# Patient Record
Sex: FEMALE | Race: BLACK OR AFRICAN AMERICAN | NOT HISPANIC OR LATINO | Employment: UNEMPLOYED | URBAN - METROPOLITAN AREA
[De-identification: names, ages, dates, MRNs, and addresses within clinical notes are randomized per-mention and may not be internally consistent; named-entity substitution may affect disease eponyms.]

---

## 2018-04-04 ENCOUNTER — OFFICE VISIT (OUTPATIENT)
Dept: INTERNAL MEDICINE | Facility: CLINIC | Age: 28
End: 2018-04-04
Payer: COMMERCIAL

## 2018-04-04 VITALS
WEIGHT: 159.7 LBS | SYSTOLIC BLOOD PRESSURE: 120 MMHG | BODY MASS INDEX: 26.61 KG/M2 | RESPIRATION RATE: 18 BRPM | TEMPERATURE: 98 F | HEIGHT: 65 IN | DIASTOLIC BLOOD PRESSURE: 74 MMHG | OXYGEN SATURATION: 98 % | HEART RATE: 94 BPM

## 2018-04-04 DIAGNOSIS — N92.6 IRREGULAR MENSTRUAL CYCLE: Primary | ICD-10-CM

## 2018-04-04 LAB
PROLACTIN SERPL-MCNC: 7 UG/L (ref 3–27)
SPECIMEN SOURCE: NORMAL
WET PREP SPEC: NORMAL

## 2018-04-04 PROCEDURE — G0145 SCR C/V CYTO,THINLAYER,RESCR: HCPCS | Performed by: INTERNAL MEDICINE

## 2018-04-04 PROCEDURE — 87210 SMEAR WET MOUNT SALINE/INK: CPT | Performed by: INTERNAL MEDICINE

## 2018-04-04 PROCEDURE — 36415 COLL VENOUS BLD VENIPUNCTURE: CPT | Performed by: INTERNAL MEDICINE

## 2018-04-04 PROCEDURE — 99213 OFFICE O/P EST LOW 20 MIN: CPT | Performed by: INTERNAL MEDICINE

## 2018-04-04 PROCEDURE — 84443 ASSAY THYROID STIM HORMONE: CPT | Performed by: INTERNAL MEDICINE

## 2018-04-04 PROCEDURE — 84146 ASSAY OF PROLACTIN: CPT | Performed by: INTERNAL MEDICINE

## 2018-04-04 NOTE — MR AVS SNAPSHOT
"              After Visit Summary   2018    Elidia Barnes    MRN: [id number]           Patient Information     Date Of Birth          1990        Visit Information        Provider Department      2018 9:00 AM Maye Yepez MD; [employer] [facility]        Today's Diagnoses     Irregular menstrual cycle    -  1       Follow-ups after your visit        Who to contact     If you have questions or need follow up information about today's clinic visit or your schedule please contact [facility] directly at [phone].  Normal or non-critical lab and imaging results will be communicated to you by CloudEnginehart, letter or phone within 4 business days after the clinic has received the results. If you do not hear from us within 7 days, please contact the clinic through CloudEnginehart or phone. If you have a critical or abnormal lab result, we will notify you by phone as soon as possible.  Submit refill requests through Infectious or call your pharmacy and they will forward the refill request to us. Please allow 3 business days for your refill to be completed.          Additional Information About Your Visit        MyChart Information     Infectious lets you send messages to your doctor, view your test results, renew your prescriptions, schedule appointments and more. To sign up, go to [email or web address] . Click on \"Log in\" on the left side of the screen, which will take you to the Welcome page. Then click on \"Sign up Now\" on the right side of the page.     You will be asked to enter the access code listed below, as well as some personal information. Please follow the directions to create your username and password.     Your access code is: [email or web address]  Expires: 7/3/2018  9:48 AM     Your access code will  in 90 days. If you need help or a new code, please call your [facility] or [phone].        Care EveryWhere ID     This is your Care EveryWhere ID. " "This could be used by other organizations to access your [facility] medical records  [id number]        Your Vitals Were     Pulse Temperature Respirations Height Last Period Pulse Oximetry    94 98  F (36.7  C) (Oral) 18 5' 4.5\" (1.638 m) 02/15/2018 (Exact Date) 98%    BMI (Body Mass Index)                   26.99 kg/m2            Blood Pressure from Last 3 Encounters:   04/04/18 120/74    Weight from Last 3 Encounters:   04/04/18 159 lb 11.2 oz (72.4 kg)              We Performed the Following     Prolactin     TSH with free T4 reflex        Primary Care Provider Fax #    Physician No Ref-Primary [phone]       No address on file        Equal Access to Services     [place] : Amy Maston, christina kaur, raul cedeño, morris sheets . So [facility] [phone].    ATENCIÓN: Si habla español, tiene a aguero disposición servicios gratuitos de asistencia lingüística. Llame al [phone].    We comply with applicable federal civil rights laws and [place] laws. We do not discriminate on the basis of race, color, national origin, age, disability, sex, sexual orientation, or gender identity.            Thank you!     Thank you for choosing [facility]  for your care. Our goal is always to provide you with excellent care. Hearing back from our patients is one way we can continue to improve our services. Please take a few minutes to complete the written survey that you may receive in the mail after your visit with us. Thank you!             Your Updated Medication List - Protect others around you: Learn how to safely use, store and throw away your medicines at www.disposemymeds.org.      Notice  As of 4/4/2018  9:48 AM    You have not been prescribed any medications.      "

## 2018-04-04 NOTE — PROGRESS NOTES
"  SUBJECTIVE:   Eldiia Barnes is a 28 year old female who presents to clinic today for the following health issues:      She has irregular period. Her last period started on 2/18/18 and ended on 3/30/18. Sometime she has no menstrual period up to four months and when she gets it then it lasted for over a month.    She has history of miscarriage in 2011, since then her periods of irregular. No other pregnancies since then.     She does report history of galactorrhea during periods.     No weight loss.  No palpitations.  No hair loss.     She also has vaginal itching, no vag discharge.      Problem list and histories reviewed & adjusted, as indicated.  Additional history: as documented    There is no problem list on file for this patient.    History reviewed. No pertinent surgical history.    Social History   Substance Use Topics     Smoking status: Never Smoker     Smokeless tobacco: Never Used     Alcohol use No     History reviewed. No pertinent family history.        Reviewed and updated as needed this visit by clinical staff  Tobacco  Allergies  Meds  Med Hx  Surg Hx  Fam Hx  Soc Hx      Reviewed and updated as needed this visit by Provider         ROS:  Constitutional, HEENT, cardiovascular, pulmonary, gi and gu systems are negative, except as otherwise noted.    OBJECTIVE:     /74 (BP Location: Right arm, Patient Position: Sitting, Cuff Size: Adult Regular)  Pulse 94  Temp 98  F (36.7  C) (Oral)  Resp 18  Ht 5' 4.5\" (1.638 m)  Wt 159 lb 11.2 oz (72.4 kg)  LMP 02/15/2018 (Exact Date)  SpO2 98%  BMI 26.99 kg/m2  Body mass index is 26.99 kg/(m^2).  GENERAL: healthy, alert and no distress  NECK: no adenopathy, no asymmetry, masses, or scars and thyroid normal to palpation  RESP: lungs clear to auscultation - no rales, rhonchi or wheezes  CV: regular rate and rhythm, normal S1 S2, no S3 or S4, no murmur, click or rub, no peripheral edema and peripheral pulses strong  ABDOMEN: soft, " nontender, no hepatosplenomegaly, no masses and bowel sounds normal  MS: no gross musculoskeletal defects noted, no edema  Pelvic Exam:  Vulva: No external lesions, normal hair distribution, no adenopathy  Vagina: Moist, pink, no abnormal discharge, well rugated, no lesions  Cervix: Pap smear is taken, parous, smooth, pink, no visible lesions  Uterus: Normal size, anteverted, non-tender, mobile  Ovaries: No mass, non-tender, mobile        Diagnostic Test Results:  none     ASSESSMENT/PLAN:       (N92.6) Irregular menstrual cycle  (primary encounter diagnosis)  Comment:   She reports history of galactorrhea, will check prolactin levels, along with thyroid.  This seems to be more in line with anovulatory bleeding. If normal, will refer to obgyn for further staley    Plan: TSH with free T4 reflex, Prolactin, Pap imaged         thin layer screen reflex to HPV if ASCUS -         recommend age 25 - 29, Wet prep                Maye Yepez MD  [facility]'

## 2018-04-04 NOTE — NURSING NOTE
"Chief Complaint   Patient presents with     Consult     She has irregular period.       Initial /74 (BP Location: Right arm, Patient Position: Sitting, Cuff Size: Adult Regular)  Pulse 94  Temp 98  F (36.7  C) (Oral)  Resp 18  Ht 5' 4.5\" (1.638 m)  Wt 159 lb 11.2 oz (72.4 kg)  LMP 02/15/2018 (Exact Date)  SpO2 98%  BMI 26.99 kg/m2 Estimated body mass index is 26.99 kg/(m^2) as calculated from the following:    Height as of this encounter: 5' 4.5\" (1.638 m).    Weight as of this encounter: 159 lb 11.2 oz (72.4 kg).  Medication Reconciliation: complete   Sarita Mcclendon MA      "

## 2018-04-04 NOTE — LETTER
April 11, 2018      Elidia Barnes  [address]  [place] [address]    Dear ,      I am happy to inform you that your recent cervical cancer screening test (PAP smear) was normal.      Preventative screenings such as this help to ensure your health for years to come. You should repeat a pap smear in 3 years, unless otherwise directed.      You will still need to return to the clinic every year for your annual exam and other preventive tests.     Please contact the clinic at [phone] if you have further questions.       Sincerely,      Maye Yepez MD/[facility]

## 2018-04-05 LAB — TSH SERPL DL<=0.005 MIU/L-ACNC: 2.3 MU/L (ref 0.4–4)

## 2018-04-06 LAB
COPATH REPORT: NORMAL
PAP: NORMAL

## 2018-04-12 ENCOUNTER — OFFICE VISIT (OUTPATIENT)
Dept: OBGYN | Facility: CLINIC | Age: 28
End: 2018-04-12
Payer: COMMERCIAL

## 2018-04-12 VITALS
HEIGHT: 64 IN | SYSTOLIC BLOOD PRESSURE: 118 MMHG | BODY MASS INDEX: 27.54 KG/M2 | DIASTOLIC BLOOD PRESSURE: 78 MMHG | WEIGHT: 161.3 LBS | TEMPERATURE: 98 F

## 2018-04-12 DIAGNOSIS — N92.6 IRREGULAR MENSTRUAL CYCLE: Primary | ICD-10-CM

## 2018-04-12 DIAGNOSIS — Z11.3 ROUTINE SCREENING FOR STI (SEXUALLY TRANSMITTED INFECTION): ICD-10-CM

## 2018-04-12 PROBLEM — N91.5 OLIGOMENORRHEA, UNSPECIFIED: Status: ACTIVE | Noted: 2018-04-12

## 2018-04-12 LAB
ERYTHROCYTE [DISTWIDTH] IN BLOOD BY AUTOMATED COUNT: 13.7 % (ref 10–15)
FSH SERPL-ACNC: 5.5 IU/L
HCG, QUAL URINE: NORMAL
HCT VFR BLD AUTO: 33.1 % (ref 35–47)
HGB BLD-MCNC: 10.7 G/DL (ref 11.7–15.7)
LH SERPL-ACNC: 11.6 IU/L
MCH RBC QN AUTO: 28 PG (ref 26.5–33)
MCHC RBC AUTO-ENTMCNC: 32.3 G/DL (ref 31.5–36.5)
MCV RBC AUTO: 87 FL (ref 78–100)
PLATELET # BLD AUTO: 314 10E9/L (ref 150–450)
RBC # BLD AUTO: 3.82 10E12/L (ref 3.8–5.2)
WBC # BLD AUTO: 6 10E9/L (ref 4–11)

## 2018-04-12 PROCEDURE — 87591 N.GONORRHOEAE DNA AMP PROB: CPT | Performed by: ADVANCED PRACTICE MIDWIFE

## 2018-04-12 PROCEDURE — 85027 COMPLETE CBC AUTOMATED: CPT | Performed by: ADVANCED PRACTICE MIDWIFE

## 2018-04-12 PROCEDURE — 87491 CHLMYD TRACH DNA AMP PROBE: CPT | Performed by: ADVANCED PRACTICE MIDWIFE

## 2018-04-12 PROCEDURE — 99203 OFFICE O/P NEW LOW 30 MIN: CPT | Performed by: ADVANCED PRACTICE MIDWIFE

## 2018-04-12 PROCEDURE — 36415 COLL VENOUS BLD VENIPUNCTURE: CPT | Performed by: ADVANCED PRACTICE MIDWIFE

## 2018-04-12 PROCEDURE — 83001 ASSAY OF GONADOTROPIN (FSH): CPT | Performed by: ADVANCED PRACTICE MIDWIFE

## 2018-04-12 PROCEDURE — 83002 ASSAY OF GONADOTROPIN (LH): CPT | Performed by: ADVANCED PRACTICE MIDWIFE

## 2018-04-12 NOTE — PROGRESS NOTES
SUBJECTIVE:                                                   Elidia Barnes is a 28 year old who presents to clinic today for the following health issue(s):  Patient presents with:  Abnormal Uterine Bleeding: Irreg menses      HPI: Here to discuss irregular periods.  Has been having irregular periods since , states it occurred after miscarriage. Reports only other symptoms include galactorrhea with menses, frequent headaches and weight gain. Patient and  have been attempting to conceive for 7 years.     Patient's last menstrual period was 2018.  Menstrual History: irregular cycles 4-8 months. Reports heavy to light bleeding, varies on amount of days.   Patient is sexually active  .  Using none for contraception. Trying to conceive   Health maintenance updated:  yes  STI infx testing offered:  Declined recently collected     Problem list and histories reviewed & adjusted, as indicated.  Additional history: as documented.    There is no problem list on file for this patient.    Past Surgical History:   Procedure Laterality Date     NO HISTORY OF SURGERY        Social History   Substance Use Topics     Smoking status: Never Smoker     Smokeless tobacco: Never Used     Alcohol use No      Problem (# of Occurrences) Relation (Name,Age of Onset)    Family History Negative (1) Mother            No current outpatient prescriptions on file.     No current facility-administered medications for this visit.      No Known Allergies    ROS:  C: NEGATIVE for fever, chills, change in weight  E: NEGATIVE for vision changes or irritation  R: NEGATIVE for significant cough or SOB  B: NEGATIVE for masses, tenderness or discharge  CV: NEGATIVE for chest pain, palpitations or peripheral edema  GI: NEGATIVE for nausea, abdominal pain, heartburn, or change in bowel habits  : NEGATIVE for unusual urinary or vaginal symptoms. Periods are regular.  NEURO: NEGATIVE for weakness, dizziness or paresthesias and  "POSITIVE for frequent headaches   P: NEGATIVE for changes in mood or affect    OBJECTIVE:     /78 (BP Location: Left arm, Patient Position: Chair, Cuff Size: Adult Regular)  Temp 98  F (36.7  C) (Oral)  Ht 5' 4\" (1.626 m)  Wt 161 lb 4.8 oz (73.2 kg)  LMP 02/25/2018  BMI 27.69 kg/m2  Body mass index is 27.69 kg/(m^2).    PHYSICAL EXAM:  Constitutional:  Appearance: Well nourished, well developed alert, in no acute distress  Chest:  Respiratory Effort:  Breathing unlabored  Cardiovascular: Heart: Auscultation:  Regular rate, normal rhythm, no murmurs present  Gastrointestinal:  Abdominal Examination:  Abdomen nontender to palpation, tone normal without rigidity or guarding, no masses present, umbilicus without lesions; Liver/Spleen:  No hepatomegaly present, liver nontender to palpation; Hernias:  No hernias present  Neurologic/Psychiatric:  Mental Status:  Oriented X3   Pelvic Exam:  External Genitalia:     Normal appearance for age, no discharge present, no tenderness present, no inflammatory lesions present, color normal  Vagina:     Normal vaginal vault without central or paravaginal defects, no discharge present, no inflammatory lesions present, no masses present  Bladder:     Nontender to palpation  Urethra:   Urethral Body:  Urethra palpation normal, urethra structural support normal   Urethral Meatus:  No erythema or lesions present  Cervix:     Appearance healthy, no lesions present, nontender to palpation, no bleeding present  Uterus:     Uterus: firm, normal sized and nontender, midplane in position.   Adnexa:     No adnexal tenderness present, no adnexal masses present  Perineum:     Perineum within normal limits, no evidence of trauma, no rashes or skin lesions present  Anus:     Anus within normal limits, no hemorrhoids present  Inguinal Lymph Nodes:     No lymphadenopathy present  Pubic Hair:     Normal pubic hair distribution for age  Genitalia and Groin:     No rashes present, no lesions " present, no areas of discoloration, no masses present       In-Clinic Test Results:  No results found for this or any previous visit (from the past 24 hour(s)).    ASSESSMENT/PLAN:                                                      (N92.6) Irregular menstrual cycle  (primary encounter diagnosis)  Comment: ordered  Plan: HCL HCG, URINE, NURSE BACKOFFICE, Follicle         stimulating hormone, Lutropin, CBC with         platelets, US Pelvic Complete w Transvaginal         Results pending     (Z11.3) Routine screening for STI (sexually transmitted infection)  Comment: collected    Plan: Chlamydia trachomatis PCR, Neisseria         gonorrhoeae PCR        Results pending     -Reviewed labs from last visit all NIL, patient not currently having symptoms of galactorrhea.  Will draw FSH and LH however uncertain where she is at in her cycle. Discussed that anovulatory cycles vs oligomenorrhea may have several etiologies   -Will have Patient follow up with OBGYN to discuss further testing for fertility     DARIAN Hendrickson, VANESSA     30 minutes was spent face to face with the patient today discussing her history, diagnosis, and follow-up plan as noted above. Over 50% of the visit was spent in counseling and coordination of care.

## 2018-04-12 NOTE — MR AVS SNAPSHOT
After Visit Summary   4/12/2018    Elidia Barnes    MRN: [id number]           Patient Information     Date Of Birth          1990        Visit Information        Provider Department      4/12/2018 12:45 PM Faith Doan APRN CNM; [employer] [facility]        Today's Diagnoses     Irregular menstrual cycle    -  1    Routine screening for STI (sexually transmitted infection)           Follow-ups after your visit        Follow-up notes from your care team     Return in about 4 weeks (around 5/10/2018), or if symptoms worsen or fail to improve.      Your next 10 appointments already scheduled     Apr 18, 2018  1:45 PM CDT   US PELVIC COMPLETE W TRANSVAGINAL with OXUS1   [facility]    [address] [place] [id number] [phone]           Please bring a list of your medicines (including vitamins, minerals and over-the-counter drugs). Also, tell your doctor about any allergies you may have. Wear comfortable clothes and leave your valuables at home.  Adults: Drink six 8-ounce glasses of fluid one hour before your exam. Do NOT empty your bladder.  If you need to empty your bladder before your exam, try to release only a little bit of urine. Then, drink another 8oz glass of fluid.  Children: Children who are potty trained should drink at least 4 cups (32 oz) of liquid 45 minutes to one hour prior to the exam. The child s bladder must be full in order to achieve a diagnostic exam. If your child is very uncomfortable or has an urgent need to pee, please notify a technologist; they will try to find out how much longer the wait may be and provide instructions to help relieve the pressure. Occasionally it is medically necessary to insert a urinary catheter to fill the bladder.  Please call the Imaging Department at your exam site with any questions.            Apr 25, 2018 12:45 PM  "CDT   SHORT with Mercedez Gustafson, DO   [facility]    [address]  [facility] [address] [phone]              Future tests that were ordered for you today     Open Future Orders        Priority Expected Expires Ordered    US Pelvic Complete w Transvaginal Routine  2019            Who to contact     If you have questions or need follow up information about today's clinic visit or your schedule please contact [facility] directly at [phone].  Normal or non-critical lab and imaging results will be communicated to you by Curb Callhart, letter or phone within 4 business days after the clinic has received the results. If you do not hear from us within 7 days, please contact the clinic through Curb Callhart or phone. If you have a critical or abnormal lab result, we will notify you by phone as soon as possible.  Submit refill requests through [employer] or call your pharmacy and they will forward the refill request to us. Please allow 3 business days for your refill to be completed.          Additional Information About Your Visit        Curb CallharAcrinta Information     [employer] lets you send messages to your doctor, view your test results, renew your prescriptions, schedule appointments and more. To sign up, go to [email or web address] . Click on \"Log in\" on the left side of the screen, which will take you to the Welcome page. Then click on \"Sign up Now\" on the right side of the page.     You will be asked to enter the access code listed below, as well as some personal information. Please follow the directions to create your username and password.     Your access code is: [email or web address]  Expires: 7/3/2018  9:48 AM     Your access code will  in 90 days. If you need help or a new code, please call your [facility] or [phone].        Care EveryWhere ID     This is your Care EveryWhere ID. This could be used by other organizations " "to access your [facility] medical records  [id number]        Your Vitals Were     Temperature Height Last Period BMI (Body Mass Index)          98  F (36.7  C) (Oral) 5' 4\" (1.626 m) 02/25/2018 27.69 kg/m2         Blood Pressure from Last 3 Encounters:   04/12/18 118/78   04/04/18 120/74    Weight from Last 3 Encounters:   04/12/18 161 lb 4.8 oz (73.2 kg)   04/04/18 159 lb 11.2 oz (72.4 kg)              We Performed the Following     CBC with platelets     Chlamydia trachomatis PCR     Follicle stimulating hormone     HCL HCG, URINE, NURSE BACKOFFICE     Lutropin     Neisseria gonorrhoeae PCR        Primary Care Provider Fax #    Physician No Ref-Primary [phone]       No address on file        Equal Access to Services     [place] : Amy hernandez Sotho, waaxda luqadaha, qaybta kaalmada adeegyada, morris sheets . So [facility] [phone].    ATENCIÓN: Si habla español, tiene a aguero disposición servicios gratuitos de asistencia lingüística. Llame al [phone].    We comply with applicable federal civil rights laws and [place] laws. We do not discriminate on the basis of race, color, national origin, age, disability, sex, sexual orientation, or gender identity.            Thank you!     Thank you for choosing [facility]  for your care. Our goal is always to provide you with excellent care. Hearing back from our patients is one way we can continue to improve our services. Please take a few minutes to complete the written survey that you may receive in the mail after your visit with us. Thank you!             Your Updated Medication List - Protect others around you: Learn how to safely use, store and throw away your medicines at www.disposemymeds.org.      Notice  As of 4/12/2018  2:57 PM    You have not been prescribed any medications.      "

## 2018-04-13 LAB
C TRACH DNA SPEC QL NAA+PROBE: NEGATIVE
N GONORRHOEA DNA SPEC QL NAA+PROBE: NEGATIVE
SPECIMEN SOURCE: NORMAL
SPECIMEN SOURCE: NORMAL

## 2018-04-18 ENCOUNTER — RADIANT APPOINTMENT (OUTPATIENT)
Dept: ULTRASOUND IMAGING | Facility: CLINIC | Age: 28
End: 2018-04-18
Attending: ADVANCED PRACTICE MIDWIFE
Payer: COMMERCIAL

## 2018-04-18 DIAGNOSIS — N92.6 IRREGULAR MENSTRUAL CYCLE: ICD-10-CM

## 2018-04-18 PROCEDURE — 76856 US EXAM PELVIC COMPLETE: CPT | Performed by: OBSTETRICS & GYNECOLOGY

## 2018-04-18 PROCEDURE — 76830 TRANSVAGINAL US NON-OB: CPT | Performed by: OBSTETRICS & GYNECOLOGY

## 2018-04-26 ENCOUNTER — OFFICE VISIT (OUTPATIENT)
Dept: OBGYN | Facility: CLINIC | Age: 28
End: 2018-04-26
Payer: COMMERCIAL

## 2018-04-26 ENCOUNTER — TELEPHONE (OUTPATIENT)
Dept: OBGYN | Facility: CLINIC | Age: 28
End: 2018-04-26

## 2018-04-26 VITALS
HEART RATE: 76 BPM | DIASTOLIC BLOOD PRESSURE: 64 MMHG | BODY MASS INDEX: 27.15 KG/M2 | WEIGHT: 158.2 LBS | SYSTOLIC BLOOD PRESSURE: 110 MMHG

## 2018-04-26 DIAGNOSIS — D50.8 OTHER IRON DEFICIENCY ANEMIA: ICD-10-CM

## 2018-04-26 DIAGNOSIS — N92.6 IRREGULAR MENSTRUAL CYCLE: ICD-10-CM

## 2018-04-26 DIAGNOSIS — E28.2 PCOS (POLYCYSTIC OVARIAN SYNDROME): Primary | ICD-10-CM

## 2018-04-26 PROCEDURE — 84270 ASSAY OF SEX HORMONE GLOBUL: CPT | Performed by: FAMILY MEDICINE

## 2018-04-26 PROCEDURE — 99000 SPECIMEN HANDLING OFFICE-LAB: CPT | Performed by: FAMILY MEDICINE

## 2018-04-26 PROCEDURE — 84403 ASSAY OF TOTAL TESTOSTERONE: CPT | Performed by: FAMILY MEDICINE

## 2018-04-26 PROCEDURE — 82157 ASSAY OF ANDROSTENEDIONE: CPT | Mod: 90 | Performed by: FAMILY MEDICINE

## 2018-04-26 PROCEDURE — 82627 DEHYDROEPIANDROSTERONE: CPT | Performed by: FAMILY MEDICINE

## 2018-04-26 PROCEDURE — 83520 IMMUNOASSAY QUANT NOS NONAB: CPT | Mod: 90 | Performed by: FAMILY MEDICINE

## 2018-04-26 PROCEDURE — 36415 COLL VENOUS BLD VENIPUNCTURE: CPT | Performed by: FAMILY MEDICINE

## 2018-04-26 PROCEDURE — 99214 OFFICE O/P EST MOD 30 MIN: CPT | Performed by: FAMILY MEDICINE

## 2018-04-26 RX ORDER — FERROUS SULFATE 325(65) MG
325 TABLET ORAL
Qty: 30 TABLET | Refills: 2 | Status: SHIPPED | OUTPATIENT
Start: 2018-04-26 | End: 2019-03-05

## 2018-04-26 RX ORDER — CLOMIPHENE CITRATE 50 MG/1
50 TABLET ORAL DAILY
Qty: 5 TABLET | Refills: 3 | Status: SHIPPED | OUTPATIENT
Start: 2018-04-26 | End: 2018-07-30

## 2018-04-26 NOTE — PROGRESS NOTES
SUBJECTIVE:  Elidia Barnes is an 28 year old  woman who presents for   Gyn follow-up exam. She was seen by Faith Doan CNM on 18, for irregular menstrual cycle and STI screening.   Last time her treatment plan was labs, STI screening, pelvic US. Pelvic US on  was suggestive of PCOS, but otherwise normal and with normal uterine lining.     Patient complains of irregular menses. Her and her  have been trying for pregnancy for 7 years. Notes she has had irregular periods since her she had her baby in . Adds she sometimes does not have a period for 3-4 months, and sometimes has only 7 days between periods. Notes her LMP began  and lasted until 3/30.  Hx of vaginal delivery in  and miscarriage around 6 months. No other pregnancies.  Denies weight gain or hirsutism.    Desires referral to infertility specialist.     Past Medical History:   Diagnosis Date     NO ACTIVE PROBLEMS           Family History   Problem Relation Age of Onset     Family History Negative Mother        Past Surgical History:   Procedure Laterality Date     NO HISTORY OF SURGERY         Current Outpatient Prescriptions   Medication     clomiPHENE (CLOMID) 50 MG tablet     ferrous sulfate (IRON) 325 (65 Fe) MG tablet     No current facility-administered medications for this visit.      No Known Allergies    Social History   Substance Use Topics     Smoking status: Never Smoker     Smokeless tobacco: Never Used     Alcohol use No     Review Of Systems  Ears/Nose/Throat: negative  Respiratory: No shortness of breath, dyspnea on exertion, cough, or hemoptysis  Cardiovascular: negative  Gastrointestinal: negative  Genitourinary: see HPI   Constitutional, HEENT, cardiovascular, pulmonary, GI, , musculoskeletal, neuro, skin, endocrine and psych systems are negative, except as otherwise noted.    This document serves as a record of the services and decisions personally performed and made by Mercedez Gustafson DO. It  was created on his/her behalf by Laurence Unger, a trained medical scribe. The creation of this document is based the provider's statements to the medical scribe.  Gideon Unger 2:19 PM, 2018    OBJECTIVE:  /64  Pulse 76  Wt 71.8 kg (158 lb 3.2 oz)  LMP 2018 (Approximate)  Breastfeeding? No  BMI 27.15 kg/m2  General appearance: healthy, alert and no distress  Lungs: negative, Percussion normal. Good diaphragmatic excursion. Lungs clear  Heart: negative, PMI normal. No lifts, heaves, or thrills. RRR. No murmurs, clicks gallops or rub      ASSESSMENT:  Elidia Barnes is an 28 year old  woman who presents for   Gyn follow-up exam. She was seen on by Faith Doan CNM on 18, for irregular menstrual cycle and STI screening.  Today the following concerns were addressed:    PLAN:  Dx:   1) PCOS, irregular menstrual cycle: Discussed clomid.  Rx Clomid - Patient instructed on use.  Referral to infertility given, she is considering her options and will start with clomid   2) Return to clinic prn    The information in this document, created by the medical scribe for me, accurately reflects the services I personally performed and the decisions made by me. I have reviewed and approved this document for accuracy prior to leaving the patient care area.  Mercedez Gustafson DO  2:28 PM, 18    Dr. Mercedez Gustafson DO    OB/GYN   [facility]

## 2018-04-26 NOTE — NURSING NOTE
"Chief Complaint   Patient presents with     RECHECK     Discuss results of lab and ultrasound : irregular menses       Initial /64  Pulse 76  Wt 158 lb 3.2 oz (71.8 kg)  LMP 2018 (Approximate)  Breastfeeding? No  BMI 27.15 kg/m2 Estimated body mass index is 27.15 kg/(m^2) as calculated from the following:    Height as of 18: 5' 4\" (1.626 m).    Weight as of this encounter: 158 lb 3.2 oz (71.8 kg).  BP completed using cuff size: regular        The following HM Due: Vaccinations: Tdap    Sherlyn Cassidy CMA               "

## 2018-04-26 NOTE — MR AVS SNAPSHOT
After Visit Summary   4/26/2018    Elidia Barnes    MRN: [id number]           Patient Information     Date Of Birth          1990        Visit Information        Provider Department      4/26/2018 1:45 PM Jolly Gustafson DO; [employer] [facility]        Today's Diagnoses     PCOS (polycystic ovarian syndrome)    -  1    Irregular menstrual cycle          Care Instructions    Referral to infertility given:  Call to get appointment     Labs today     Anovulation/Ovulation Induction:  Patient would like to try clomid to induce ovulation.   I counseled her regarding the 90% chance a couple has to achieve a pregnancy in one year.  Since she does not consistently ovulate she would like to do a trial of clomid.  I counseled her about doing a work-up regarding tubal patency which could be done before clomid or after if unsuccessful.  I also discussed checking hormone levels on day 3 of her next menstrual cycle which may be done to determine egg quality.  At this time she would like empiric treatment with clomid.  Rx for clomid 50 mg po daily x 5 days was given to start on day 5 thru day 9 of cycle.  Ovulation is expected from day 10 to 22 and intercourse should be done every other day starting on day 15.  She may also check for ovulation and intercourse could be done when the kit is positive on that day and the following 2 days.      If no success increasing the dose to 100 mg po daily x days as above should be attempted.  She can try clomid empirically for the next 3 to 6 cycles and after that she should have a study to determine tubal patency.  Rx:        Dr. Jolly Gustafson DO    Obstetrics and Gynecology  [facility]                 Follow-ups after your visit        Additional Services     INFERTILITY/AI REFERRAL       Your provider has referred you to: [facility] [place] ([phone]  [phone]   [email or web address]    [facility] [phone]  [address], [place] [address]        Please be aware that coverage of these services is subject to the terms and limitations of your health insurance plan.  Call member services at your health plan with any benefit or coverage questions.      Please bring the following with you to your appointment:    (1) Any X-Rays, CTs or MRIs which have been performed.  Contact the facility where they were done to arrange for  prior to your scheduled appointment.    (2) List of current medications   (3) This referral request   (4) Any documents/labs given to you for this referral                  Who to contact     If you have questions or need follow up information about today's clinic visit or your schedule please contact [facility] directly at [phone].  Normal or non-critical lab and imaging results will be communicated to you by MyChart, letter or phone within 4 business days after the clinic has received the results. If you do not hear from us within 7 days, please contact the clinic through MyChart or phone. If you have a critical or abnormal lab result, we will notify you by phone as soon as possible.  Submit refill requests through [employer] or call your pharmacy and they will forward the refill request to us. Please allow 3 business days for your refill to be completed.          Additional Information About Your Visit        MyChart Information     [employer] lets you send messages to your doctor, view your test results, renew your prescriptions, schedule appointments and more. To sign up, go to [email or web address] . Click on \"Log in\" on the left side of the screen, which will take you to the Welcome page. Then click on \"Sign up Now\" on the right side of the page.     You will be asked to enter the access code listed below, as well as some personal information. Please follow the directions to " create your username and password.     Your access code is: [email or web address]  Expires: 7/3/2018  9:48 AM     Your access code will  in 90 days. If you need help or a new code, please call your [facility] clinic or [phone].        Care EveryWhere ID     This is your Care EveryWhere ID. This could be used by other organizations to access your [facility] medical records  [id number]        Your Vitals Were     Pulse Last Period Breastfeeding? BMI (Body Mass Index)          76 2018 (Approximate) No 27.15 kg/m2         Blood Pressure from Last 3 Encounters:   18 110/64   18 118/78   18 120/74    Weight from Last 3 Encounters:   18 158 lb 3.2 oz (71.8 kg)   18 161 lb 4.8 oz (73.2 kg)   18 159 lb 11.2 oz (72.4 kg)              We Performed the Following     Androstenedione     Anti-Mullerian hormone     DHEA sulfate     INFERTILITY/AI REFERRAL     Testosterone Free and Total        Primary Care Provider Fax #    Physician No Ref-Primary [phone]       No address on file        Equal Access to Services     [place] AH: Hadii nadeem Matson, waaxda lunerissaadaha, qaybta kaalmada adecelestinayada, morris sheets . So [facility] [phone].    ATENCIÓN: Si habla español, tiene a aguero disposición servicios gratuitos de asistencia lingüística. Llame al [phone].    We comply with applicable federal civil rights laws and [place] laws. We do not discriminate on the basis of race, color, national origin, age, disability, sex, sexual orientation, or gender identity.            Thank you!     Thank you for choosing [facility]  for your care. Our goal is always to provide you with excellent care. Hearing back from our patients is one way we can continue to improve our services. Please take a few minutes to complete the written survey that you may receive in the mail after your visit with us. Thank you!             Your Updated Medication List -  Protect others around you: Learn how to safely use, store and throw away your medicines at www.disposemymeds.org.      Notice  As of 4/26/2018  2:22 PM    You have not been prescribed any medications.

## 2018-04-26 NOTE — PATIENT INSTRUCTIONS
Referral to infertility given:  Call to get appointment     Labs today     Anovulation/Ovulation Induction:  Patient would like to try clomid to induce ovulation.   I counseled her regarding the 90% chance a couple has to achieve a pregnancy in one year.  Since she does not consistently ovulate she would like to do a trial of clomid.  I counseled her about doing a work-up regarding tubal patency which could be done before clomid or after if unsuccessful.  I also discussed checking hormone levels on day 3 of her next menstrual cycle which may be done to determine egg quality.  At this time she would like empiric treatment with clomid.  Rx for clomid 50 mg po daily x 5 days was given to start on day 5 thru day 9 of cycle.  Ovulation is expected from day 10 to 22 and intercourse should be done every other day starting on day 15.  She may also check for ovulation and intercourse could be done when the kit is positive on that day and the following 2 days.      If no success increasing the dose to 100 mg po daily x days as above should be attempted.  She can try clomid empirically for the next 3 to 6 cycles and after that she should have a study to determine tubal patency.  Rx:        Dr. Mercedez Gustafson, DO    Obstetrics and Gynecology  [facility] [place] [facility] [place]

## 2018-04-27 LAB
DHEA-S SERPL-MCNC: 139 UG/DL (ref 35–430)
MIS SERPL-MCNC: 6.47 NG/ML (ref 0.4–16.02)

## 2018-04-28 LAB — ANDROST SERPL-MCNC: 1.06 NG/ML (ref 0.26–2.14)

## 2018-04-30 LAB
SHBG SERPL-SCNC: 27 NMOL/L (ref 30–135)
TESTOST FREE SERPL-MCNC: 0.46 NG/DL (ref 0.08–0.74)
TESTOST SERPL-MCNC: 23 NG/DL (ref 8–60)

## 2018-05-22 ENCOUNTER — TELEPHONE (OUTPATIENT)
Dept: OBGYN | Facility: CLINIC | Age: 28
End: 2018-05-22

## 2018-05-22 DIAGNOSIS — N97.0 ANOVULATION: Primary | ICD-10-CM

## 2018-05-22 NOTE — TELEPHONE ENCOUNTER
Patient stopped into clinic. Informs me that she finished her medication (clomid) and got her period 5/21. Next steps were to try a higher dose and patient would like to try that. Please call patient when RX is ready to pickup or with additional questions, [phone]

## 2018-05-24 RX ORDER — CLOMIPHENE CITRATE 50 MG/1
50 TABLET ORAL DAILY
Qty: 10 TABLET | Refills: 1 | Status: SHIPPED | OUTPATIENT
Start: 2018-05-24 | End: 2018-05-25

## 2018-05-25 RX ORDER — CLOMIPHENE CITRATE 50 MG/1
100 TABLET ORAL DAILY
Qty: 10 TABLET | Refills: 1 | Status: SHIPPED | OUTPATIENT
Start: 2018-05-25 | End: 2018-07-30

## 2018-07-30 ENCOUNTER — PRENATAL OFFICE VISIT (OUTPATIENT)
Dept: NURSING | Facility: CLINIC | Age: 28
End: 2018-07-30
Payer: COMMERCIAL

## 2018-07-30 VITALS
SYSTOLIC BLOOD PRESSURE: 112 MMHG | DIASTOLIC BLOOD PRESSURE: 70 MMHG | BODY MASS INDEX: 26.43 KG/M2 | WEIGHT: 154 LBS | HEART RATE: 80 BPM

## 2018-07-30 DIAGNOSIS — Z34.81 ENCOUNTER FOR SUPERVISION OF OTHER NORMAL PREGNANCY IN FIRST TRIMESTER: Primary | ICD-10-CM

## 2018-07-30 DIAGNOSIS — R82.71 GBS BACTERIURIA: ICD-10-CM

## 2018-07-30 LAB
ERYTHROCYTE [DISTWIDTH] IN BLOOD BY AUTOMATED COUNT: 14.6 % (ref 10–15)
HCG, QUAL URINE: POSITIVE
HCT VFR BLD AUTO: 35.9 % (ref 35–47)
HGB BLD-MCNC: 12 G/DL (ref 11.7–15.7)
MCH RBC QN AUTO: 29.4 PG (ref 26.5–33)
MCHC RBC AUTO-ENTMCNC: 33.4 G/DL (ref 31.5–36.5)
MCV RBC AUTO: 88 FL (ref 78–100)
PLATELET # BLD AUTO: 266 10E9/L (ref 150–450)
RBC # BLD AUTO: 4.08 10E12/L (ref 3.8–5.2)
WBC # BLD AUTO: 6.9 10E9/L (ref 4–11)

## 2018-07-30 PROCEDURE — 86850 RBC ANTIBODY SCREEN: CPT | Performed by: FAMILY MEDICINE

## 2018-07-30 PROCEDURE — 99207 ZZC NO CHARGE NURSE ONLY: CPT

## 2018-07-30 PROCEDURE — 84703 CHORIONIC GONADOTROPIN ASSAY: CPT

## 2018-07-30 PROCEDURE — 86780 TREPONEMA PALLIDUM: CPT | Performed by: FAMILY MEDICINE

## 2018-07-30 PROCEDURE — 87389 HIV-1 AG W/HIV-1&-2 AB AG IA: CPT | Performed by: FAMILY MEDICINE

## 2018-07-30 PROCEDURE — 86900 BLOOD TYPING SEROLOGIC ABO: CPT | Performed by: FAMILY MEDICINE

## 2018-07-30 PROCEDURE — 87077 CULTURE AEROBIC IDENTIFY: CPT | Performed by: FAMILY MEDICINE

## 2018-07-30 PROCEDURE — 87086 URINE CULTURE/COLONY COUNT: CPT | Performed by: FAMILY MEDICINE

## 2018-07-30 PROCEDURE — 86901 BLOOD TYPING SEROLOGIC RH(D): CPT | Performed by: FAMILY MEDICINE

## 2018-07-30 PROCEDURE — 36415 COLL VENOUS BLD VENIPUNCTURE: CPT | Performed by: FAMILY MEDICINE

## 2018-07-30 PROCEDURE — 87340 HEPATITIS B SURFACE AG IA: CPT | Performed by: FAMILY MEDICINE

## 2018-07-30 PROCEDURE — 85027 COMPLETE CBC AUTOMATED: CPT | Performed by: FAMILY MEDICINE

## 2018-07-30 PROCEDURE — 86762 RUBELLA ANTIBODY: CPT | Performed by: FAMILY MEDICINE

## 2018-07-30 RX ORDER — PRENATAL VIT/IRON FUM/FOLIC AC 27MG-0.8MG
1 TABLET ORAL DAILY
COMMUNITY
End: 2023-07-07

## 2018-07-30 NOTE — MR AVS SNAPSHOT
After Visit Summary   7/30/2018    Elidia Barnes    MRN: [id number]           Patient Information     Date Of Birth          1990        Visit Information        Provider Department      7/30/2018 9:30 AM [employer]; [facility] PRENATAL NURSE [facility]        Today's Diagnoses     Encounter for supervision of other normal pregnancy in first trimester    -  1       Follow-ups after your visit        Your next 10 appointments already scheduled     Aug 01, 2018  8:00 AM CDT   [facility]ound with RIUS[facility]   [facility]    [address]  [address]  [facility] [address]   [phone]            Aug 28, 2018  2:00 PM CDT   New Prenatal with Jolly Gustafson DO   [facility]    [address] [facility] [address] [phone]            Sep 25, 2018  9:30 AM CDT   ESTABLISHED PRENATAL with Jolly Gustafson DO   [facility]    [address] [place] [address] [phone]              Future tests that were ordered for you today     Open Future Orders        Priority Expected Expires Ordered    US OB < 14 weeks, single,  for dating (RQX126) Routine  10/28/2018 7/30/2018            Who to contact     If you have questions or need follow up information about today's clinic visit or your schedule please contact [facility] directly at [phone].  Normal or non-critical lab and imaging results will be communicated to you by MyChart, letter or phone within 4 business days after the clinic has received the results. If you do not hear from us within 7 days, please contact the clinic through MyChart or phone. If you have a critical or abnormal lab result, we will notify you by phone as soon as possible.  Submit refill requests through [employer] or call your pharmacy and they will  "forward the refill request to us. Please allow 3 business days for your refill to be completed.          Additional Information About Your Visit        MyChart Information     [employer] lets you send messages to your doctor, view your test results, renew your prescriptions, schedule appointments and more. To sign up, go to [email or web address] . Click on \"Log in\" on the left side of the screen, which will take you to the Welcome page. Then click on \"Sign up Now\" on the right side of the page.     You will be asked to enter the access code listed below, as well as some personal information. Please follow the directions to create your username and password.     Your access code is: [email or web address]  Expires: 10/28/2018 10:35 AM     Your access code will  in 90 days. If you need help or a new code, please call your [facility] clinic or [phone].        Care EveryWhere ID     This is your Care EveryWhere ID. This could be used by other organizations to access your [facility] medical records  [id number]        Your Vitals Were     Pulse Last Period Breastfeeding? BMI (Body Mass Index)          80 2018 (Exact Date) No 26.43 kg/m2         Blood Pressure from Last 3 Encounters:   18 112/70   18 110/64   18 118/78    Weight from Last 3 Encounters:   18 154 lb (69.9 kg)   18 158 lb 3.2 oz (71.8 kg)   18 161 lb 4.8 oz (73.2 kg)              We Performed the Following     ABO/Rh type and screen     CBC with platelets     HCL HCG, URINE, NURSE BACKOFFICE     Hepatitis B surface antigen     HIV Antigen Antibody Combo     Rubella Antibody IgG Quantitative     Treponema Abs w Reflex to RPR and Titer     Urine Culture Aerobic Bacterial        Primary Care Provider Fax #    Physician No Ref-Primary [phone]       No address on file        Equal Access to Services     [place] AH: Amy Matson, christina kaur, shakiraybradha kaalcarolin cedeño, morris pereira " maddi sheets ah. So [facility] [phone].    ATENCIÓN: Si habla nate, tiene a aguero disposición servicios gratuitos de asistencia lingüística. Rina al [phone].    We comply with applicable federal civil rights laws and [place] laws. We do not discriminate on the basis of race, color, national origin, age, disability, sex, sexual orientation, or gender identity.            Thank you!     Thank you for choosing [facility]  for your care. Our goal is always to provide you with excellent care. Hearing back from our patients is one way we can continue to improve our services. Please take a few minutes to complete the written survey that you may receive in the mail after your visit with us. Thank you!             Your Updated Medication List - Protect others around you: Learn how to safely use, store and throw away your medicines at www.disposemymeds.org.          This list is accurate as of 7/30/18 10:35 AM.  Always use your most recent med list.                   Brand Name Dispense Instructions for use Diagnosis    ferrous sulfate 325 (65 Fe) MG tablet    IRON    30 tablet    Take 1 tablet (325 mg) by mouth daily (with breakfast)    Other iron deficiency anemia       prenatal multivitamin plus iron 27-0.8 MG Tabs per tablet      Take 1 tablet by mouth daily

## 2018-07-30 NOTE — PROGRESS NOTES
Blood pressure 112/70, pulse 80, weight 154 lb (69.9 kg), last menstrual period 05/21/2018, not currently breastfeeding.    10w0d  Estimated Date of Delivery: Feb 25, 2019      Patient is accompanied by  and . Prenatal book and folder (containing standard educational hand-outs and brochures) given to patient. Information in folder reviewed. Questions answered.     Discussed and gave written information on options available for 1st and 2nd trimester screening, CVS, amniocentesis, AFP, and QUAD screen plus optimal time-frame for testing. Brochure given on optional screening available to determine Cystic Fibrosis carrier status. Pt instructed to check with insurance regarding coverage of these optional tests. Pt advised to call back if she desires testing or has any questions or concerns.    Prenatal labs obtained. New prenatal visit scheduled on 8/28 with Dr. Gustafson.      Claire Cadet RN

## 2018-07-31 PROBLEM — N91.5 OLIGOMENORRHEA, UNSPECIFIED: Status: RESOLVED | Noted: 2018-04-12 | Resolved: 2018-07-31

## 2018-07-31 LAB
BACTERIA SPEC CULT: ABNORMAL
HBV SURFACE AG SERPL QL IA: NONREACTIVE
HIV 1+2 AB+HIV1 P24 AG SERPL QL IA: NONREACTIVE
RUBV IGG SERPL IA-ACNC: 139 IU/ML
SPECIMEN SOURCE: ABNORMAL
T PALLIDUM AB SER QL: NONREACTIVE

## 2018-08-01 DIAGNOSIS — Z34.81 ENCOUNTER FOR SUPERVISION OF OTHER NORMAL PREGNANCY IN FIRST TRIMESTER: ICD-10-CM

## 2018-08-01 LAB
ABO + RH BLD: ABNORMAL
ABO + RH BLD: ABNORMAL
BLD GP AB INVEST PLASRBC-IMP: ABNORMAL
BLD GP AB SCN SERPL QL: ABNORMAL
BLD GP AB SCN TITR SERPL: NORMAL {TITER}
BLOOD BANK CMNT PATIENT-IMP: ABNORMAL

## 2018-08-06 RX ORDER — PENICILLIN V POTASSIUM 250 MG/1
250 TABLET, FILM COATED ORAL 3 TIMES DAILY
Qty: 21 TABLET | Refills: 0 | Status: SHIPPED | OUTPATIENT
Start: 2018-08-06 | End: 2018-08-13

## 2018-08-13 LAB
ABO + RH BLD: ABNORMAL
ABO + RH BLD: ABNORMAL
BLD GP AB INVEST PLASRBC-IMP: ABNORMAL
BLD GP AB SCN SERPL QL: ABNORMAL
BLOOD BANK CMNT PATIENT-IMP: ABNORMAL
BLOOD BANK CMNT PATIENT-IMP: ABNORMAL
SPECIMEN EXP DATE BLD: ABNORMAL

## 2018-08-23 ENCOUNTER — HOSPITAL ENCOUNTER (EMERGENCY)
Facility: CLINIC | Age: 28
Discharge: HOME | End: 2018-08-23
Attending: EMERGENCY MEDICINE | Admitting: EMERGENCY MEDICINE
Payer: COMMERCIAL

## 2018-08-23 ENCOUNTER — APPOINTMENT (OUTPATIENT)
Dept: ULTRASOUND IMAGING | Facility: CLINIC | Age: 28
End: 2018-08-23
Attending: EMERGENCY MEDICINE
Payer: COMMERCIAL

## 2018-08-23 VITALS
WEIGHT: 154 LBS | SYSTOLIC BLOOD PRESSURE: 102 MMHG | HEIGHT: 64 IN | RESPIRATION RATE: 16 BRPM | HEART RATE: 82 BPM | BODY MASS INDEX: 26.29 KG/M2 | OXYGEN SATURATION: 99 % | DIASTOLIC BLOOD PRESSURE: 81 MMHG | TEMPERATURE: 98.5 F

## 2018-08-23 DIAGNOSIS — O20.9 VAGINAL BLEEDING IN PREGNANCY, FIRST TRIMESTER: ICD-10-CM

## 2018-08-23 LAB
B-HCG FREE SERPL-ACNC: >2000 IU/L
BASOPHILS # BLD AUTO: 0 10E9/L (ref 0–0.2)
BASOPHILS NFR BLD AUTO: 0.3 %
DIFFERENTIAL METHOD BLD: ABNORMAL
EOSINOPHIL # BLD AUTO: 0 10E9/L (ref 0–0.7)
EOSINOPHIL NFR BLD AUTO: 0.5 %
ERYTHROCYTE [DISTWIDTH] IN BLOOD BY AUTOMATED COUNT: 13.9 % (ref 10–15)
HCT VFR BLD AUTO: 33.8 % (ref 35–47)
HGB BLD-MCNC: 11 G/DL (ref 11.7–15.7)
IMM GRANULOCYTES # BLD: 0 10E9/L (ref 0–0.4)
IMM GRANULOCYTES NFR BLD: 0.4 %
LYMPHOCYTES # BLD AUTO: 2.1 10E9/L (ref 0.8–5.3)
LYMPHOCYTES NFR BLD AUTO: 27.2 %
MCH RBC QN AUTO: 29.4 PG (ref 26.5–33)
MCHC RBC AUTO-ENTMCNC: 32.5 G/DL (ref 31.5–36.5)
MCV RBC AUTO: 90 FL (ref 78–100)
MONOCYTES # BLD AUTO: 0.8 10E9/L (ref 0–1.3)
MONOCYTES NFR BLD AUTO: 10.7 %
NEUTROPHILS # BLD AUTO: 4.6 10E9/L (ref 1.6–8.3)
NEUTROPHILS NFR BLD AUTO: 60.9 %
NRBC # BLD AUTO: 0 10*3/UL
NRBC BLD AUTO-RTO: 0 /100
PLATELET # BLD AUTO: 252 10E9/L (ref 150–450)
RBC # BLD AUTO: 3.74 10E12/L (ref 3.8–5.2)
WBC # BLD AUTO: 7.6 10E9/L (ref 4–11)

## 2018-08-23 PROCEDURE — 76801 OB US < 14 WKS SINGLE FETUS: CPT

## 2018-08-23 PROCEDURE — 99284 EMERGENCY DEPT VISIT MOD MDM: CPT

## 2018-08-23 PROCEDURE — 85025 COMPLETE CBC W/AUTO DIFF WBC: CPT | Performed by: EMERGENCY MEDICINE

## 2018-08-23 PROCEDURE — 84702 CHORIONIC GONADOTROPIN TEST: CPT

## 2018-08-23 ASSESSMENT — ENCOUNTER SYMPTOMS
NAUSEA: 0
BACK PAIN: 1
HEMATURIA: 0
ABDOMINAL PAIN: 1
DYSURIA: 0

## 2018-08-23 NOTE — ED AVS SNAPSHOT
[facility] Emergency Department    [address]    [facility] [address]    Phone:  [phone]    Fax:  [phone]                                       Elidia Barnes   MRN: [id number]    Department:  [facility] Emergency Department   Date of Visit:  8/23/2018           After Visit Summary Signature Page     I have received my discharge instructions, and my questions have been answered. I have discussed any challenges I see with this plan with the nurse or doctor.    ..........................................................................................................................................  Patient/Patient Representative Signature      ..........................................................................................................................................  Patient Representative Print Name and Relationship to Patient    ..................................................               ................................................  Date                                            Time    ..........................................................................................................................................  Reviewed by Signature/Title    ...................................................              ..............................................  Date                                                            Time          22EPIC Rev 08/18

## 2018-08-23 NOTE — ED PROVIDER NOTES
"  History     Chief Complaint:  Vaginal Bleeding     HPI   Elidia Barnes is a 28 year old female, with a history of HTN, who presents with her  to the emergency department for evaluation of vaginal bleeding. The patient reports she experienced some vaginal bleeding at 1200 this afternoon along with some abdominal pain and back pain. She denies any nausea, dysuria or hematuria. She reports she is currently about 12 weeks pregnant and has a history of a 6 month miscarriage. She notes she does not have other children at home to run around after and her job is a desk job.    Allergies:  Blood transfusion     Medications:    Ferrous sulfate  Prenatal vitamins    Past Medical History:    Pregnancy induced hypertension    Past Surgical History:    The patient does not have any pertinent past surgical history.    Family History:    No past pertinent family history.    Social History:  Negative for tobacco use.  Negative for alcohol use.  Marital Status:        Review of Systems   Gastrointestinal: Positive for abdominal pain. Negative for nausea.   Genitourinary: Positive for vaginal bleeding. Negative for dysuria and hematuria.   Musculoskeletal: Positive for back pain.     Physical Exam   Patient Vitals for the past 24 hrs:   BP Temp Temp src Pulse Heart Rate Resp SpO2 Height Weight   08/23/18 1600 102/81 98.5  F (36.9  C) Oral 82 82 16 99 % 1.626 m (5' 4\") 69.9 kg (154 lb)     Physical Exam   Constitutional: She is cooperative.   Eyes: Conjunctivae are normal.   Cardiovascular: Regular rhythm and normal heart sounds.    Pulmonary/Chest: Effort normal and breath sounds normal.   Abdominal: Normal appearance and bowel sounds are normal. There is no tenderness.   Neurological: She is alert.   Skin: Skin is warm and dry.         Emergency Department Course   Imaging:  Radiographic findings were communicated with the patient who voiced understanding of the findings.    OB US 1st trimester w " transvag:  IMPRESSION:   1. Single live intrauterine pregnancy, 11 weeks 5 days +/- 1 week  gestational age.  2. No evidence of complications.   As per radiology.     Laboratory:  CBC: HGB 11.0 (L) o/w WNL (WBC: 7.6,PLT: 252)  ISTAT HCG Pregnancy POCT: >2000 (H)    Emergency Department Course:  Nursing notes and vitals reviewed.  I performed an exam of the patient as documented above.     Blood drawn. This was sent to the lab for further testing, results above.    The patient was sent for a OB 1st trimester ultrasound while in the emergency department, findings above.     1931 I rechecked the patient and discussed the results of her workup thus far.     Findings and plan explained to the Patient and spouse. Patient discharged home with instructions regarding supportive care, medications, and reasons to return. The importance of close follow-up was reviewed.    I personally reviewed the laboratory results with the Patient and spouse and answered all related questions prior to discharge.   Orders Placed This Encounter   Procedures     US OB < 14 Weeks Single     CBC + differential     ISTAT HCG Quantitative Pregnancy Nursing POCT     ISTAT HCG Quantitative Pregnancy POCT     Findings and plan explained to the Patient and spouse. Patient discharged home with instructions regarding supportive care, medications, and reasons to return. The importance of close follow-up was reviewed.   Impression & Plan    Medical Decision Making:    Elidia Barnes is a 28 year old female who presents to the emergency department with spotting in early pregnancy.  As noted she is blood type a positive.  Ultrasound shows viable intrauterine pregnancy without subchorionic hemorrhage or other abnormalities.  Hemoglobin is adequate.  She is hemodynamically stable.  I will have the patient follow-up with her OB/GYN, vaginal bleeding instructions, return if bleeding much more heavy, passing tissue or other problems.    Critical Care time:   none    Diagnosis:    ICD-10-CM   1. Vaginal bleeding in pregnancy, first trimester O20.9     Disposition:  Discharged to home with her .  Thiago Mack  8/23/2018   [facility] EMERGENCY DEPARTMENT  Scribe Disclosure:  I, Erinerica Mack, am serving as a scribe at 7:31 PM on 8/23/2018 to document services personally performed by Nia Mackay MD based on my observations and the provider's statements to me.      Nia Mackay MD  08/23/18 0739

## 2018-08-23 NOTE — ED TRIAGE NOTES
Patient states she is 13 weeks 4 days pregnant. She began having cramps in her low abdomen and low back this morning and at noon she noticed vaginal spotting, but only when she wipes. History of previous miscarriage at 6 months.

## 2018-08-23 NOTE — ED AVS SNAPSHOT
[facility] Emergency Department    [address] [place] [address]    Phone:  [phone]    Fax:  [phone]                                       Elidia Barnes   MRN: [id number]    Department:  [facility] Emergency Department   Date of Visit:  8/23/2018           Patient Information     Date Of Birth          1990        Your diagnoses for this visit were:     Vaginal bleeding in pregnancy, first trimester        You were seen by Sukhdeep Paredes MD and Nia Mackay MD.        Discharge Instructions       Discharge Instructions  Vaginal Bleeding in Pregnancy    Bleeding in early pregnancy can be a sign of a miscarriage in process or an abnormal pregnancy, but often is innocent and the pregnancy will continue normally. We may do blood pregnancy tests and ultrasound to try to determine what is causing the bleeding in your case, but sometimes we can't tell and need to follow you with time, more blood tests, and another ultrasound.     Return to the Emergency Department if:    You have severe abdominal or pelvic pain.    You faint, or feel lightheaded or dizzy.     Your bleeding is gets much worse, and is heavier than a heavy period or if you pass any blood clots larger than a quarter.    You pass any tissue--solid material that doesn't appear smooth and even like a blood clot. If you pass tissue, save it (even if you have to pull it out of the toilet) and put it in a plastic bag or jar and bring it in.      You have a fever of 100.5 degrees or higher.  If a pregnancy was seen in your uterus:    If a heartbeat could be seen, the chance of miscarriage is much lower.    You need to see your regular doctor, or an OB/GYN doctor, within 2-3 days.    You should not have sex or put anything in your vagina.     Facts about miscarriage: We hope you don't have a miscarriage, but if you do, here are important things to know:    Early miscarriage is very  common, and having one miscarriage doesn't mean you will have problems with another pregnancy.    Nothing you did caused it. Taking medicine, drinking alcohol, having sex, exercising, or falling down won't cause a miscarriage.     If you were given a prescription for medicine here today, be sure to read all of the information (including the package insert) that comes with your prescription.  This will include important information about the medicine, its side effects, and any warnings that you need to know about.  The pharmacist who fills the prescription can provide more information and answer questions you may have about the medicine.  If you have questions or concerns that the pharmacist cannot address, please call or return to the Emergency Department.       Remember that you can always come back to the Emergency Department if you are not able to see your regular doctor in the amount of time listed above, if you get any new symptoms, or if there is anything that worries you.        Your next 10 appointments already scheduled     Sep 17, 2018  9:30 AM CDT   New Prenatal with Concha Prather, DO   [facility] ([facility])    [address] [place] [address] [phone]              24 Hour Appointment Hotline       To make an appointment at any [facility], call [phone] ([phone]). If you don't have a family doctor or clinic, we will help you find one. [facility] are conveniently located to serve the needs of you and your family.             Review of your medicines      Our records show that you are taking the medicines listed below. If these are incorrect, please call your family doctor or clinic.        Dose / Directions Last dose taken    ferrous sulfate 325 (65 Fe) MG tablet   Commonly known as:  IRON   Dose:  325 mg   Quantity:  30 tablet        Take 1 tablet (325 mg) by mouth daily (with breakfast)   Refills:  2         prenatal multivitamin plus iron 27-0.8 MG Tabs per tablet   Dose:  1 tablet        Take 1 tablet by mouth daily   Refills:  0                Procedures and tests performed during your visit     CBC + differential    ISTAT HCG Quantitative Pregnancy Nursing POCT    ISTAT HCG Quantitative Pregnancy POCT    US OB < 14 Weeks Single      Orders Needing Specimen Collection     None      Pending Results     No orders found from 8/21/2018 to 8/24/2018.            Pending Culture Results     No orders found from 8/21/2018 to 8/24/2018.            Pending Results Instructions     If you had any lab results that were not finalized at the time of your Discharge, you can call the ED Lab Result RN at [phone]. You will be contacted by this team for any positive Lab results or changes in treatment. The nurses are available 7 days a week from 10A to 6:30P.  You can leave a message 24 hours per day and they will return your call.        Test Results From Your Hospital Stay              8/23/2018  6:08 PM      Component Results     Component Value Ref Range & Units Status    WBC 7.6 4.0 - 11.0 10e9/L Final    RBC Count 3.74 (L) 3.8 - 5.2 10e12/L Final    Hemoglobin 11.0 (L) 11.7 - 15.7 g/dL Final    Hematocrit 33.8 (L) 35.0 - 47.0 % Final    MCV 90 78 - 100 fl Final    MCH 29.4 26.5 - 33.0 pg Final    MCHC 32.5 31.5 - 36.5 g/dL Final    RDW 13.9 10.0 - 15.0 % Final    Platelet Count 252 150 - 450 10e9/L Final    Diff Method Automated Method  Final    % Neutrophils 60.9 % Final    % Lymphocytes 27.2 % Final    % Monocytes 10.7 % Final    % Eosinophils 0.5 % Final    % Basophils 0.3 % Final    % Immature Granulocytes 0.4 % Final    Nucleated RBCs 0 0 /100 Final    Absolute Neutrophil 4.6 1.6 - 8.3 10e9/L Final    Absolute Lymphocytes 2.1 0.8 - 5.3 10e9/L Final    Absolute Monocytes 0.8 0.0 - 1.3 10e9/L Final    Absolute Eosinophils 0.0 0.0 - 0.7 10e9/L Final    Absolute Basophils 0.0 0.0 - 0.2 10e9/L Final    Abs Immature  Granulocytes 0.0 0 - 0.4 10e9/L Final    Absolute Nucleated RBC 0.0  Final         8/23/2018  6:06 PM      Component Results     Component Value Ref Range & Units Status    HCG Quantitative Serum >2000.0 (H) <5.0 IU/L Final         8/23/2018  7:20 PM      Narrative     OBSTETRIC ULTRASOUND   8/23/2018 7:11 PM     HISTORY: Bleeding in early pregnancy.    COMPARISON: None.    FINDINGS: There is a single, live intrauterine pregnancy.  Estimated gestational age by current ultrasound measurement: 11 weeks  5 days.  Estimated date of delivery based on this ultrasound: 3/9/2019.  Crown-rump length: 4.9 cm.   Cardiac activity: 153 bpm.   Yolk sac: Normal.  Subchorionic hemorrhage: None.    Right ovary: Unremarkable.  Left ovary: Unremarkable.  Adnexal mass: None.  Free pelvic fluid: None.        Impression     IMPRESSION:   1. Single live intrauterine pregnancy, 11 weeks 5 days +/- 1 week  gestational age.  2. No evidence of complications.     JASON SCOTT MD                Clinical Quality Measure: Blood Pressure Screening     Your blood pressure was checked while you were in the emergency department today. The last reading we obtained was  BP: 102/81 . Please read the guidelines below about what these numbers mean and what you should do about them.  If your systolic blood pressure (the top number) is less than 120 and your diastolic blood pressure (the bottom number) is less than 80, then your blood pressure is normal. There is nothing more that you need to do about it.  If your systolic blood pressure (the top number) is 120-139 or your diastolic blood pressure (the bottom number) is 80-89, your blood pressure may be higher than it should be. You should have your blood pressure rechecked within a year by a primary care provider.  If your systolic blood pressure (the top number) is 140 or greater or your diastolic blood pressure (the bottom number) is 90 or greater, you may have high blood pressure. High blood pressure  "is treatable, but if left untreated over time it can put you at risk for heart attack, stroke, or kidney failure. You should have your blood pressure rechecked by a primary care provider within the next 4 weeks.  If your provider in the emergency department today gave you specific instructions to follow-up with your doctor or provider even sooner than that, you should follow that instruction and not wait for up to 4 weeks for your follow-up visit.        Thank you for choosing [facility]       Thank you for choosing [facility] for your care. Our goal is always to provide you with excellent care. Hearing back from our patients is one way we can continue to improve our services. Please take a few minutes to complete the written survey that you may receive in the mail after you visit with us. Thank you!        PredictAdhardVisit Information     nPicker lets you send messages to your doctor, view your test results, renew your prescriptions, schedule appointments and more. To sign up, go to [email or web address] . Click on \"Log in\" on the left side of the screen, which will take you to the Welcome page. Then click on \"Sign up Now\" on the right side of the page.     You will be asked to enter the access code listed below, as well as some personal information. Please follow the directions to create your username and password.     Your access code is: [email or web address]  Expires: 10/28/2018 10:35 AM     Your access code will  in 90 days. If you need help or a new code, please call your [facility] clinic or [phone].        Care EveryWhere ID     This is your Care EveryWhere ID. This could be used by other organizations to access your [facility] medical records  [id number]        Equal Access to Services     [facility] PRANAV : Amy Matson, christina kaur, morris jimenez. So [facility] [phone].    ATENCIÓN: Si habla español, tiene a aguero disposición servicios gratuitos de " asistencia lingüística. Rina al [phone].    We comply with applicable federal civil rights laws and [place] laws. We do not discriminate on the basis of race, color, national origin, age, disability, sex, sexual orientation, or gender identity.            After Visit Summary       This is your record. Keep this with you and show to your community pharmacist(s) and doctor(s) at your next visit.

## 2018-08-24 NOTE — DISCHARGE INSTRUCTIONS
Discharge Instructions  Vaginal Bleeding in Pregnancy    Bleeding in early pregnancy can be a sign of a miscarriage in process or an abnormal pregnancy, but often is innocent and the pregnancy will continue normally. We may do blood pregnancy tests and ultrasound to try to determine what is causing the bleeding in your case, but sometimes we can't tell and need to follow you with time, more blood tests, and another ultrasound.     Return to the Emergency Department if:    You have severe abdominal or pelvic pain.    You faint, or feel lightheaded or dizzy.     Your bleeding is gets much worse, and is heavier than a heavy period or if you pass any blood clots larger than a quarter.    You pass any tissue--solid material that doesn't appear smooth and even like a blood clot. If you pass tissue, save it (even if you have to pull it out of the toilet) and put it in a plastic bag or jar and bring it in.      You have a fever of 100.5 degrees or higher.  If a pregnancy was seen in your uterus:    If a heartbeat could be seen, the chance of miscarriage is much lower.    You need to see your regular doctor, or an OB/GYN doctor, within 2-3 days.    You should not have sex or put anything in your vagina.     Facts about miscarriage: We hope you don't have a miscarriage, but if you do, here are important things to know:    Early miscarriage is very common, and having one miscarriage doesn't mean you will have problems with another pregnancy.    Nothing you did caused it. Taking medicine, drinking alcohol, having sex, exercising, or falling down won't cause a miscarriage.     If you were given a prescription for medicine here today, be sure to read all of the information (including the package insert) that comes with your prescription.  This will include important information about the medicine, its side effects, and any warnings that you need to know about.  The pharmacist who fills the prescription can provide more  information and answer questions you may have about the medicine.  If you have questions or concerns that the pharmacist cannot address, please call or return to the Emergency Department.       Remember that you can always come back to the Emergency Department if you are not able to see your regular doctor in the amount of time listed above, if you get any new symptoms, or if there is anything that worries you.

## 2018-09-19 ENCOUNTER — PRENATAL OFFICE VISIT (OUTPATIENT)
Dept: OBGYN | Facility: CLINIC | Age: 28
End: 2018-09-19
Payer: COMMERCIAL

## 2018-09-19 ENCOUNTER — TELEPHONE (OUTPATIENT)
Dept: OBGYN | Facility: CLINIC | Age: 28
End: 2018-09-19

## 2018-09-19 VITALS — SYSTOLIC BLOOD PRESSURE: 90 MMHG | WEIGHT: 154 LBS | DIASTOLIC BLOOD PRESSURE: 60 MMHG | BODY MASS INDEX: 26.43 KG/M2

## 2018-09-19 DIAGNOSIS — R82.71 GBS BACTERIURIA: ICD-10-CM

## 2018-09-19 DIAGNOSIS — O09.299 PRIOR PREGNANCY WITH FETAL DEMISE: ICD-10-CM

## 2018-09-19 DIAGNOSIS — R76.8 RED BLOOD CELL ANTIBODY POSITIVE: ICD-10-CM

## 2018-09-19 DIAGNOSIS — O09.292 H/O PRE-ECLAMPSIA IN PRIOR PREGNANCY, CURRENTLY PREGNANT, SECOND TRIMESTER: ICD-10-CM

## 2018-09-19 DIAGNOSIS — O09.92 SUPERVISION OF HIGH RISK PREGNANCY IN SECOND TRIMESTER: Primary | ICD-10-CM

## 2018-09-19 LAB — BLD GP AB SCN SERPL QL: NORMAL

## 2018-09-19 PROCEDURE — 36415 COLL VENOUS BLD VENIPUNCTURE: CPT | Performed by: OBSTETRICS & GYNECOLOGY

## 2018-09-19 PROCEDURE — 86850 RBC ANTIBODY SCREEN: CPT | Performed by: OBSTETRICS & GYNECOLOGY

## 2018-09-19 PROCEDURE — 99207 ZZC FIRST OB VISIT: CPT | Performed by: OBSTETRICS & GYNECOLOGY

## 2018-09-19 PROCEDURE — 87491 CHLMYD TRACH DNA AMP PROBE: CPT | Performed by: OBSTETRICS & GYNECOLOGY

## 2018-09-19 PROCEDURE — 87088 URINE BACTERIA CULTURE: CPT | Performed by: OBSTETRICS & GYNECOLOGY

## 2018-09-19 PROCEDURE — 87086 URINE CULTURE/COLONY COUNT: CPT | Performed by: OBSTETRICS & GYNECOLOGY

## 2018-09-19 PROCEDURE — 87591 N.GONORRHOEAE DNA AMP PROB: CPT | Performed by: OBSTETRICS & GYNECOLOGY

## 2018-09-19 NOTE — NURSING NOTE
"Chief Complaint   Patient presents with     Prenatal Care       Initial BP 90/60  Wt 154 lb (69.9 kg)  LMP 2018 (Exact Date)  BMI 26.43 kg/m2 Estimated body mass index is 26.43 kg/(m^2) as calculated from the following:    Height as of 18: 5' 4\" (1.626 m).    Weight as of this encounter: 154 lb (69.9 kg).  BP completed using cuff size: regular        The following HM Due: NONE      +flutters  -swelling      Sylvia Talavera, CMA           "

## 2018-09-19 NOTE — TELEPHONE ENCOUNTER
[facility] lab calling. They are wondering about the lab orders placed today. Do you want the full type and screen or just the screen of the RBCs? They will send for titer if screen is abnormal. They said it looks like the previous screen was titered and was unable to be identified. Please advise which labs you would like.      Lab: [phone]      Claire Cadet RN

## 2018-09-19 NOTE — PROGRESS NOTES
Initial Obstetrics Visit    Subjective: 28 year old  at 15w4d dated by early ultrasound here today for initial OB visit. Dating changed today based on ultrasound. Patient reports No Problems. Denies cramping and vaginal spotting.      present for this visit    OB History:   Previous pregnancy complicated by a history of preeclampsia in her first pregnancy, .  Patient states she was 28 weeks and was diagnosed with elevated blood pressure.  She went from [facility] to [facility] to see a doctor.  She was put on blood pressure medications.  2 weeks later she was told her baby had passed.  Patient had a vaginal delivery.  She denies any other complications.  This is her second pregnancy.  Declines elevated blood pressure between her last delivery and now.  She is not currently on antihypertensives.    Gyn History:   Menses: LMP: Patient's last menstrual period was 2018 (exact date). Irregular  Sexually transmitted disease history: none.  Immunizations: Has not had flu vaccination this year  H/o Chicken Pox or Varicella Vaccination: Yes    Past Medical History:   Diagnosis Date     NO ACTIVE PROBLEMS      PIH (pregnancy induced hypertension)      Past Surgical History:   Procedure Laterality Date     NO HISTORY OF SURGERY       family history includes Family History Negative in her mother.  Social History     Social History     Marital status:      Spouse name: Raleigh Hernandez     Number of children: N/A     Years of education: N/A     Occupational History     Not on file.     Social History Main Topics     Smoking status: Never Smoker     Smokeless tobacco: Never Used     Alcohol use No     Drug use: No     Sexual activity: Yes     Partners: Male     Birth control/ protection: None     Other Topics Concern     Not on file     Social History Narrative     Blood transfusion related (informational only)    Current Outpatient Prescriptions on File Prior to Visit:  Prenatal Vit-Fe Fumarate-FA  (PRENATAL MULTIVITAMIN PLUS IRON) 27-0.8 MG TABS per tablet Take 1 tablet by mouth daily   ferrous sulfate (IRON) 325 (65 Fe) MG tablet Take 1 tablet (325 mg) by mouth daily (with breakfast) (Patient not taking: Reported on 2018)     No current facility-administered medications on file prior to visit.      Review Of Systems: Negative except as mentioned in HPI    Exam:  Vitals: BP 90/60  Wt 154 lb (69.9 kg)  LMP 2018 (Exact Date)  BMI 26.43 kg/m2  BMI= Body mass index is 26.43 kg/(m^2).    Constitutional: Healthy appearing female. No acute distress.  HEENT: Normal appearance. Neck supple, thyroid normal in size without nodularity or masses.  Cardiovascular: Regular rate and rhythm without murmurs, clicks, gallops or rub.  Respiratory: Clear to auscultation bilaterally without crackles or wheeze.  Breast Exam: No visible masses or suspicious skin changes.  No discrete or dominant masses to palpation.  No axillary lymphadenopathy.  Gastrointestinal: Abdomen soft, non-tender. BS normal. No masses or organomegaly.  Pelvic Exam -    Vulva: Normal genitalia   Vagina: Normal mucosa, no abnormal discharge   Cervix: Parous, closed, mobile, no discharge, GC/Chlamydia obtained, pap not due   Uterus: enlarged, consistent with 15 weeks   Adnexa: No masses, nodularity, tenderness  Skin: No suspicious lesions or rashes  Psychiatric: Mentation appears normal and affect normal      Lab Results   Component Value Date    HGB 11.0 2018    HGB 12.0 2018       Recent Labs   Lab Test  18   1029   ABO  A  A   RH  Pos  Pos   AS  Pos*  Pos*     Rhogam not indicated     Recent Labs   Lab Test  18   1030   HEPBANG  Nonreactive   HIAGAB  Nonreactive   RUQIGG  139         Assessment: 28 year old  at 15w4d here today for initial prenatal visit. Doing well overall.   1. Supervision of high risk pregnancy in second trimester  A+ / RI , + antibody screen - not identified    2. H/O pre-eclampsia in  prior pregnancy, currently pregnant, second trimester  - [facility] REFERRAL-PREGNANCY  - Start baby ASA daily - printed instructions, printed symptoms of preeclampsia     3. Prior pregnancy with fetal demise  - @ 28 weeks, diagnosed with gestational hypertension vs. preeclampsia in [place]  - [facility] REFERRAL-PREGNANCY    4. GBS bacteriuria  - Treated   - Urine Culture Aerobic Bacterial    5. Red blood cell antibody positive  - Antibody screen red cell  - Antibody titer red cell    Additional Plan:  - Labs: Prenatal labs reviewed. GC/Chlam collected, PAP negative 2018 - not due.    - Prenatal testing: Discussed options for screening for and diagnosis of chromosomal anomalies, including first trimester screen, noninvasive prenatal testing/cell-free fetal DNA testing, CVS/amniocentesis, quad screen, and ultrasound or comprehensive Level II US at 18-20 weeks. She is electing ultrasound at 18-20 weeks with [facility].      - Other/Follow-up: Follow up in 4 weeks.        Concha Prather, DO  Obstetrics and Gynecology

## 2018-09-19 NOTE — MR AVS SNAPSHOT
After Visit Summary   9/19/2018    Elidia Barnes    MRN: [id number]           Patient Information     Date Of Birth          1990        Visit Information        Provider Department      9/19/2018 9:45 AM Concha Prather DO; [facility] [employer] [facility]        Today's Diagnoses     Supervision of high-risk pregnancy of elderly multigravida    -  1    H/O pre-eclampsia in prior pregnancy, currently pregnant, second trimester        GBS bacteriuria          Care Instructions    Start Baby Aspirin (Aspirin 81 mg daily), start now. Stop at 36 weeks.    Preeclampsia    What is it?  Preeclampsia is a disease in pregnancy, or the 6 weeks after delivery, related to high blood pressure and protein in your urine.      When to call your doctor if....    You have a severe headache that does not get better with Tylenol      You have blurred/double vision or see spots      Your swelling worsens - especially your face, hands and feet      You have right upper abdominal pain (just below your ribs)      You have increased shortness of breath                    Follow-ups after your visit        Follow-up notes from your care team     Return in about 4 weeks (around 10/17/2018).      Who to contact     If you have questions or need follow up information about today's clinic visit or your schedule please contact [facility] directly at [phone].  Normal or non-critical lab and imaging results will be communicated to you by MyChart, letter or phone within 4 business days after the clinic has received the results. If you do not hear from us within 7 days, please contact the clinic through MyChart or phone. If you have a critical or abnormal lab result, we will notify you by phone as soon as possible.  Submit refill requests through [employer] or call your pharmacy and they will forward the refill request to us. Please allow 3 business days for your  "refill to be completed.          Additional Information About Your Visit        CrescentratingharEndgame Information     [employer] lets you send messages to your doctor, view your test results, renew your prescriptions, schedule appointments and more. To sign up, go to [email or web address] . Click on \"Log in\" on the left side of the screen, which will take you to the Welcome page. Then click on \"Sign up Now\" on the right side of the page.     You will be asked to enter the access code listed below, as well as some personal information. Please follow the directions to create your username and password.     Your access code is: [email or web address]  Expires: 10/28/2018 10:35 AM     Your access code will  in 90 days. If you need help or a new code, please call your [facility] clinic or [phone].        Care EveryWhere ID     This is your Care EveryWhere ID. This could be used by other organizations to access your [facility] medical records  [id number]        Your Vitals Were     Last Period BMI (Body Mass Index)                2018 (Exact Date) 26.43 kg/m2           Blood Pressure from Last 3 Encounters:   18 90/60   18 102/81   18 112/70    Weight from Last 3 Encounters:   18 154 lb (69.9 kg)   18 154 lb (69.9 kg)   18 154 lb (69.9 kg)              Today, you had the following     No orders found for display       Primary Care Provider Office Phone # Fax #    [facility] [phone]       [address] [place] [address]        Equal Access to Services     [facility]: Hadii nadeem Matson, waseanda luclaire, qaybta kaalmorris ordaz. So [facility] [phone].    ATENCIÓN: Si habla español, tiene a aguero disposición servicios gratuitos de asistencia lingüística. Rina cabezas [phone].    We comply with applicable federal civil rights laws and [place] laws. We do not discriminate on the basis of race, " color, national origin, age, disability, sex, sexual orientation, or gender identity.            Thank you!     Thank you for choosing [facility]  for your care. Our goal is always to provide you with excellent care. Hearing back from our patients is one way we can continue to improve our services. Please take a few minutes to complete the written survey that you may receive in the mail after your visit with us. Thank you!             Your Updated Medication List - Protect others around you: Learn how to safely use, store and throw away your medicines at www.disposemymeds.org.          This list is accurate as of 9/19/18 10:38 AM.  Always use your most recent med list.                   Brand Name Dispense Instructions for use Diagnosis    ferrous sulfate 325 (65 Fe) MG tablet    IRON    30 tablet    Take 1 tablet (325 mg) by mouth daily (with breakfast)    Other iron deficiency anemia       prenatal multivitamin plus iron 27-0.8 MG Tabs per tablet      Take 1 tablet by mouth daily

## 2018-09-20 ENCOUNTER — TELEPHONE (OUTPATIENT)
Dept: OBGYN | Facility: CLINIC | Age: 28
End: 2018-09-20

## 2018-09-20 LAB
BLD GP AB SCN TITR SERPL: NORMAL {TITER}
C TRACH DNA SPEC QL NAA+PROBE: NEGATIVE
N GONORRHOEA DNA SPEC QL NAA+PROBE: NEGATIVE
SPECIMEN SOURCE: NORMAL
SPECIMEN SOURCE: NORMAL

## 2018-09-20 NOTE — TELEPHONE ENCOUNTER
Lab calls and said they have cancelled the antibody titer as the antibody screen was negative.      Theresa BOBO R.N.  [facility]

## 2018-09-21 DIAGNOSIS — R82.71 GBS BACTERIURIA: Primary | ICD-10-CM

## 2018-09-21 LAB
BACTERIA SPEC CULT: ABNORMAL
BACTERIA SPEC CULT: ABNORMAL
SPECIMEN SOURCE: ABNORMAL

## 2018-09-21 RX ORDER — CEPHALEXIN 500 MG/1
500 CAPSULE ORAL 2 TIMES DAILY
Qty: 14 CAPSULE | Refills: 0 | Status: ON HOLD | OUTPATIENT
Start: 2018-09-21 | End: 2019-02-26

## 2018-09-24 DIAGNOSIS — O09.299 PRIOR PREGNANCY WITH FETAL DEMISE: Primary | ICD-10-CM

## 2018-10-03 ENCOUNTER — PRE VISIT (OUTPATIENT)
Dept: MATERNAL FETAL MEDICINE | Facility: CLINIC | Age: 28
End: 2018-10-03

## 2018-10-09 ENCOUNTER — OFFICE VISIT (OUTPATIENT)
Dept: MATERNAL FETAL MEDICINE | Facility: CLINIC | Age: 28
End: 2018-10-09
Attending: OBSTETRICS & GYNECOLOGY
Payer: COMMERCIAL

## 2018-10-09 ENCOUNTER — HOSPITAL ENCOUNTER (OUTPATIENT)
Dept: ULTRASOUND IMAGING | Facility: CLINIC | Age: 28
Discharge: HOME | End: 2018-10-09
Attending: OBSTETRICS & GYNECOLOGY | Admitting: OBSTETRICS & GYNECOLOGY
Payer: COMMERCIAL

## 2018-10-09 DIAGNOSIS — O09.299 HX OF PRE-ECLAMPSIA IN PRIOR PREGNANCY, CURRENTLY PREGNANT: ICD-10-CM

## 2018-10-09 DIAGNOSIS — O35.9XX0 SUSPECTED FETAL ANOMALY, ANTEPARTUM, SINGLE OR UNSPECIFIED FETUS: ICD-10-CM

## 2018-10-09 DIAGNOSIS — O09.299 PRIOR PREGNANCY WITH FETAL DEMISE: Primary | ICD-10-CM

## 2018-10-09 DIAGNOSIS — O26.90 PREGNANCY RELATED CONDITION, ANTEPARTUM: ICD-10-CM

## 2018-10-09 PROCEDURE — 76811 OB US DETAILED SNGL FETUS: CPT

## 2018-10-09 NOTE — PROGRESS NOTES
Dear Dr. Prather,  Thank you for your request for a maternal fetal medicine consultation regarding Elidia Barnes whose pregnancy is complicated by prior history of preeclampsia and fetal demise.  HPI: Elidia Barnes is a 28 year old  at 18+3 weels with a history of preeclampsia and fetal demise at 30 weeks in her last pregnancy.  Her pregnancy was complicated by elevated blood pressure starting at 28 weeks, for which she was started on medication in [facility].  At her next visit 2 weeks later she was diagnosed with fetal demise.  She reports her blood pressures in the hospital were in the 200's and she had a significant HA for the week prior.  Following delivery she remained on blood pressure medications for a week, which were then discontinued and reports her BPs have been normal since that time.  She is unsure whether baby measured small and an autopsy/genetic testing was not performed.  BP was 90/60 on initial prenatal exam.  She started a daily ASA early in pregnancy.      ObHx: 2011: 1 x fetal demise @ 30 weeks   GynHx: Denies abnormal PAPs or STDs  MedHx: Denies    SurgHx: Denies  SocHx: Denies tobacco, alcohol or illicit drug use.  Meds:  PNV, ASA  Allergies:  Denies    Impression:  At today s visit we discussed with Elidia Barnes the implications of a prior history of preeclampsia with severe features accompanied by fetal demise.  We discussed that given her history of demise related to preeclampsia we would recommend testing for antiphospholipid antibody syndrome, which can be associated both with recurrent miscarriages, fetal demise and severe preeclampsia.  If testing for APAS is positive, we would recommend adding prophylactic Lovenox in additional to her daily ASA, with confirmatory testing repeated after pregnancy.       We discussed the recurrence rate of preeclampsia in subsequent pregnancies is 25% however this risk varies with the severity and time of onset of preeclampsia in prior pregnancies and may  be as high as 65% in women with a history of early, severe preeclampsia. The risks associated with preeclampsia include but are not limited to: increased maternal and fetal morbidity and mortality, placental abruption, stroke, seizures, and FGR.   We discussed the need for frequent blood pressure monitoring (every 2 weeks) and baseline labs, including a urine protein to creatinine ratio if this has not already been done.  Given her prior fetal demise surveillance with growth ultrasounds every 4 weeks with BPPs starting at 28 weeks is recommended.    Recommendations:  1) Antiphospholipid antibody syndrome testing.  If positive, being treatment with prophylactic Lovenox, in additional to her daily ASA, with confirmatory testing after pregnancy.    2) Frequent monitoring of blood pressure (every 2 weeks) and baseline preeclampsia labs in pregnancy, including a urine protein to creatinine ratio if this has not been done.  Continue daily ASA to reduce risk of preeclampsia.  3) Growth ultrasound every 4 weeks with weekly BPPs starting at 28 weeks given history of fetal demise.    A copy of this consultation will be sent to your office.  Thank you for the opportunity to participate in the care of this patient.  If you have questions regarding today s evaluation or if we can be of further service, please contact the [facility].  The patient was seen for an outpatient consultation.  The majority of time (>50%) was spent on counseling and coordination of care of this patient and/or family members.  Total face-to-face time was 30 minutes.    Magnolia Samaniego,   Maternal Fetal Medicine

## 2018-10-09 NOTE — MR AVS SNAPSHOT
After Visit Summary   10/9/2018    Elidia Barnes    MRN: [id number]           Patient Information     Date Of Birth          1990        Visit Information        Provider Department      10/9/2018 10:15 AM Magnolia Samaniego, DO [facility] Maternal Fetal Medicine [facility]        Today's Diagnoses     Prior pregnancy with fetal demise    -  1    Pregnancy related condition, antepartum        Suspected fetal anomaly, antepartum, single or unspecified fetus        Hx of pre-eclampsia in prior pregnancy, currently pregnant           Follow-ups after your visit        Your next 10 appointments already scheduled     Oct 30, 2018  9:30 AM CDT   MFM US COMPRE SINGLE F/U with MFMUSR2   [facility] Maternal Fetal Medicine Ultrasound - [facility] ([facility])    [address]  [facility] [address] [phone]           Wear comfortable clothes and leave your valuables at home.            Oct 30, 2018 10:00 AM CDT   [facility]   [facility] Maternal Fetal Medicine [facility] ([facility])    [address]  [facility] [address] [phone]           Please arrive at the time given for your first appointment. This visit is used internally to schedule the physician's time during your ultrasound.              Future tests that were ordered for you today     Open Future Orders        Priority Expected Expires Ordered    MFM US Comprehensive Single F/U Routine 10/30/2018 10/9/2019 10/9/2018            Who to contact     If you have questions or need follow up information about today's clinic visit or your schedule please contact [facility] directly at [phone].  Normal or non-critical lab and imaging results will be communicated to you by MyChart, letter or phone within 4 business days after the clinic has received the results. If you do not hear from us within 7 days, please contact the clinic through  "[employer] Oy[employer] or phone. If you have a critical or abnormal lab result, we will notify you by phone as soon as possible.  Submit refill requests through PlayDo or call your pharmacy and they will forward the refill request to us. Please allow 3 business days for your refill to be completed.          Additional Information About Your Visit        [employer] Information     PlayDo lets you send messages to your doctor, view your test results, renew your prescriptions, schedule appointments and more. To sign up, go to [email or web address] . Click on \"Log in\" on the left side of the screen, which will take you to the Welcome page. Then click on \"Sign up Now\" on the right side of the page.     You will be asked to enter the access code listed below, as well as some personal information. Please follow the directions to create your username and password.     Your access code is: [email or web address]  Expires: 10/28/2018 10:35 AM     Your access code will  in 90 days. If you need help or a new code, please call your [place] clinic or [phone].        Care EveryWhere ID     This is your Care EveryWhere ID. This could be used by other organizations to access your [place] medical records  [id number]        Your Vitals Were     Last Period                   2018 (Exact Date)            Blood Pressure from Last 3 Encounters:   18 90/60   18 102/81   18 112/70    Weight from Last 3 Encounters:   18 69.9 kg (154 lb)   18 69.9 kg (154 lb)   18 69.9 kg (154 lb)              We Performed the Following     MFM Office Visit        Primary Care Provider Office Phone # Fax #    [facility] [phone]       [address] [place] [address]        Equal Access to Services     JOHNNIE ROCK : Amy lunsfordo Sotho, waaxda luqadaha, qaybta kaalmada adeegyada, waxay ethan bustos. So [facility] [phone].    ATENCIÓN: Si habla " español, tiene a aguero disposición servicios gratuitos de asistencia lingüística. Rina cabezas [phone].    We comply with applicable federal civil rights laws and [place] laws. We do not discriminate on the basis of race, color, national origin, age, disability, sex, sexual orientation, or gender identity.            Thank you!     Thank you for choosing [facility]  for your care. Our goal is always to provide you with excellent care. Hearing back from our patients is one way we can continue to improve our services. Please take a few minutes to complete the written survey that you may receive in the mail after your visit with us. Thank you!             Your Updated Medication List - Protect others around you: Learn how to safely use, store and throw away your medicines at www.disposemymeds.org.          This list is accurate as of 10/9/18 10:59 AM.  Always use your most recent med list.                   Brand Name Dispense Instructions for use Diagnosis    ferrous sulfate 325 (65 Fe) MG tablet    IRON    30 tablet    Take 1 tablet (325 mg) by mouth daily (with breakfast)    Other iron deficiency anemia       prenatal multivitamin plus iron 27-0.8 MG Tabs per tablet      Take 1 tablet by mouth daily

## 2018-10-10 PROBLEM — Z87.59 H/O SEVERE PRE-ECLAMPSIA: Status: ACTIVE | Noted: 2018-10-10

## 2018-10-10 PROBLEM — O09.299 PRIOR PREGNANCY WITH FETAL DEMISE: Status: ACTIVE | Noted: 2018-10-10

## 2018-10-19 ENCOUNTER — PRENATAL OFFICE VISIT (OUTPATIENT)
Dept: OBGYN | Facility: CLINIC | Age: 28
End: 2018-10-19
Payer: COMMERCIAL

## 2018-10-19 VITALS — SYSTOLIC BLOOD PRESSURE: 118 MMHG | DIASTOLIC BLOOD PRESSURE: 70 MMHG | WEIGHT: 160 LBS | BODY MASS INDEX: 27.46 KG/M2

## 2018-10-19 DIAGNOSIS — O09.299 PRIOR PREGNANCY WITH FETAL DEMISE: ICD-10-CM

## 2018-10-19 DIAGNOSIS — O09.299 HX OF PRE-ECLAMPSIA IN PRIOR PREGNANCY, CURRENTLY PREGNANT: Primary | ICD-10-CM

## 2018-10-19 DIAGNOSIS — Z23 NEED FOR PROPHYLACTIC VACCINATION AND INOCULATION AGAINST INFLUENZA: ICD-10-CM

## 2018-10-19 LAB
CREAT UR-MCNC: 163 MG/DL
ERYTHROCYTE [DISTWIDTH] IN BLOOD BY AUTOMATED COUNT: 13.9 % (ref 10–15)
HCT VFR BLD AUTO: 33.6 % (ref 35–47)
HGB BLD-MCNC: 11.1 G/DL (ref 11.7–15.7)
MCH RBC QN AUTO: 30.2 PG (ref 26.5–33)
MCHC RBC AUTO-ENTMCNC: 33 G/DL (ref 31.5–36.5)
MCV RBC AUTO: 91 FL (ref 78–100)
PLATELET # BLD AUTO: 270 10E9/L (ref 150–450)
PROT UR-MCNC: 0.13 G/L
PROT/CREAT 24H UR: 0.08 G/G CR (ref 0–0.2)
RBC # BLD AUTO: 3.68 10E12/L (ref 3.8–5.2)
WBC # BLD AUTO: 9.6 10E9/L (ref 4–11)

## 2018-10-19 PROCEDURE — 86147 CARDIOLIPIN ANTIBODY EA IG: CPT | Performed by: OBSTETRICS & GYNECOLOGY

## 2018-10-19 PROCEDURE — 90686 IIV4 VACC NO PRSV 0.5 ML IM: CPT | Performed by: OBSTETRICS & GYNECOLOGY

## 2018-10-19 PROCEDURE — 00000401 ZZHCL STATISTIC THROMBIN TIME NC: Performed by: OBSTETRICS & GYNECOLOGY

## 2018-10-19 PROCEDURE — 36415 COLL VENOUS BLD VENIPUNCTURE: CPT | Performed by: OBSTETRICS & GYNECOLOGY

## 2018-10-19 PROCEDURE — 85613 RUSSELL VIPER VENOM DILUTED: CPT | Performed by: OBSTETRICS & GYNECOLOGY

## 2018-10-19 PROCEDURE — 80053 COMPREHEN METABOLIC PANEL: CPT | Performed by: OBSTETRICS & GYNECOLOGY

## 2018-10-19 PROCEDURE — 85027 COMPLETE CBC AUTOMATED: CPT | Performed by: OBSTETRICS & GYNECOLOGY

## 2018-10-19 PROCEDURE — 99207 ZZC COMPLICATED OB VISIT: CPT | Performed by: OBSTETRICS & GYNECOLOGY

## 2018-10-19 PROCEDURE — 90471 IMMUNIZATION ADMIN: CPT | Performed by: OBSTETRICS & GYNECOLOGY

## 2018-10-19 PROCEDURE — 84550 ASSAY OF BLOOD/URIC ACID: CPT | Performed by: OBSTETRICS & GYNECOLOGY

## 2018-10-19 PROCEDURE — 84156 ASSAY OF PROTEIN URINE: CPT | Performed by: OBSTETRICS & GYNECOLOGY

## 2018-10-19 PROCEDURE — 86146 BETA-2 GLYCOPROTEIN ANTIBODY: CPT | Performed by: OBSTETRICS & GYNECOLOGY

## 2018-10-19 PROCEDURE — 85730 THROMBOPLASTIN TIME PARTIAL: CPT | Performed by: OBSTETRICS & GYNECOLOGY

## 2018-10-19 PROCEDURE — 00000167 ZZHCL STATISTIC INR NC: Performed by: OBSTETRICS & GYNECOLOGY

## 2018-10-19 NOTE — NURSING NOTE
"Chief Complaint   Patient presents with     Prenatal Care       Initial /70  Wt 160 lb (72.6 kg)  LMP 2018 (Exact Date)  BMI 27.46 kg/m2 Estimated body mass index is 27.46 kg/(m^2) as calculated from the following:    Height as of 18: 5' 4\" (1.626 m).    Weight as of this encounter: 160 lb (72.6 kg).  BP completed using cuff size: regular    Questioned patient about current smoking habits.  Pt. has never smoked.          The following HM Due: NONE    +fetal movement      Sylvia Talavera, CMA      "

## 2018-10-19 NOTE — PROGRESS NOTES
28 year old  at 19w6d weeks presents to the clinic for a routine prenatal visit.  Feeling well.  No vaginal bleeding, leakage of fluid, or cramping/contractions.     present for this visit.     Requests confirmation of pregnancy letter today for immigration office.    /70  Wt 160 lb (72.6 kg)  LMP 2018 (Exact Date)  BMI 27.46 kg/m2      1. Supervision of high risk pregnancy in second trimester  - A+ / RI , + antibody screen - resolved  - Flu vaccination today  - Normal anatomical scan with MFM  - Tdap @ 27+ weeks    2. H/O pre-eclampsia (severe) in prior pregnancy, currently pregnant, second trimester  - Following with MFM  - ASA 81mg daily  - Signs and symptoms reviewed, discussed when to call  - Baseline labs today:   - CBC with platelets   - Comprehensive metabolic panel   - Protein  random urine with Creat Ratio   - Uric acid  - Growth ultrasound q 4 weeks, BPPs starting at 28 weeks    3. Prior pregnancy with fetal demise (in [place])  - Diagnosed with preeclampsia @ 28 weeks, fetal demise at 30 weeks  - Following with MFM  - Antiphospholipid Syndrome Labs today   - Lupus Anticoagulant Panel   - Beta 2 Glycoprotein Antibodies IGG IGM   - Cardiolipin Hermila IgG and IgM  - If the above positive, plan additional of prophylactic Lovenox with repeat testing post partum  - See above    4. GBS bacteriuria  - Treated   - PCN in labor    Return to clinic every 2 weeks for visit, blood pressure check.     Concha Prather, DO  Obstetrics and Gynecology

## 2018-10-19 NOTE — LETTER
10/19/2018    To Whom It May Concern,           Please be aware that Elidia Barnes is currently pregnant with a due date of 3/9/2019. This letter was requested by Elidia and her .        Please let us know if we can assist in any way.         Sincerely,            Concha Prather, DO  OB/GYN

## 2018-10-19 NOTE — MR AVS SNAPSHOT
After Visit Summary   10/19/2018    Elidia Barnes    MRN: [id number]           Patient Information     Date Of Birth          1990        Visit Information        Provider Department      10/19/2018 11:00 AM Concha Prather DO; [employer] [facility]        Today's Diagnoses     Hx of pre-eclampsia in prior pregnancy, currently pregnant    -  1    Prior pregnancy with fetal demise        Need for prophylactic vaccination and inoculation against influenza           Follow-ups after your visit        Follow-up notes from your care team     Return in about 2 weeks (around 11/2/2018).      Your next 10 appointments already scheduled     Oct 30, 2018  9:30 AM CDT   [facility]INGLE [facility]   [facility] Maternal Fetal Medicine Ultrasound - [facility])    [address]  [facility] [address] [phone]           Wear comfortable clothes and leave your valuables at home.            Oct 30, 2018 10:00 AM CDT   Radiology MD with [facility] MD   [facility] Maternal Fetal Medicine [facility])    [address]  [facility] [address] [phone]           Please arrive at the time given for your first appointment. This visit is used internally to schedule the physician's time during your ultrasound.              Who to contact     If you have questions or need follow up information about today's clinic visit or your schedule please contact [facility] directly at [phone].  Normal or non-critical lab and imaging results will be communicated to you by MyChart, letter or phone within 4 business days after the clinic has received the results. If you do not hear from us within 7 days, please contact the clinic through MyChart or phone. If you have a critical or abnormal lab result, we will notify you by phone as soon as possible.  Submit refill requests  through Lithera or call your pharmacy and they will forward the refill request to us. Please allow 3 business days for your refill to be completed.          Additional Information About Your Visit        Care EveryWhere ID     This is your Care EveryWhere ID. This could be used by other organizations to access your [place] medical records  [id number]        Your Vitals Were     Last Period BMI (Body Mass Index)                05/21/2018 (Exact Date) 27.46 kg/m2           Blood Pressure from Last 3 Encounters:   10/19/18 118/70   09/19/18 90/60   08/23/18 102/81    Weight from Last 3 Encounters:   10/19/18 160 lb (72.6 kg)   09/19/18 154 lb (69.9 kg)   08/23/18 154 lb (69.9 kg)              We Performed the Following     Beta 2 Glycoprotein Antibodies IGG IGM     Cardiolipin Hermila IgG and IgM     CBC with platelets     Comprehensive metabolic panel     Creatinine urine calculation only     FLU VACCINE, SPLIT VIRUS, IM (QUADRIVALENT) [99475]- >3 YRS     Lupus Anticoagulant Panel     Protein  random urine with Creat Ratio     Uric acid     Vaccine Administration, Initial [30941]        Primary Care Provider Office Phone # Fax #    [facility] [phone]       [address] [place] [address]        Equal Access to Services     EL ROCK : Hadii aad ku hadasho Soomaali, waaxda luqadaha, qaybta kaalmada adeegyada, waxay reganin chris busots. So [facility] [phone].    ATENCIÓN: Si habla español, tiene a aguero disposición servicios gratuitos de asistencia lingüística. Llame al [phone].    We comply with applicable federal civil rights laws and [place] laws. We do not discriminate on the basis of race, color, national origin, age, disability, sex, sexual orientation, or gender identity.            Thank you!     Thank you for choosing [facility]  for your care. Our goal is always to provide you with excellent care. Hearing back from our patients  is one way we can continue to improve our services. Please take a few minutes to complete the written survey that you may receive in the mail after your visit with us. Thank you!             Your Updated Medication List - Protect others around you: Learn how to safely use, store and throw away your medicines at www.disposemymeds.org.          This list is accurate as of 10/19/18 11:36 AM.  Always use your most recent med list.                   Brand Name Dispense Instructions for use Diagnosis    ferrous sulfate 325 (65 Fe) MG tablet    IRON    30 tablet    Take 1 tablet (325 mg) by mouth daily (with breakfast)    Other iron deficiency anemia       prenatal multivitamin plus iron 27-0.8 MG Tabs per tablet      Take 1 tablet by mouth daily

## 2018-10-20 LAB
ALBUMIN SERPL-MCNC: 2.9 G/DL (ref 3.4–5)
ALP SERPL-CCNC: 68 U/L (ref 40–150)
ALT SERPL W P-5'-P-CCNC: 15 U/L (ref 0–50)
ANION GAP SERPL CALCULATED.3IONS-SCNC: 10 MMOL/L (ref 3–14)
AST SERPL W P-5'-P-CCNC: 18 U/L (ref 0–45)
BILIRUB SERPL-MCNC: 0.2 MG/DL (ref 0.2–1.3)
BUN SERPL-MCNC: 6 MG/DL (ref 7–30)
CALCIUM SERPL-MCNC: 8.8 MG/DL (ref 8.5–10.1)
CHLORIDE SERPL-SCNC: 104 MMOL/L (ref 94–109)
CO2 SERPL-SCNC: 22 MMOL/L (ref 20–32)
CREAT SERPL-MCNC: 0.48 MG/DL (ref 0.52–1.04)
GFR SERPL CREATININE-BSD FRML MDRD: >90 ML/MIN/1.7M2
GLUCOSE SERPL-MCNC: 98 MG/DL (ref 70–99)
POTASSIUM SERPL-SCNC: 3.7 MMOL/L (ref 3.4–5.3)
PROT SERPL-MCNC: 7.8 G/DL (ref 6.8–8.8)
SODIUM SERPL-SCNC: 136 MMOL/L (ref 133–144)
URATE SERPL-MCNC: 3.4 MG/DL (ref 2.6–6)

## 2018-10-21 LAB
B2 GLYCOPROT1 IGG SERPL IA-ACNC: <0.6 U/ML
B2 GLYCOPROT1 IGM SERPL IA-ACNC: <0.9 U/ML
CARDIOLIPIN ANTIBODY IGG: <1.6 GPL-U/ML (ref 0–19.9)
CARDIOLIPIN ANTIBODY IGM: 0.2 MPL-U/ML (ref 0–19.9)

## 2018-10-23 LAB — LA PPP-IMP: NEGATIVE

## 2018-10-29 ENCOUNTER — PRENATAL OFFICE VISIT (OUTPATIENT)
Dept: OBGYN | Facility: CLINIC | Age: 28
End: 2018-10-29
Payer: COMMERCIAL

## 2018-10-29 VITALS — SYSTOLIC BLOOD PRESSURE: 100 MMHG | WEIGHT: 161 LBS | BODY MASS INDEX: 27.64 KG/M2 | DIASTOLIC BLOOD PRESSURE: 70 MMHG

## 2018-10-29 DIAGNOSIS — O09.299 HX OF PRE-ECLAMPSIA IN PRIOR PREGNANCY, CURRENTLY PREGNANT: ICD-10-CM

## 2018-10-29 DIAGNOSIS — O09.299 PRIOR PREGNANCY WITH FETAL DEMISE: ICD-10-CM

## 2018-10-29 DIAGNOSIS — O09.92 SUPERVISION OF HIGH RISK PREGNANCY IN SECOND TRIMESTER: ICD-10-CM

## 2018-10-29 DIAGNOSIS — B35.4 TINEA CORPORIS: Primary | ICD-10-CM

## 2018-10-29 PROCEDURE — 99207 ZZC COMPLICATED OB VISIT: CPT | Performed by: OBSTETRICS & GYNECOLOGY

## 2018-10-29 RX ORDER — MICONAZOLE NITRATE 20 MG/G
1 CREAM TOPICAL 2 TIMES DAILY
Qty: 15 G | Refills: 1 | Status: ON HOLD | OUTPATIENT
Start: 2018-10-29 | End: 2019-02-26

## 2018-10-29 NOTE — NURSING NOTE
"Chief Complaint   Patient presents with     Prenatal Care       Initial /70  Wt 161 lb (73 kg)  LMP 2018 (Exact Date)  BMI 27.64 kg/m2 Estimated body mass index is 27.64 kg/(m^2) as calculated from the following:    Height as of 18: 5' 4\" (1.626 m).    Weight as of this encounter: 161 lb (73 kg).  BP completed using cuff size: regular    Questioned patient about current smoking habits.  Pt. has never smoked.          The following HM Due: NONE    +fetal movement    Sylvia Talavera, CMA             "

## 2018-10-29 NOTE — PROGRESS NOTES
28 year old  at 21w2d weeks presents to the clinic for a routine prenatal visit.  Feeling well.  No vaginal bleeding, leakage of fluid, or cramping/contractions.     present for this visit.     Notes rash areas on her inner legs that come and go.    /70  Wt 161 lb (73 kg)  LMP 2018 (Exact Date)  BMI 27.64 kg/m2    Extremities: each inner upper leg has 2-3 circular erythematous areas consistent with tinea corporis     1. Supervision of high risk pregnancy in second trimester  - A+ / RI , + antibody screen - resolved  - Flu vaccination received  - Normal anatomical scan with MFM  - Tdap @ 27+ weeks    2. H/O pre-eclampsia (severe) in prior pregnancy, currently pregnant, second trimester  - Following with MFM  - ASA 81mg daily  - Signs and symptoms reviewed, discussed when to call  - Baseline labs obtained, normal  - Normotensive in this pregnancy  - Growth ultrasound q 4 weeks, BPPs starting at 28 weeks    3. Prior pregnancy with fetal demise (in [place])  - Diagnosed with preeclampsia @ 28 weeks, fetal demise at 30 weeks  - Following with MFM  - Antiphospholipid Syndrome Labs obtained -- all normal   - Lupus Anticoagulant Panel   - Beta 2 Glycoprotein Antibodies IGG IGM   - Cardiolipin Hermila IgG and IgM  - See above    4. GBS bacteriuria  - Treated   - PCN in labor    5. Tinea corporis  - miconazole (MICATIN) 2 % cream; Apply 1 g topically 2 times daily  Dispense: 15 g; Refill: 1      Return to clinic every 2 weeks for visit, blood pressure check.     Concha Prather, DO  Obstetrics and Gynecology

## 2018-10-29 NOTE — MR AVS SNAPSHOT
After Visit Summary   10/29/2018    Elidia Barnes    MRN: [id number]           Patient Information     Date Of Birth          1990        Visit Information        Provider Department      10/29/2018 11:00 AM Concha Prather DO; [employer] [facility]        Today's Diagnoses     Tinea corporis    -  1    Supervision of high risk pregnancy in second trimester        Hx of pre-eclampsia in prior pregnancy, currently pregnant        Prior pregnancy with fetal demise           Follow-ups after your visit        Follow-up notes from your care team     Return in about 2 weeks (around 11/12/2018).      Your next 10 appointments already scheduled     Oct 30, 2018  9:30 AM CDT   [facility]   [facility] Maternal Fetal Medicine Ultrasound - [facility])    [address]  [facility] [address] [phone]           Wear comfortable clothes and leave your valuables at home.            Oct 30, 2018 10:00 AM CDT   Radiology MD with [facility] MD   [facility] Maternal Fetal Medicine - [facility])    [address]  [facility] [address] [phone]           Please arrive at the time given for your first appointment. This visit is used internally to schedule the physician's time during your ultrasound.              Who to contact     If you have questions or need follow up information about today's clinic visit or your schedule please contact [facility] directly at [phone].  Normal or non-critical lab and imaging results will be communicated to you by MyChart, letter or phone within 4 business days after the clinic has received the results. If you do not hear from us within 7 days, please contact the clinic through MyChart or phone. If you have a critical or abnormal lab result, we will notify you by phone as soon as possible.  Submit refill  requests through Incanthera or call your pharmacy and they will forward the refill request to us. Please allow 3 business days for your refill to be completed.          Additional Information About Your Visit        Care EveryWhere ID     This is your Care EveryWhere ID. This could be used by other organizations to access your [place] medical records  [id number]        Your Vitals Were     Last Period BMI (Body Mass Index)                05/21/2018 (Exact Date) 27.64 kg/m2           Blood Pressure from Last 3 Encounters:   10/29/18 100/70   10/19/18 118/70   09/19/18 90/60    Weight from Last 3 Encounters:   10/29/18 161 lb (73 kg)   10/19/18 160 lb (72.6 kg)   09/19/18 154 lb (69.9 kg)              Today, you had the following     No orders found for display         Today's Medication Changes          These changes are accurate as of 10/29/18 11:41 AM.  If you have any questions, ask your nurse or doctor.               Start taking these medicines.        Dose/Directions    miconazole 2 % cream   Commonly known as:  MICATIN   Used for:  Tinea corporis        Dose:  1 g   Apply 1 g topically 2 times daily   Quantity:  15 g   Refills:  1            Where to get your medicines      These medications were sent to [facility] [place] [id number]     Phone:  [phone]     miconazole 2 % cream                Primary Care Provider Office Phone # Fax #    [facility] [phone]       [address] [place] [address]        Equal Access to Services     [facility] PRANAV AH: Hadii aad ku hadasho Soomaali, waaxda luqadaha, qaybta kaalmada adeegyada, morris bustos. So [facility] [phone].    ATENCIÓN: Si habla español, tiene a aguero disposición servicios gratuitos de asistencia lingüística. Llame al [phone].    We comply with applicable federal civil rights laws and [place] laws.  We do not discriminate on the basis of race, color, national origin, age, disability, sex, sexual orientation, or gender identity.            Thank you!     Thank you for choosing [facility]  for your care. Our goal is always to provide you with excellent care. Hearing back from our patients is one way we can continue to improve our services. Please take a few minutes to complete the written survey that you may receive in the mail after your visit with us. Thank you!             Your Updated Medication List - Protect others around you: Learn how to safely use, store and throw away your medicines at www.disposemymeds.org.          This list is accurate as of 10/29/18 11:41 AM.  Always use your most recent med list.                   Brand Name Dispense Instructions for use Diagnosis    ferrous sulfate 325 (65 Fe) MG tablet    IRON    30 tablet    Take 1 tablet (325 mg) by mouth daily (with breakfast)    Other iron deficiency anemia       miconazole 2 % cream    MICATIN    15 g    Apply 1 g topically 2 times daily    Tinea corporis       prenatal multivitamin plus iron 27-0.8 MG Tabs per tablet      Take 1 tablet by mouth daily

## 2018-10-30 ENCOUNTER — HOSPITAL ENCOUNTER (OUTPATIENT)
Dept: ULTRASOUND IMAGING | Facility: CLINIC | Age: 28
Discharge: HOME | End: 2018-10-30
Attending: OBSTETRICS & GYNECOLOGY | Admitting: OBSTETRICS & GYNECOLOGY
Payer: COMMERCIAL

## 2018-10-30 ENCOUNTER — OFFICE VISIT (OUTPATIENT)
Dept: MATERNAL FETAL MEDICINE | Facility: CLINIC | Age: 28
End: 2018-10-30
Attending: OBSTETRICS & GYNECOLOGY
Payer: COMMERCIAL

## 2018-10-30 DIAGNOSIS — O09.299 PRIOR PREGNANCY WITH FETAL DEMISE: Primary | ICD-10-CM

## 2018-10-30 DIAGNOSIS — O09.299 HX OF PRE-ECLAMPSIA IN PRIOR PREGNANCY, CURRENTLY PREGNANT: ICD-10-CM

## 2018-10-30 DIAGNOSIS — O26.90 PREGNANCY RELATED CONDITION, ANTEPARTUM: ICD-10-CM

## 2018-10-30 DIAGNOSIS — O35.9XX0 SUSPECTED FETAL ANOMALY, ANTEPARTUM, SINGLE OR UNSPECIFIED FETUS: ICD-10-CM

## 2018-10-30 PROCEDURE — 76816 OB US FOLLOW-UP PER FETUS: CPT

## 2018-10-30 NOTE — MR AVS SNAPSHOT
After Visit Summary   10/30/2018    Elidia Barnes    MRN: [id number]           Patient Information     Date Of Birth          1990        Visit Information        Provider Department      10/30/2018 10:00 AM Humera Vieira MD [facility] Maternal Fetal Medicine [facility]        Today's Diagnoses     Prior pregnancy with fetal demise    -  1    Hx of pre-eclampsia in prior pregnancy, currently pregnant           Follow-ups after your visit        Your next 10 appointments already scheduled     Nov 09, 2018  2:15 PM CST   ESTABLISHED PRENATAL with Concha Prather DO   [facility]    [address]  [address]  [facility] [address]   [phone]            Nov 27, 2018  9:30 AM CST   MFM US COMPRE SINGLE F/U with MFMUSISAC   [facility] Maternal Fetal Medicine Ultrasound - [facility])    [address]  [facility] [address]   [phone]           Wear comfortable clothes and leave your valuables at home.            Nov 27, 2018 10:00 AM CST   Radiology MD with  PATRICIA MONTOYA   [facility] Maternal Fetal Medicine [facility] ([facility])    [address]  [facility] [id number] [phone]           Please arrive at the time given for your first appointment. This visit is used internally to schedule the physician's time during your ultrasound.              Future tests that were ordered for you today     Open Future Orders        Priority Expected Expires Ordered    MFM US Comprehensive Single F/U Routine 11/27/2018 10/30/2019 10/30/2018            Who to contact     If you have questions or need follow up information about today's clinic visit or your schedule please contact [facility] MATERNAL FETAL MEDICINE [facility] directly at [phone].  Normal or non-critical lab and imaging results will be communicated to you by MyChart, letter or phone within 4 business days after the  clinic has received the results. If you do not hear from us within 7 days, please contact the clinic through [employer] or phone. If you have a critical or abnormal lab result, we will notify you by phone as soon as possible.  Submit refill requests through [employer] or call your pharmacy and they will forward the refill request to us. Please allow 3 business days for your refill to be completed.          Additional Information About Your Visit        Care EveryWhere ID     This is your Care EveryWhere ID. This could be used by other organizations to access your [place] medical records  [id number]        Your Vitals Were     Last Period                   05/21/2018 (Exact Date)            Blood Pressure from Last 3 Encounters:   10/29/18 100/70   10/19/18 118/70   09/19/18 90/60    Weight from Last 3 Encounters:   10/29/18 73 kg (161 lb)   10/19/18 72.6 kg (160 lb)   09/19/18 69.9 kg (154 lb)               Primary Care Provider Office Phone # Fax #    [facility] [phone]       [address] [place] [address]        Equal Access to Services     JOHNNIE ROCK : Hadii nadeem ku hadasho Soellenali, waaxda luqadaha, qaybta kaalmada davidson, morris bustos. So [facility] [phone].    ATENCIÓN: Si habla español, tiene a aguero disposición servicios gratuitos de asistencia lingüística. Rina al [phone].    We comply with applicable federal civil rights laws and [place] laws. We do not discriminate on the basis of race, color, national origin, age, disability, sex, sexual orientation, or gender identity.            Thank you!     Thank you for choosing [facility]  for your care. Our goal is always to provide you with excellent care. Hearing back from our patients is one way we can continue to improve our services. Please take a few minutes to complete the written survey that you may receive in the mail after your visit with us.  Thank you!             Your Updated Medication List - Protect others around you: Learn how to safely use, store and throw away your medicines at www.disposemymeds.org.          This list is accurate as of 10/30/18  2:09 PM.  Always use your most recent med list.                   Brand Name Dispense Instructions for use Diagnosis    ferrous sulfate 325 (65 Fe) MG tablet    IRON    30 tablet    Take 1 tablet (325 mg) by mouth daily (with breakfast)    Other iron deficiency anemia       miconazole 2 % cream    MICATIN    15 g    Apply 1 g topically 2 times daily    Tinea corporis       prenatal multivitamin plus iron 27-0.8 MG Tabs per tablet      Take 1 tablet by mouth daily

## 2018-10-30 NOTE — PROGRESS NOTES
"Please see \"Imaging\" tab under \"Chart Review\" for details of today's visit.    Humera Vieira    "

## 2018-11-09 ENCOUNTER — PRENATAL OFFICE VISIT (OUTPATIENT)
Dept: OBGYN | Facility: CLINIC | Age: 28
End: 2018-11-09
Payer: COMMERCIAL

## 2018-11-09 VITALS — DIASTOLIC BLOOD PRESSURE: 70 MMHG | BODY MASS INDEX: 28.84 KG/M2 | WEIGHT: 168 LBS | SYSTOLIC BLOOD PRESSURE: 100 MMHG

## 2018-11-09 DIAGNOSIS — O09.299 HX OF PRE-ECLAMPSIA IN PRIOR PREGNANCY, CURRENTLY PREGNANT: ICD-10-CM

## 2018-11-09 DIAGNOSIS — O09.92 SUPERVISION OF HIGH RISK PREGNANCY IN SECOND TRIMESTER: Primary | ICD-10-CM

## 2018-11-09 PROCEDURE — 99207 ZZC COMPLICATED OB VISIT: CPT | Performed by: OBSTETRICS & GYNECOLOGY

## 2018-11-09 NOTE — PROGRESS NOTES
28 year old  at 22w6d weeks presents to the clinic for a routine prenatal visit.  Feeling well.  No vaginal bleeding, leakage of fluid, or cramping/contractions. +     present for this visit.     Rash present at last visit has resolved.    /70  Wt 168 lb (76.2 kg)  LMP 2018 (Exact Date)  BMI 28.84 kg/m2    1. Supervision of high risk pregnancy in second trimester  - A+ / RI , + antibody screen - resolved  - Flu vaccination received  - Normal anatomical scan with MFM  - Tdap @ 27+ weeks  - Glucola, syphilis, CBC next visit    2. H/O pre-eclampsia (severe) in prior pregnancy, currently pregnant, second trimester  - Following with MFM  - ASA 81mg daily  - Signs and symptoms reviewed, discussed when to call  - Baseline labs obtained, normal  - Normotensive in this pregnancy  - Growth ultrasound q 4 weeks, BPPs starting at 28 weeks    3. Prior pregnancy with fetal demise (in [place])  - Diagnosed with preeclampsia @ 28 weeks, fetal demise at 30 weeks  - Following with MFM  - Antiphospholipid Syndrome Labs obtained -- all normal   - Lupus Anticoagulant Panel   - Beta 2 Glycoprotein Antibodies IGG IGM   - Cardiolipin Hermila IgG and IgM  - See above    4. GBS bacteriuria  - Treated   - PCN in labor    Return to clinic every 2 weeks for visit, blood pressure check.     Concha Prather, DO  Obstetrics and Gynecology

## 2018-11-09 NOTE — NURSING NOTE
"Chief Complaint   Patient presents with     Prenatal Care       Initial /70  Wt 168 lb (76.2 kg)  LMP 2018 (Exact Date)  BMI 28.84 kg/m2 Estimated body mass index is 28.84 kg/(m^2) as calculated from the following:    Height as of 18: 5' 4\" (1.626 m).    Weight as of this encounter: 168 lb (76.2 kg).  BP completed using cuff size: regular    Questioned patient about current smoking habits.  Pt. has never smoked.          The following HM Due: NONE    +fetal movement    Sylvia Talavera, CMA      "

## 2018-11-09 NOTE — MR AVS SNAPSHOT
After Visit Summary   11/9/2018    Elidia Barnes    MRN: [id number]           Patient Information     Date Of Birth          1990        Visit Information        Provider Department      11/9/2018 2:00 PM Concha Prather DO; [employer] [facility]        Today's Diagnoses     Supervision of high risk pregnancy in second trimester    -  1    Hx of pre-eclampsia in prior pregnancy, currently pregnant           Follow-ups after your visit        Follow-up notes from your care team     Return in about 2 weeks (around 11/23/2018).      Your next 10 appointments already scheduled     Nov 27, 2018  9:30 AM CST   MFM US COMPRE SINGLE F/U with [email or web address]   [facility] Maternal Fetal Medicine Ultrasound - [facility])    [address] [facility] [address]  [facility] [address] [phone]           Wear comfortable clothes and leave your valuables at home.            Nov 27, 2018 10:00 AM CST   [facility]   [facility]    [address]  [facility] [address] [phone]           Please arrive at the time given for your first appointment. This visit is used internally to schedule the physician's time during your ultrasound.              Who to contact     If you have questions or need follow up information about today's clinic visit or your schedule please contact [facility] directly at [phone].  Normal or non-critical lab and imaging results will be communicated to you by MyChart, letter or phone within 4 business days after the clinic has received the results. If you do not hear from us within 7 days, please contact the clinic through MyChart or phone. If you have a critical or abnormal lab result, we will notify you by phone as soon as possible.  Submit refill requests through [employer] or call your pharmacy and they will forward  the refill request to us. Please allow 3 business days for your refill to be completed.          Additional Information About Your Visit        Care EveryWhere ID     This is your Care EveryWhere ID. This could be used by other organizations to access your [place] medical records  [id number]        Your Vitals Were     Last Period BMI (Body Mass Index)                05/21/2018 (Exact Date) 28.84 kg/m2           Blood Pressure from Last 3 Encounters:   11/09/18 100/70   10/29/18 100/70   10/19/18 118/70    Weight from Last 3 Encounters:   11/09/18 168 lb (76.2 kg)   10/29/18 161 lb (73 kg)   10/19/18 160 lb (72.6 kg)              Today, you had the following     No orders found for display       Primary Care Provider Office Phone # Fax #    [facility] [phone]       [address] [place] [address]        Equal Access to Services     EL [place] : Hadii nadeem ku hadasho Soellenali, waaxda luqadaha, qaybta kaalmada adeegyada, waxay reganin hayneenan randall sheets . So [facility] [phone].    ATENCIÓN: Si habla español, tiene a aguero disposición servicios gratuitos de asistencia lingüística. Llame al [phone].    We comply with applicable federal civil rights laws and [place] laws. We do not discriminate on the basis of race, color, national origin, age, disability, sex, sexual orientation, or gender identity.            Thank you!     Thank you for choosing [facility]  for your care. Our goal is always to provide you with excellent care. Hearing back from our patients is one way we can continue to improve our services. Please take a few minutes to complete the written survey that you may receive in the mail after your visit with us. Thank you!             Your Updated Medication List - Protect others around you: Learn how to safely use, store and throw away your medicines at www.disposemymeds.org.          This list is accurate as of 11/9/18 11:59 PM.   Always use your most recent med list.                   Brand Name Dispense Instructions for use Diagnosis    ferrous sulfate 325 (65 Fe) MG tablet    IRON    30 tablet    Take 1 tablet (325 mg) by mouth daily (with breakfast)    Other iron deficiency anemia       miconazole 2 % cream    MICATIN    15 g    Apply 1 g topically 2 times daily    Tinea corporis       prenatal multivitamin plus iron 27-0.8 MG Tabs per tablet      Take 1 tablet by mouth daily

## 2018-11-16 ENCOUNTER — TRANSFERRED RECORDS (OUTPATIENT)
Dept: HEALTH INFORMATION MANAGEMENT | Facility: CLINIC | Age: 28
End: 2018-11-16

## 2018-11-16 ENCOUNTER — RECORDS - HEALTHEAST (OUTPATIENT)
Dept: LAB | Facility: CLINIC | Age: 28
End: 2018-11-16

## 2018-11-17 LAB — T PALLIDUM AB SER QL: NEGATIVE

## 2018-11-19 ENCOUNTER — PRENATAL OFFICE VISIT (OUTPATIENT)
Dept: OBGYN | Facility: CLINIC | Age: 28
End: 2018-11-19
Payer: COMMERCIAL

## 2018-11-19 VITALS — DIASTOLIC BLOOD PRESSURE: 62 MMHG | WEIGHT: 167 LBS | BODY MASS INDEX: 28.67 KG/M2 | SYSTOLIC BLOOD PRESSURE: 104 MMHG

## 2018-11-19 DIAGNOSIS — O09.92 SUPERVISION OF HIGH RISK PREGNANCY IN SECOND TRIMESTER: Primary | ICD-10-CM

## 2018-11-19 LAB
GAMMA INTERFERON BACKGROUND BLD IA-ACNC: 0.06 IU/ML
M TB IFN-G BLD-IMP: POSITIVE
MITOGEN IGNF BCKGRD COR BLD-ACNC: 2.46 IU/ML
MITOGEN IGNF BCKGRD COR BLD-ACNC: 9.06 IU/ML
N GONORRHOEA DNA SPEC QL NAA+PROBE: NEGATIVE
QTF INTERPRETATION: ABNORMAL
QTF MITOGEN - NIL: 0.76 IU/ML

## 2018-11-19 PROCEDURE — 99207 ZZC PRENATAL VISIT: CPT | Performed by: OBSTETRICS & GYNECOLOGY

## 2018-11-19 NOTE — PROGRESS NOTES
28 year old  at 24w2d weeks presents to the clinic for a routine prenatal visit.  Feeling well.  No vaginal bleeding, leakage of fluid, or cramping/contractions. +    Letter requested regarding current vaccinations.       /62  Wt 167 lb (75.8 kg)  LMP 2018 (Exact Date)  BMI 28.67 kg/m2    1. Supervision of high risk pregnancy in second trimester  - A+ / RI , + antibody screen - resolved  - Flu vaccination received  - Normal anatomical scan with MFM  - Tdap @ 27+ weeks  - Glucola, syphilis, CBC at 26-28 weeks    2. H/O pre-eclampsia (severe) in prior pregnancy, currently pregnant, second trimester  - Following with MFM  - ASA 81mg daily  - Signs and symptoms reviewed, discussed when to call  - Baseline labs obtained, normal  - Normotensive in this pregnancy  - Growth ultrasound q 4 weeks, BPPs starting at 28 weeks    3. Prior pregnancy with fetal demise (in [place])  - Diagnosed with preeclampsia @ 28 weeks, fetal demise at 30 weeks  - Following with MFM  - Antiphospholipid Syndrome Labs obtained -- all normal   - Lupus Anticoagulant Panel   - Beta 2 Glycoprotein Antibodies IGG IGM   - Cardiolipin Hermila IgG and IgM  - See above    4. GBS bacteriuria  - Treated   - PCN in labor    Return to clinic every 2 weeks for visit, blood pressure check.     Concha Prather, DO  Obstetrics and Gynecology

## 2018-11-19 NOTE — NURSING NOTE
"Chief Complaint   Patient presents with     Prenatal Care       Initial /62  Wt 167 lb (75.8 kg)  LMP 2018 (Exact Date)  BMI 28.67 kg/m2 Estimated body mass index is 28.67 kg/(m^2) as calculated from the following:    Height as of 18: 5' 4\" (1.626 m).    Weight as of this encounter: 167 lb (75.8 kg).  BP completed using cuff size: regular    Questioned patient about current smoking habits.  Pt. has never smoked.          The following HM Due: NONE    Sylvia Talavera CMA    "

## 2018-11-19 NOTE — MR AVS SNAPSHOT
After Visit Summary   11/19/2018    Elidia Barnes    MRN: [id number]           Patient Information     Date Of Birth          1990        Visit Information        Provider Department      11/19/2018 10:15 AM Concha Prather DO; [employer] [facility]        Today's Diagnoses     Supervision of high risk pregnancy in second trimester    -  1       Follow-ups after your visit        Follow-up notes from your care team     Return in about 2 weeks (around 12/3/2018).      Your next 10 appointments already scheduled     Nov 21, 2018 10:45 AM CST   Nurse Only with RI OB NURSE   [facility]    [address] [facility] [address]   [phone]            Nov 27, 2018  9:30 AM CST   MFM US COMPRE SINGLE F/U with [id number]   [facility] Maternal Fetal Medicine Ultrasound - [facility])    [address]  [facility] [address] [phone]           Wear comfortable clothes and leave your valuables at home.            Nov 27, 2018 10:00 AM CST   Radiology MD with  MFM MD   [facility])    [address]  [facility] [address] [phone]           Please arrive at the time given for your first appointment. This visit is used internally to schedule the physician's time during your ultrasound.            Dec 10, 2018 11:00 AM CST   ESTABLISHED PRENATAL with Concha Prather DO   [facility])    [address]  [address]  [facility] [address]   [phone]            Dec 26, 2018  9:45 AM CST   Nurse Only with RI OB NURSE   [facility])    [address]  [facility] [address]   [phone]            Jan 07, 2019 10:45 AM CST   ESTABLISHED PRENATAL with Concha Prather DO    [facility]    [address]  [address]  [facility] [id number] [phone]              Who to contact     If you have questions or need follow up information about today's clinic visit or your schedule please contact [facility] directly at [phone].  Normal or non-critical lab and imaging results will be communicated to you by MyChart, letter or phone within 4 business days after the clinic has received the results. If you do not hear from us within 7 days, please contact the clinic through MyChart or phone. If you have a critical or abnormal lab result, we will notify you by phone as soon as possible.  Submit refill requests through [employer] or call your pharmacy and they will forward the refill request to us. Please allow 3 business days for your refill to be completed.          Additional Information About Your Visit        Care EveryWhere ID     This is your Care EveryWhere ID. This could be used by other organizations to access your [place] medical records  [id number]        Your Vitals Were     Last Period BMI (Body Mass Index)                05/21/2018 (Exact Date) 28.67 kg/m2           Blood Pressure from Last 3 Encounters:   11/19/18 104/62   11/09/18 100/70   10/29/18 100/70    Weight from Last 3 Encounters:   11/19/18 167 lb (75.8 kg)   11/09/18 168 lb (76.2 kg)   10/29/18 161 lb (73 kg)              Today, you had the following     No orders found for display       Primary Care Provider Office Phone # Fax #    [facility] [phone]       [address] [place] [address] [facility] [address]        Equal Access to Services     [facility] [place]CL[place] : Hadii aad ku hadasho Soomaali, waaxda luqadaha, qaybta kaalmada adeegyada, mroris bustos. So [facility] [phone].    ATENCIÓN: Si habla español, tiene a aguero disposición servicios gratuitos de asistencia lingüística. Llame al  [phone].    We comply with applicable federal civil rights laws and [place] laws. We do not discriminate on the basis of race, color, national origin, age, disability, sex, sexual orientation, or gender identity.            Thank you!     Thank you for choosing [facility]  for your care. Our goal is always to provide you with excellent care. Hearing back from our patients is one way we can continue to improve our services. Please take a few minutes to complete the written survey that you may receive in the mail after your visit with us. Thank you!             Your Updated Medication List - Protect others around you: Learn how to safely use, store and throw away your medicines at www.disposemymeds.org.          This list is accurate as of 11/19/18 11:05 AM.  Always use your most recent med list.                   Brand Name Dispense Instructions for use Diagnosis    ferrous sulfate 325 (65 Fe) MG tablet    IRON    30 tablet    Take 1 tablet (325 mg) by mouth daily (with breakfast)    Other iron deficiency anemia       miconazole 2 % cream    MICATIN    15 g    Apply 1 g topically 2 times daily    Tinea corporis       prenatal multivitamin plus iron 27-0.8 MG Tabs per tablet      Take 1 tablet by mouth daily

## 2018-11-19 NOTE — LETTER
11/19/2018        To Whom It May Concern,        Elidia Barnes is currently pregnant. She will received her Tdap vaccination when she is 27-28 weeks gestation. The MMR vaccination is contraindicated in pregnancy however she is Rubella Immune based on her first trimester blood work. She also tested negative for Hepatitis B. If this vaccination series is needed, it will need to be performed post partum.     Sincerely,           Concha Prather, DO  OB/GYN

## 2018-11-20 ENCOUNTER — TRANSFERRED RECORDS (OUTPATIENT)
Dept: HEALTH INFORMATION MANAGEMENT | Facility: CLINIC | Age: 28
End: 2018-11-20

## 2018-11-20 ENCOUNTER — MEDICAL CORRESPONDENCE (OUTPATIENT)
Dept: HEALTH INFORMATION MANAGEMENT | Facility: CLINIC | Age: 28
End: 2018-11-20

## 2018-11-20 ENCOUNTER — TELEPHONE (OUTPATIENT)
Dept: OBGYN | Facility: CLINIC | Age: 28
End: 2018-11-20

## 2018-11-21 ENCOUNTER — TELEPHONE (OUTPATIENT)
Dept: OBGYN | Facility: CLINIC | Age: 28
End: 2018-11-21

## 2018-11-21 ENCOUNTER — TRANSFERRED RECORDS (OUTPATIENT)
Dept: HEALTH INFORMATION MANAGEMENT | Facility: CLINIC | Age: 28
End: 2018-11-21

## 2018-11-21 ENCOUNTER — ALLIED HEALTH/NURSE VISIT (OUTPATIENT)
Dept: NURSING | Facility: CLINIC | Age: 28
End: 2018-11-21
Payer: COMMERCIAL

## 2018-11-21 VITALS — DIASTOLIC BLOOD PRESSURE: 70 MMHG | SYSTOLIC BLOOD PRESSURE: 110 MMHG

## 2018-11-21 DIAGNOSIS — Z87.59 H/O SEVERE PRE-ECLAMPSIA: Primary | ICD-10-CM

## 2018-11-21 PROCEDURE — 99207 ZZC NO CHARGE NURSE ONLY: CPT

## 2018-11-21 NOTE — NURSING NOTE
"Chief Complaint   Patient presents with     Allied Health Visit     Blood Pressure check      Forms     Regarding x-ray for positive TB test       Initial /70 (BP Location: Left arm, Patient Position: Chair, Cuff Size: Adult Regular)  LMP 2018 (Exact Date) Estimated body mass index is 28.67 kg/(m^2) as calculated from the following:    Height as of 18: 5' 4\" (1.626 m).    Weight as of 18: 167 lb (75.8 kg).  BP completed using cuff size: regular    Questioned patient about current smoking habits.  Pt. has never smoked.          The following HM Due: NONE    Mary Dunbar CMA               "

## 2018-11-21 NOTE — TELEPHONE ENCOUNTER
Called and confirmed with patient that Chest Xray was OK, she stated Dr Prather called her this am and told her to go ahead and get it.  Sylvia Talavera, CMA

## 2018-11-22 NOTE — TELEPHONE ENCOUNTER
Met with patient and  today. Radiation exposure during a chest xray is very low. Okay to proceed with Chest Xray as recommended as risk without proper diagnosis of TB is greater than risk of radiation to fetus during xray. This was discussed with the couple and they are in agreement.      Concha Prather, DO  OB/GYN

## 2018-11-27 ENCOUNTER — OFFICE VISIT (OUTPATIENT)
Dept: MATERNAL FETAL MEDICINE | Facility: CLINIC | Age: 28
End: 2018-11-27
Attending: OBSTETRICS & GYNECOLOGY
Payer: COMMERCIAL

## 2018-11-27 ENCOUNTER — HOSPITAL ENCOUNTER (OUTPATIENT)
Dept: ULTRASOUND IMAGING | Facility: CLINIC | Age: 28
Discharge: HOME | End: 2018-11-27
Attending: OBSTETRICS & GYNECOLOGY | Admitting: OBSTETRICS & GYNECOLOGY
Payer: COMMERCIAL

## 2018-11-27 DIAGNOSIS — O09.299 PRIOR PREGNANCY WITH FETAL DEMISE: ICD-10-CM

## 2018-11-27 DIAGNOSIS — O09.299 HX OF PRE-ECLAMPSIA IN PRIOR PREGNANCY, CURRENTLY PREGNANT: ICD-10-CM

## 2018-11-27 DIAGNOSIS — O09.299 PRIOR PREGNANCY WITH FETAL DEMISE: Primary | ICD-10-CM

## 2018-11-27 PROCEDURE — 76816 OB US FOLLOW-UP PER FETUS: CPT

## 2018-11-27 NOTE — PROGRESS NOTES
Please see ultrasound report under imaging tab for details on ultrasound performed today.    Kristine Paredes MD  , OB/GYN  Maternal-Fetal Medicine  [email or web address]  [phone] (Academic office)  [phone] (Pager)

## 2018-11-27 NOTE — MR AVS SNAPSHOT
After Visit Summary   11/27/2018    Elidia Barnes    MRN: [id number]           Patient Information     Date Of Birth          1990        Visit Information        Provider Department      11/27/2018 10:00 AM Kristine Paredes MD [facility] Maternal Fetal [facility]        Today's Diagnoses     Prior pregnancy with fetal demise    -  1    Hx of pre-eclampsia in prior pregnancy, currently pregnant           Follow-ups after your visit        Your next 10 appointments already scheduled     Dec 10, 2018 11:00 AM CST   ESTABLISHED PRENATAL with Concha Prather DO   [facility] ([facility])    [address]  [address]  [facility] [address]   [phone]            Dec 21, 2018 11:00 AM CST   MFM US COMPRE SINGLE F/U with MFMUSR2   [facility] Maternal Fetal Medicine Ultrasound - [facility])    [address] [facility] [address]  [facility] [id number] [phone]           Wear comfortable clothes and leave your valuables at home.            Dec 21, 2018 11:30 AM CST   Radiology MD with  MFDEJUAN MONTOYA   [facility] M[facility] Fetal [facility] ([facility])    [address] [address]  [facility] [id number] [phone]           Please arrive at the time given for your first appointment. This visit is used internally to schedule the physician's time during your ultrasound.            Dec 26, 2018  9:45 AM CST   Nurse Only with RI OB NURSE   [facility] ([facility])    [address]  [facility] [id number] [phone]            Jan 07, 2019 10:45 AM CST   ESTABLISHED PRENATAL with Concha Prather DO   [facility] ([facility])    [address]  [address]  [facility] [id number] [phone]              Future tests that were ordered for you today     Open Future Orders        Priority Expected Expires Ordered     [facility] Comprehensive Single F/U Routine 12/18/2018 11/27/2019 11/27/2018            Who to contact     If you have questions or need follow up information about today's clinic visit or your schedule please contact [facility] directly at [phone].  Normal or non-critical lab and imaging results will be communicated to you by MyChart, letter or phone within 4 business days after the clinic has received the results. If you do not hear from us within 7 days, please contact the clinic through MyChart or phone. If you have a critical or abnormal lab result, we will notify you by phone as soon as possible.  Submit refill requests through [facility] [employer] or call your pharmacy and they will forward the refill request to us. Please allow 3 business days for your refill to be completed.          Additional Information About Your Visit        Care EveryWhere ID     This is your Care EveryWhere ID. This could be used by other organizations to access your [facility] medical records  [id number]        Your Vitals Were     Last Period                   05/21/2018 (Exact Date)            Blood Pressure from Last 3 Encounters:   11/21/18 110/70   11/19/18 104/62   11/09/18 100/70    Weight from Last 3 Encounters:   11/19/18 75.8 kg (167 lb)   11/09/18 76.2 kg (168 lb)   10/29/18 73 kg (161 lb)               Primary Care Provider Office Phone # Fax #    [facility] [phone]       [address] [place] [address]        Equal Access to Services     EL [place] AH: Hadii aad ku hadasho Soomaali, waaxda luqadaha, qaybta kaalmada adeegyada, waxay idiin hayaan randall salgueroaragriselda salinas'levon . So [facility] [phone].    ATENCIÓN: Si habla español, tiene a aguero disposición servicios gratuitos de asistencia lingüística. Llame al [phone].    We comply with applicable federal civil rights laws and [place] laws. We do not discriminate on the basis of race, color, national origin,  age, disability, sex, sexual orientation, or gender identity.            Thank you!     Thank you for choosing [facility]  for your care. Our goal is always to provide you with excellent care. Hearing back from our patients is one way we can continue to improve our services. Please take a few minutes to complete the written survey that you may receive in the mail after your visit with us. Thank you!             Your Updated Medication List - Protect others around you: Learn how to safely use, store and throw away your medicines at www.disposemymeds.org.          This list is accurate as of 11/27/18  1:16 PM.  Always use your most recent med list.                   Brand Name Dispense Instructions for use Diagnosis    ferrous sulfate 325 (65 Fe) MG tablet    FEROSUL    30 tablet    Take 1 tablet (325 mg) by mouth daily (with breakfast)    Other iron deficiency anemia       miconazole 2 % cream    MICATIN    15 g    Apply 1 g topically 2 times daily    Tinea corporis       pediatric multivitamin w/iron 27-0.8 MG tablet      Take 1 tablet by mouth daily

## 2018-12-10 ENCOUNTER — PRENATAL OFFICE VISIT (OUTPATIENT)
Dept: OBGYN | Facility: CLINIC | Age: 28
End: 2018-12-10
Payer: COMMERCIAL

## 2018-12-10 VITALS — DIASTOLIC BLOOD PRESSURE: 70 MMHG | WEIGHT: 168 LBS | SYSTOLIC BLOOD PRESSURE: 118 MMHG | BODY MASS INDEX: 28.84 KG/M2

## 2018-12-10 DIAGNOSIS — O09.299 PRIOR PREGNANCY WITH FETAL DEMISE: ICD-10-CM

## 2018-12-10 DIAGNOSIS — Z34.92 ENCOUNTER FOR PREGNANCY RELATED EXAMINATION IN SECOND TRIMESTER: Primary | ICD-10-CM

## 2018-12-10 DIAGNOSIS — O09.299 HX OF PRE-ECLAMPSIA IN PRIOR PREGNANCY, CURRENTLY PREGNANT: ICD-10-CM

## 2018-12-10 LAB
ERYTHROCYTE [DISTWIDTH] IN BLOOD BY AUTOMATED COUNT: 13.5 % (ref 10–15)
HCT VFR BLD AUTO: 31.7 % (ref 35–47)
HGB BLD-MCNC: 10.2 G/DL (ref 11.7–15.7)
MCH RBC QN AUTO: 29.7 PG (ref 26.5–33)
MCHC RBC AUTO-ENTMCNC: 32.2 G/DL (ref 31.5–36.5)
MCV RBC AUTO: 92 FL (ref 78–100)
PLATELET # BLD AUTO: 258 10E9/L (ref 150–450)
RBC # BLD AUTO: 3.44 10E12/L (ref 3.8–5.2)
WBC # BLD AUTO: 9.5 10E9/L (ref 4–11)

## 2018-12-10 PROCEDURE — 36415 COLL VENOUS BLD VENIPUNCTURE: CPT | Performed by: OBSTETRICS & GYNECOLOGY

## 2018-12-10 PROCEDURE — 82950 GLUCOSE TEST: CPT | Performed by: OBSTETRICS & GYNECOLOGY

## 2018-12-10 PROCEDURE — 90471 IMMUNIZATION ADMIN: CPT | Performed by: OBSTETRICS & GYNECOLOGY

## 2018-12-10 PROCEDURE — 90715 TDAP VACCINE 7 YRS/> IM: CPT | Performed by: OBSTETRICS & GYNECOLOGY

## 2018-12-10 PROCEDURE — 86780 TREPONEMA PALLIDUM: CPT | Performed by: OBSTETRICS & GYNECOLOGY

## 2018-12-10 PROCEDURE — 85027 COMPLETE CBC AUTOMATED: CPT | Performed by: OBSTETRICS & GYNECOLOGY

## 2018-12-10 PROCEDURE — 99207 ZZC COMPLICATED OB VISIT: CPT | Performed by: OBSTETRICS & GYNECOLOGY

## 2018-12-10 NOTE — PROGRESS NOTES
28 year old  at 27w2d weeks presents to the clinic for a routine prenatal visit.  Feeling well.  No vaginal bleeding, leakage of fluid, or cramping/contractions. +FM  Denies headaches, vision changes, RUQ abdominal pain, nausea/vomiting, shortness of breath.   She is taking her ASA as instructed.    /70   Wt 76.2 kg (168 lb)   LMP 2018 (Exact Date)   BMI 28.84 kg/m      1. Supervision of high risk pregnancy in second trimester  - A+ / RI , + antibody screen - resolved  - Flu [x]  - Normal anatomical scan with MFM  - Tdap today  - Glucola, syphilis, CBC today    2. H/O pre-eclampsia (severe) in prior pregnancy, currently pregnant, second trimester  - Following with MFM, next visit   - ASA 81mg daily  - Baseline labs obtained, normal  - Normotensive in this pregnancy  - Growth ultrasound q 4 weeks, BPPs starting at 28 weeks   - Signs and symptoms reviewed, discussed when to call in detail using the  - wrote out instructions as well    3. Prior pregnancy with fetal demise (in [place])  - Diagnosed with preeclampsia @ 28 weeks, fetal demise at 30 weeks  - Following with MFM  - Antiphospholipid Syndrome Labs obtained -- all normal   - Lupus Anticoagulant Panel   - Beta 2 Glycoprotein Antibodies IGG IGM   - Cardiolipin Hermila IgG and IgM  - See above    4. GBS bacteriuria  - Treated   - PCN in labor    5. +TB testing in pregnancy 2018 through Immigration Department  - Asymptomatic, follow up Chest Xray negative    Return to clinic every 2 weeks for visit, blood pressure check.   Next is scheduled in 4 weeks --  assisting patient to schedule appointment in 2 weeks from now with me. Follow up with [facility] is next week, 18.    Concha Prather, DO  Obstetrics and Gynecology

## 2018-12-10 NOTE — NURSING NOTE
"Chief Complaint   Patient presents with     Prenatal Care       Initial /70   Wt 76.2 kg (168 lb)   LMP 2018 (Exact Date)   BMI 28.84 kg/m   Estimated body mass index is 28.84 kg/m  as calculated from the following:    Height as of 18: 1.626 m (5' 4\").    Weight as of this encounter: 76.2 kg (168 lb).  BP completed using cuff size: regular    Questioned patient about current smoking habits.  Pt. has never smoked.          The following HM Due: NONE    +fetal movement  -swelling    Sylvia Talavera, CMA           "

## 2018-12-10 NOTE — PATIENT INSTRUCTIONS
Call the clinic ([facility] [phone], [facility] [phone]) right away with any of the following concerns:     1.   Regular, painful contractions every 5 minutes that make it difficult to walk or talk for over approximately one hour's time.     2.   Vaginal bleeding.     3.   Leaking fluid of any amount, or suspicion of ruptured membranes.        Kick counts: if you notice decreased fetal movement, have a cup of juice and lie of your side. Count how many times the baby moves over two hours. If it's <10 kicks in 2 hours, call the office.     Continue your prenatal vitamins daily.     Avoid lying flat on your back for a prolonged period.     Consume 2-3 servings of fish or seafood weekly for fetal brain development. Avoid shark, tilefish, swordfish, and albacore tune as these contain very high levels of mercury. No raw seafood in pregnancy.     Be sure you are consuming 1000mg of calcium and 1000 IU of vitamin D daily.     Unless instructed otherwise, try to fit in 30 minutes of moderate exercise daily.     Please call with any additional concerns that your have, and continue your prenatal visits weekly.     You may complete your hospital pre-registration online at [email or web address].

## 2018-12-11 LAB
GLUCOSE 1H P 50 G GLC PO SERPL-MCNC: 103 MG/DL (ref 60–129)
T PALLIDUM AB SER QL: NONREACTIVE

## 2018-12-21 ENCOUNTER — OFFICE VISIT (OUTPATIENT)
Dept: MATERNAL FETAL MEDICINE | Facility: CLINIC | Age: 28
End: 2018-12-21
Attending: OBSTETRICS & GYNECOLOGY
Payer: COMMERCIAL

## 2018-12-21 ENCOUNTER — HOSPITAL ENCOUNTER (OUTPATIENT)
Dept: ULTRASOUND IMAGING | Facility: CLINIC | Age: 28
Discharge: HOME | End: 2018-12-21
Attending: OBSTETRICS & GYNECOLOGY | Admitting: OBSTETRICS & GYNECOLOGY
Payer: COMMERCIAL

## 2018-12-21 DIAGNOSIS — O09.299 HX OF PRE-ECLAMPSIA IN PRIOR PREGNANCY, CURRENTLY PREGNANT: ICD-10-CM

## 2018-12-21 DIAGNOSIS — O09.299 PRIOR PREGNANCY WITH FETAL DEMISE: ICD-10-CM

## 2018-12-21 DIAGNOSIS — O09.299 PRIOR PREGNANCY WITH FETAL DEMISE: Primary | ICD-10-CM

## 2018-12-21 PROCEDURE — 76816 OB US FOLLOW-UP PER FETUS: CPT

## 2018-12-21 PROCEDURE — 76819 FETAL BIOPHYS PROFIL W/O NST: CPT | Performed by: OBSTETRICS & GYNECOLOGY

## 2018-12-21 NOTE — PROGRESS NOTES
"Please see \"Imaging\" tab under \"Chart Review\" for details of today's US at the [facility].    Alexander Gaspar MD  Maternal-Fetal Medicine    "

## 2018-12-26 ENCOUNTER — ALLIED HEALTH/NURSE VISIT (OUTPATIENT)
Dept: NURSING | Facility: CLINIC | Age: 28
End: 2018-12-26
Payer: COMMERCIAL

## 2018-12-26 VITALS — DIASTOLIC BLOOD PRESSURE: 72 MMHG | SYSTOLIC BLOOD PRESSURE: 122 MMHG

## 2018-12-26 DIAGNOSIS — Z87.59 H/O SEVERE PRE-ECLAMPSIA: Primary | ICD-10-CM

## 2018-12-28 ENCOUNTER — OFFICE VISIT (OUTPATIENT)
Dept: MATERNAL FETAL MEDICINE | Facility: CLINIC | Age: 28
End: 2018-12-28
Attending: OBSTETRICS & GYNECOLOGY
Payer: COMMERCIAL

## 2018-12-28 ENCOUNTER — HOSPITAL ENCOUNTER (OUTPATIENT)
Dept: ULTRASOUND IMAGING | Facility: CLINIC | Age: 28
Discharge: HOME | End: 2018-12-28
Attending: OBSTETRICS & GYNECOLOGY | Admitting: OBSTETRICS & GYNECOLOGY
Payer: COMMERCIAL

## 2018-12-28 DIAGNOSIS — O09.299 PRIOR PREGNANCY WITH FETAL DEMISE: ICD-10-CM

## 2018-12-28 DIAGNOSIS — O09.299 PRIOR PREGNANCY WITH FETAL DEMISE: Primary | ICD-10-CM

## 2018-12-28 DIAGNOSIS — O09.299 HX OF PRE-ECLAMPSIA IN PRIOR PREGNANCY, CURRENTLY PREGNANT: ICD-10-CM

## 2018-12-28 PROCEDURE — 76819 FETAL BIOPHYS PROFIL W/O NST: CPT

## 2018-12-28 NOTE — PROGRESS NOTES
Please see ultrasound report under imaging tab for details on ultrasound performed today.    Kristine Paredes MD  , OB/GYN  Maternal-Fetal Medicine  [email or web address]  [phone] (Academic office)  [phone] (Pager)

## 2018-12-31 ENCOUNTER — PRENATAL OFFICE VISIT (OUTPATIENT)
Dept: OBGYN | Facility: CLINIC | Age: 28
End: 2018-12-31
Payer: COMMERCIAL

## 2018-12-31 VITALS — SYSTOLIC BLOOD PRESSURE: 118 MMHG | DIASTOLIC BLOOD PRESSURE: 68 MMHG | WEIGHT: 173 LBS | BODY MASS INDEX: 29.7 KG/M2

## 2018-12-31 DIAGNOSIS — Z34.93 ENCOUNTER FOR PREGNANCY RELATED EXAMINATION IN THIRD TRIMESTER: Primary | ICD-10-CM

## 2018-12-31 DIAGNOSIS — O09.299 HX OF PRE-ECLAMPSIA IN PRIOR PREGNANCY, CURRENTLY PREGNANT: ICD-10-CM

## 2018-12-31 DIAGNOSIS — O09.299 PRIOR PREGNANCY WITH FETAL DEMISE: ICD-10-CM

## 2018-12-31 PROCEDURE — 99207 ZZC COMPLICATED OB VISIT: CPT | Performed by: OBSTETRICS & GYNECOLOGY

## 2018-12-31 NOTE — NURSING NOTE
"Chief Complaint   Patient presents with     Prenatal Care       Initial /68   Wt 78.5 kg (173 lb)   LMP 2018 (Exact Date)   BMI 29.70 kg/m   Estimated body mass index is 29.7 kg/m  as calculated from the following:    Height as of 18: 1.626 m (5' 4\").    Weight as of this encounter: 78.5 kg (173 lb).  BP completed using cuff size: regular    Questioned patient about current smoking habits.  Pt. has never smoked.          The following HM Due: NONE    +fetal movement  -swelling  Sylvia Talavera, CMA      "

## 2018-12-31 NOTE — PROGRESS NOTES
28 year old  at 30w2d weeks presents to the clinic for a routine prenatal visit.  Feeling well.  No vaginal bleeding, leakage of fluid, or cramping/contractions. +FM  Denies headaches, vision changes, RUQ abdominal pain, nausea/vomiting, shortness of breath.   She is taking her ASA as instructed.     Brought in Immigration Department paperwork today which includes a copy of her negative chest xray.    /68   Wt 78.5 kg (173 lb)   LMP 2018 (Exact Date)   BMI 29.70 kg/m      1. Supervision of high risk pregnancy  - A+ / RI , + antibody screen - resolved  - Flu [x], Tdap [x]  - Normal anatomical scan with MFM    2. H/O pre-eclampsia (severe) in prior pregnancy, currently pregnant  - Following with MFM  - ASA 81mg daily  - Baseline labs obtained, normal  - Normotensive in this pregnancy  - Growth ultrasound q 4 weeks, BPPs starting at 28 weeks   - Signs and symptoms reviewed, discussed when to call in detail using the  - written instructions have been printed    3. Prior pregnancy with fetal demise (in [place])  - Diagnosed with preeclampsia @ 28 weeks, fetal demise at 30 weeks  - Following with MFM  - Antiphospholipid Syndrome Labs obtained -- all normal   - Lupus Anticoagulant Panel   - Beta 2 Glycoprotein Antibodies IGG IGM   - Cardiolipin Hermila IgG and IgM  - See above    4. GBS bacteriuria  - Treated   - PCN in labor    5. +TB testing in pregnancy 2018 through Immigration Department  - Asymptomatic, follow up Chest Xray negative     Return to clinic every 2 weeks for visit, blood pressure check.     Concha Prather, DO  Obstetrics and Gynecology

## 2019-01-04 ENCOUNTER — HOSPITAL ENCOUNTER (OUTPATIENT)
Dept: ULTRASOUND IMAGING | Facility: CLINIC | Age: 29
Discharge: HOME | End: 2019-01-04
Attending: OBSTETRICS & GYNECOLOGY | Admitting: OBSTETRICS & GYNECOLOGY
Payer: COMMERCIAL

## 2019-01-04 ENCOUNTER — OFFICE VISIT (OUTPATIENT)
Dept: MATERNAL FETAL MEDICINE | Facility: CLINIC | Age: 29
End: 2019-01-04
Attending: OBSTETRICS & GYNECOLOGY
Payer: COMMERCIAL

## 2019-01-04 DIAGNOSIS — O09.299 HX OF PRE-ECLAMPSIA IN PRIOR PREGNANCY, CURRENTLY PREGNANT: ICD-10-CM

## 2019-01-04 DIAGNOSIS — O09.299 PRIOR PREGNANCY WITH FETAL DEMISE: Primary | ICD-10-CM

## 2019-01-04 DIAGNOSIS — O09.299 PRIOR PREGNANCY WITH FETAL DEMISE: ICD-10-CM

## 2019-01-04 PROCEDURE — 76819 FETAL BIOPHYS PROFIL W/O NST: CPT

## 2019-01-11 ENCOUNTER — OFFICE VISIT (OUTPATIENT)
Dept: MATERNAL FETAL MEDICINE | Facility: CLINIC | Age: 29
End: 2019-01-11
Attending: OBSTETRICS & GYNECOLOGY
Payer: COMMERCIAL

## 2019-01-11 ENCOUNTER — HOSPITAL ENCOUNTER (OUTPATIENT)
Dept: ULTRASOUND IMAGING | Facility: CLINIC | Age: 29
Discharge: HOME | End: 2019-01-11
Attending: OBSTETRICS & GYNECOLOGY | Admitting: OBSTETRICS & GYNECOLOGY
Payer: COMMERCIAL

## 2019-01-11 DIAGNOSIS — O09.299 PRIOR PREGNANCY WITH FETAL DEMISE: Primary | ICD-10-CM

## 2019-01-11 DIAGNOSIS — O09.299 HX OF PRE-ECLAMPSIA IN PRIOR PREGNANCY, CURRENTLY PREGNANT: ICD-10-CM

## 2019-01-11 DIAGNOSIS — O09.299 PRIOR PREGNANCY WITH FETAL DEMISE: ICD-10-CM

## 2019-01-11 PROCEDURE — 76819 FETAL BIOPHYS PROFIL W/O NST: CPT

## 2019-01-11 NOTE — PROGRESS NOTES
"Please see \"Imaging\" tab under Chart Review for full details.    BPP is 8/8.    Floridalma Murray MD  Maternal Fetal Medicine    "

## 2019-01-14 ENCOUNTER — PRENATAL OFFICE VISIT (OUTPATIENT)
Dept: OBGYN | Facility: CLINIC | Age: 29
End: 2019-01-14
Payer: COMMERCIAL

## 2019-01-14 VITALS — WEIGHT: 177 LBS | BODY MASS INDEX: 30.38 KG/M2 | DIASTOLIC BLOOD PRESSURE: 70 MMHG | SYSTOLIC BLOOD PRESSURE: 104 MMHG

## 2019-01-14 DIAGNOSIS — O09.299 H/O PRE-ECLAMPSIA IN PRIOR PREGNANCY, CURRENTLY PREGNANT: ICD-10-CM

## 2019-01-14 DIAGNOSIS — Z34.93 ENCOUNTER FOR PREGNANCY RELATED EXAMINATION IN THIRD TRIMESTER: Primary | ICD-10-CM

## 2019-01-14 DIAGNOSIS — O09.299 PRIOR PREGNANCY WITH FETAL DEMISE: ICD-10-CM

## 2019-01-14 PROCEDURE — 99207 ZZC COMPLICATED OB VISIT: CPT | Performed by: OBSTETRICS & GYNECOLOGY

## 2019-01-14 RX ORDER — ASPIRIN 81 MG/1
81 TABLET, CHEWABLE ORAL DAILY
COMMUNITY
End: 2019-01-14

## 2019-01-14 RX ORDER — ASPIRIN 81 MG/1
81 TABLET, CHEWABLE ORAL DAILY
Qty: 50 TABLET | Refills: 0 | Status: SHIPPED | OUTPATIENT
Start: 2019-01-14 | End: 2019-02-19

## 2019-01-14 NOTE — NURSING NOTE
"Chief Complaint   Patient presents with     Prenatal Care       Initial /70   Wt 80.3 kg (177 lb)   LMP 2018 (Exact Date)   BMI 30.38 kg/m   Estimated body mass index is 30.38 kg/m  as calculated from the following:    Height as of 18: 1.626 m (5' 4\").    Weight as of this encounter: 80.3 kg (177 lb).  BP completed using cuff size: regular    Questioned patient about current smoking habits.  Pt. has never smoked.          The following HM Due: NONE    +fetal movement  -swelling    Sylvia Talavera, CMA         "

## 2019-01-14 NOTE — PROGRESS NOTES
28 year old  at 32w2d weeks presents to the clinic for a routine prenatal visit.  No vaginal bleeding, leakage of fluid, or cramping/contractions. + active FM.  Denies headaches, vision changes, RUQ abdominal pain, nausea/vomiting, shortness of breath.   She is taking her ASA daily.    Discussed IOL at 39 weeks due to her history. Patient and partner had questions as to why given this pregnancy is going so well. Reasoning explained and they said they will think about it.  Will also obtain recommendation of [facility].      /70   Wt 80.3 kg (177 lb)   LMP 2018 (Exact Date)   BMI 30.38 kg/m      1. Supervision of high risk pregnancy  - A+ / [place] , + antibody screen - resolved  - Flu [x], Tdap [x]  - Normal anatomical scan with MF  - Message sent to [facility] about delivery at 39 weeks    2. H/O pre-eclampsia (severe) in prior pregnancy, currently pregnant  - ASA 81mg daily, discontinue 10 days prior to due date  - Baseline labs normal  - Normotensive in this pregnancy  - Growth ultrasound q 4 weeks, BPPs starting at 28 weeks   - Signs and symptoms reviewed, discussed when to call in detail using the  - written instructions printed again today    3. Prior pregnancy with fetal demise (in [place])  - Diagnosed with preeclampsia @ 28 weeks, fetal demise at 30 weeks  - Antiphospholipid Syndrome Labs obtained -- all normal   - Lupus Anticoagulant Panel   - Beta 2 Glycoprotein Antibodies IGG IGM   - Cardiolipin Hermila IgG and IgM  - See above    4. GBS bacteriuria in pregnancy  - PCN in labor    5. +TB testing in pregnancy 2018 through Immigration Department  - Asymptomatic, follow up Chest Xray negative     Return to clinic every 1-2 weeks for visit, blood pressure check. 1 week follow up offered. Patient desires 2 weeks.     Concha Prather, DO  Obstetrics and Gynecology

## 2019-01-14 NOTE — PATIENT INSTRUCTIONS
Call the clinic ([facility] [phone], [facility] [phone]) right away with any of the following concerns:     1.   Regular, painful contractions every 5 minutes that make it difficult to walk or talk for over approximately one hour's time.     2.   Vaginal bleeding.     3.   Leaking fluid of any amount, or suspicion of ruptured membranes.    4. Headache not resolved with Tylenol 1,000mg orally, vision changes, shortness of breath, vomiting, right upper quadrant pain.        Kick counts: if you notice decreased fetal movement, have a cup of juice and lie of your side. Count how many times the baby moves over two hours. If it's <10 kicks in 2 hours, call the office.     Please call with any additional concerns that your have, and continue your prenatal visits weekly.     Discontinue your aspirin 10 days before your expected delivery.     You may complete your hospital pre-registration online at [email or web address].

## 2019-01-18 ENCOUNTER — HOSPITAL ENCOUNTER (OUTPATIENT)
Dept: ULTRASOUND IMAGING | Facility: CLINIC | Age: 29
Discharge: HOME | End: 2019-01-18
Attending: OBSTETRICS & GYNECOLOGY | Admitting: OBSTETRICS & GYNECOLOGY
Payer: COMMERCIAL

## 2019-01-18 ENCOUNTER — OFFICE VISIT (OUTPATIENT)
Dept: MATERNAL FETAL MEDICINE | Facility: CLINIC | Age: 29
End: 2019-01-18
Attending: OBSTETRICS & GYNECOLOGY
Payer: COMMERCIAL

## 2019-01-18 DIAGNOSIS — O09.299 PRIOR PREGNANCY WITH FETAL DEMISE: ICD-10-CM

## 2019-01-18 DIAGNOSIS — O09.299 HX OF PRE-ECLAMPSIA IN PRIOR PREGNANCY, CURRENTLY PREGNANT: ICD-10-CM

## 2019-01-18 DIAGNOSIS — O09.299 PRIOR PREGNANCY WITH FETAL DEMISE: Primary | ICD-10-CM

## 2019-01-18 PROCEDURE — 76819 FETAL BIOPHYS PROFIL W/O NST: CPT | Performed by: OBSTETRICS & GYNECOLOGY

## 2019-01-18 PROCEDURE — 76816 OB US FOLLOW-UP PER FETUS: CPT

## 2019-01-18 NOTE — PROGRESS NOTES
"Please see \"Imaging\" tab under \"Chart Review\" for details of today's US.    Yun Marques, DO    "

## 2019-01-22 ENCOUNTER — ALLIED HEALTH/NURSE VISIT (OUTPATIENT)
Dept: NURSING | Facility: CLINIC | Age: 29
End: 2019-01-22
Payer: COMMERCIAL

## 2019-01-22 VITALS — DIASTOLIC BLOOD PRESSURE: 68 MMHG | TEMPERATURE: 97.5 F | SYSTOLIC BLOOD PRESSURE: 118 MMHG

## 2019-01-22 DIAGNOSIS — Z87.59 H/O SEVERE PRE-ECLAMPSIA: Primary | ICD-10-CM

## 2019-01-22 PROCEDURE — 99207 ZZC NO CHARGE NURSE ONLY: CPT

## 2019-01-25 ENCOUNTER — HOSPITAL ENCOUNTER (OUTPATIENT)
Dept: ULTRASOUND IMAGING | Facility: CLINIC | Age: 29
End: 2019-01-25
Attending: OBSTETRICS & GYNECOLOGY
Payer: COMMERCIAL

## 2019-01-25 ENCOUNTER — OFFICE VISIT (OUTPATIENT)
Dept: MATERNAL FETAL MEDICINE | Facility: CLINIC | Age: 29
End: 2019-01-25
Attending: OBSTETRICS & GYNECOLOGY
Payer: COMMERCIAL

## 2019-01-25 DIAGNOSIS — O09.293: Primary | ICD-10-CM

## 2019-01-25 DIAGNOSIS — O09.299 PRIOR PREGNANCY WITH FETAL DEMISE: ICD-10-CM

## 2019-01-25 NOTE — PROGRESS NOTES
Please see full imaging report from [employer] program under imaging tab.    BPP 8/8    Shlomo Chisholm MD  Maternal Fetal Medicine

## 2019-01-25 NOTE — NURSING NOTE
See ultrasound report under imaging tab for further details of today's visit.       oNrah Fisher #[id number], KTTS with pt for US today.

## 2019-01-29 ENCOUNTER — PRENATAL OFFICE VISIT (OUTPATIENT)
Dept: OBGYN | Facility: CLINIC | Age: 29
End: 2019-01-29
Payer: COMMERCIAL

## 2019-01-29 VITALS — SYSTOLIC BLOOD PRESSURE: 112 MMHG | WEIGHT: 175 LBS | DIASTOLIC BLOOD PRESSURE: 70 MMHG | BODY MASS INDEX: 30.04 KG/M2

## 2019-01-29 DIAGNOSIS — O09.299 H/O PRE-ECLAMPSIA IN PRIOR PREGNANCY, CURRENTLY PREGNANT: ICD-10-CM

## 2019-01-29 DIAGNOSIS — Z34.93 ENCOUNTER FOR PREGNANCY RELATED EXAMINATION IN THIRD TRIMESTER: Primary | ICD-10-CM

## 2019-01-29 DIAGNOSIS — O09.299 PRIOR PREGNANCY WITH FETAL DEMISE: ICD-10-CM

## 2019-01-29 PROCEDURE — 99207 ZZC PRENATAL VISIT: CPT | Performed by: OBSTETRICS & GYNECOLOGY

## 2019-01-29 NOTE — NURSING NOTE
"Chief Complaint   Patient presents with     Prenatal Care     34 weeks       Initial /70   Wt 79.4 kg (175 lb)   LMP 2018 (Exact Date)   BMI 30.04 kg/m   Estimated body mass index is 30.04 kg/m  as calculated from the following:    Height as of 18: 1.626 m (5' 4\").    Weight as of this encounter: 79.4 kg (175 lb).  BP completed using cuff size: regular    Questioned patient about current smoking habits.  Pt. has never smoked.          The following HM Due: NONE    +fetal movement  -swelling    Sylvia Talavera, CMA           "

## 2019-01-29 NOTE — PROGRESS NOTES
28 year old  at 34w3d weeks presents to the clinic for a prenatal visit.  No vaginal bleeding, leakage of fluid, or contractions. + active FM.  Denies headaches, vision changes, RUQ abdominal pain, nausea/vomiting, shortness of breath.   She is taking her ASA daily.      /70   Wt 79.4 kg (175 lb)   LMP 2018 (Exact Date)   BMI 30.04 kg/m      1. Supervision of high risk pregnancy  - A+ / [place] , + antibody screen - resolved  - Flu [x], Tdap [x]  - Normal anatomical scan with MFM    2. H/O pre-eclampsia (severe) in prior pregnancy, currently pregnant  - ASA 81mg daily, discontinue 10 days prior to due date  - Baseline labs normal  - Normotensive in this pregnancy  - Growth ultrasound q 4 weeks, weekly BPPs (MFM)  - Signs and symptoms reviewed    3. Prior pregnancy with fetal demise (in [place]), Diagnosed with preeclampsia @ 28 weeks, fetal demise at 30 weeks  - Antiphospholipid Syndrome Labs  -- all normal   - Lupus Anticoagulant Panel   - Beta 2 Glycoprotein Antibodies IGG IGM   - Cardiolipin Hermila IgG and IgM  - See above    4. GBS bacteriuria in pregnancy  - PCN in labor    5. +TB testing in pregnancy 2018 through Immigration Department  - Asymptomatic, follow up Chest Xray negative     MFM: If fetal growth,  testing is reassuring and patient has no signs of preeclampsia awaiting spontaneous labor is an option. IOL at [facility] or even later with  testing is reasonable if the patient desires.    Elidia is not interested in IOL at 39 weeks. They would like to wait and see what future growth and  testing shows.    Return to clinic in 2 weeks for visit, blood pressure check. 1 week follow up offered. Patient desires 2 weeks.     Concha Prather, DO  Obstetrics and Gynecology

## 2019-02-08 ENCOUNTER — HOSPITAL ENCOUNTER (OUTPATIENT)
Dept: ULTRASOUND IMAGING | Facility: CLINIC | Age: 29
Discharge: HOME | End: 2019-02-08
Attending: OBSTETRICS & GYNECOLOGY | Admitting: OBSTETRICS & GYNECOLOGY
Payer: COMMERCIAL

## 2019-02-08 ENCOUNTER — OFFICE VISIT (OUTPATIENT)
Dept: MATERNAL FETAL MEDICINE | Facility: CLINIC | Age: 29
End: 2019-02-08
Attending: OBSTETRICS & GYNECOLOGY
Payer: COMMERCIAL

## 2019-02-08 DIAGNOSIS — O09.299 PRIOR PREGNANCY WITH FETAL DEMISE: ICD-10-CM

## 2019-02-08 DIAGNOSIS — O09.293: Primary | ICD-10-CM

## 2019-02-08 PROCEDURE — 76819 FETAL BIOPHYS PROFIL W/O NST: CPT

## 2019-02-08 NOTE — PROGRESS NOTES
"Please see \"Imaging\" tab under \"Chart Review\" for details of today's US.      Magnolia Samaniego, DO  Maternal-Fetal Medicine        "

## 2019-02-08 NOTE — NURSING NOTE
See ultrasound report under imaging tab for further details of today's visit.       Norah Ortega #[id number], KTTS her with pt for her US.

## 2019-02-11 ENCOUNTER — PRENATAL OFFICE VISIT (OUTPATIENT)
Dept: OBGYN | Facility: CLINIC | Age: 29
End: 2019-02-11

## 2019-02-11 VITALS — SYSTOLIC BLOOD PRESSURE: 100 MMHG | BODY MASS INDEX: 30.21 KG/M2 | DIASTOLIC BLOOD PRESSURE: 64 MMHG | WEIGHT: 176 LBS

## 2019-02-11 DIAGNOSIS — Z36.85 ANTENATAL SCREENING FOR STREPTOCOCCUS B: Primary | ICD-10-CM

## 2019-02-11 DIAGNOSIS — Z34.93 ENCOUNTER FOR PREGNANCY RELATED EXAMINATION IN THIRD TRIMESTER: ICD-10-CM

## 2019-02-11 PROCEDURE — 99207 ZZC PRENATAL VISIT: CPT | Performed by: OBSTETRICS & GYNECOLOGY

## 2019-02-11 NOTE — PROGRESS NOTES
28 year old  at 36w2d weeks presents to the clinic for a prenatal visit.  No vaginal bleeding, leakage of fluid, or contractions. + active FM.    Discussed when to call, labor precautions.    /64   Wt 79.8 kg (176 lb)   LMP 2018 (Exact Date)   BMI 30.21 kg/m      1. Supervision of high risk pregnancy  - A+ / [place] , + antibody screen - resolved  - Flu [x], Tdap [x]  - Normal anatomical scan with MFM  - PCN in labor (GBS bacteruria in pregnancy)    2. H/O pre-eclampsia (severe) in prior pregnancy, currently pregnant  - ASA 81mg daily, discontinue 10 days prior to due date  - Baseline labs normal  - Normotensive in this pregnancy  - Growth ultrasound q 4 weeks, weekly BPPs (MFM)  - Signs and symptoms reviewed    3. Prior pregnancy with fetal demise (in [place]), Diagnosed with preeclampsia @ 28 weeks, fetal demise at 30 weeks  - Antiphospholipid Syndrome Labs  -- all normal  - See above    4. +TB testing in pregnancy 2018 through Immigration Department  - Asymptomatic, follow up Chest Xray negative     MFM: If fetal growth,  testing is reassuring and patient has no signs of preeclampsia awaiting spontaneous labor is an option. IOL at [facility] or even later with  testing is reasonable if the patient desires.    Elidia is not interested in IOL at 39 weeks. They would like to wait and see what future growth and  testing shows.    Return to clinic weekly until delivery.     Concha Prather, DO  Obstetrics and Gynecology

## 2019-02-11 NOTE — NURSING NOTE
"Chief Complaint   Patient presents with     Prenatal Care     36 2/7 weeks       Initial /64   Wt 79.8 kg (176 lb)   LMP 2018 (Exact Date)   BMI 30.21 kg/m   Estimated body mass index is 30.21 kg/m  as calculated from the following:    Height as of 18: 1.626 m (5' 4\").    Weight as of this encounter: 79.8 kg (176 lb).  BP completed using cuff size: regular    Questioned patient about current smoking habits.  Pt. has never smoked.          The following HM Due: NONE    +fetal movement  +feet swelling    Sylvia Talavera, CMA           "

## 2019-02-15 ENCOUNTER — HOSPITAL ENCOUNTER (OUTPATIENT)
Dept: ULTRASOUND IMAGING | Facility: CLINIC | Age: 29
Discharge: HOME | End: 2019-02-15
Attending: OBSTETRICS & GYNECOLOGY | Admitting: OBSTETRICS & GYNECOLOGY
Payer: COMMERCIAL

## 2019-02-15 ENCOUNTER — OFFICE VISIT (OUTPATIENT)
Dept: MATERNAL FETAL MEDICINE | Facility: CLINIC | Age: 29
End: 2019-02-15
Attending: OBSTETRICS & GYNECOLOGY
Payer: COMMERCIAL

## 2019-02-15 DIAGNOSIS — O09.299 PRIOR PREGNANCY WITH FETAL DEMISE: ICD-10-CM

## 2019-02-15 DIAGNOSIS — O09.299 PRIOR PREGNANCY WITH FETAL DEMISE: Primary | ICD-10-CM

## 2019-02-15 DIAGNOSIS — O09.299 HX OF PRE-ECLAMPSIA IN PRIOR PREGNANCY, CURRENTLY PREGNANT: ICD-10-CM

## 2019-02-15 PROCEDURE — 76816 OB US FOLLOW-UP PER FETUS: CPT

## 2019-02-15 PROCEDURE — 76819 FETAL BIOPHYS PROFIL W/O NST: CPT | Performed by: OBSTETRICS & GYNECOLOGY

## 2019-02-15 NOTE — NURSING NOTE
See ultrasound report under imaging tab for further details of today's visit.       Norah Fisher, #[id number], KTTS here with pt for US

## 2019-02-15 NOTE — PROGRESS NOTES
"Please see \"Imaging\" tab under \"Chart Review\" for details of today's US at the [facility].    Alexander Gaspar MD  Maternal-Fetal Medicine    "

## 2019-02-19 ENCOUNTER — PRENATAL OFFICE VISIT (OUTPATIENT)
Dept: OBGYN | Facility: CLINIC | Age: 29
End: 2019-02-19

## 2019-02-19 VITALS
HEIGHT: 64 IN | SYSTOLIC BLOOD PRESSURE: 122 MMHG | WEIGHT: 178.4 LBS | BODY MASS INDEX: 30.46 KG/M2 | DIASTOLIC BLOOD PRESSURE: 64 MMHG

## 2019-02-19 DIAGNOSIS — O09.299 H/O PRE-ECLAMPSIA IN PRIOR PREGNANCY, CURRENTLY PREGNANT: ICD-10-CM

## 2019-02-19 PROCEDURE — 99207 ZZC PRENATAL VISIT: CPT | Performed by: FAMILY MEDICINE

## 2019-02-19 RX ORDER — ASPIRIN 81 MG/1
81 TABLET, CHEWABLE ORAL DAILY
Qty: 50 TABLET | Refills: 0 | Status: ON HOLD | OUTPATIENT
Start: 2019-02-19 | End: 2019-03-02

## 2019-02-19 ASSESSMENT — MIFFLIN-ST. JEOR: SCORE: 1519.22

## 2019-02-19 NOTE — PROGRESS NOTES
"CC: Here for routine prenatal visit   29 year old y/o  @ 37w3d with Estimated Date of Delivery: Mar 9, 2019   HPI: Pt is doing well, expresses no concerns at today's visit.        This document serves as a record of the services and decisions personally performed and made by Mercedez Gustafson DO. It was created on her behalf by Rosalva Prieto, a trained medical scribe. The creation of this document is based the provider's statements to the medical scribe.  Scribe Rosalva Prieto 10:37 AM, 2019    /64 (BP Location: Right arm, Patient Position: Sitting, Cuff Size: Adult Regular)   Ht 1.626 m (5' 4\")   Wt 80.9 kg (178 lb 6.4 oz)   LMP 2018 (Exact Date)   BMI 30.62 kg/m    See OB flowsheet  + fetal movement, no contractions, no bleeding, no loss of fluid   Discussed monitoring fetal movement - aware of kick counts, reports good movement today  Declines cervix check at today's visit  Cephalic by last ultrasound 2/15/19      1) concerns: No concerns today  2) Routine care: Declines genetic screening; GC - negative; GTT - normal; Flu & Tdap vaccines given; GBS - positive [bacteruria];   3) Hx of Pre-eclampsia, severe: Fetal demise @ 28w, will follow with MFM   - Advised to take 1 baby aspirin daily until delivery   - Weekly BPP's & Growth US q every 4 weeks   - Declines induction, okay per MFM  4) Return: Weekly      Rx:  - aspirin 81 mg 1 chewable tab po daily        The information in this document, created by the medical scribe for me, accurately reflects the services I personally performed and the decisions made by me. I have reviewed and approved this document for accuracy prior to leaving the patient care area.  10:38 AM, 19    Janay    "

## 2019-02-19 NOTE — PATIENT INSTRUCTIONS
Return weekly   Return to clinic:  every 4 weeks till 30 weeks, then every 2 weeks till 36 weeks, then weekly till delivery      Phone numbers [place]:  Day/ night [phone] ask for ob triage  Emergency:  Call labor and delivery:  [phone]    What should I call about??    Contraction every 5 minutes for 1 hour 1 minute long (511), bleeding, loss of fluid, headache that doesn't resolve with tylenol, and decreased fetal movement     Start kick counts @ 26-28 weeks   Keep track of movement and discover your normal baby movement pattern   guideline is listed below  Please call if you do not feel the baby move!  We will have you come in for fetal heart rate monitoring:   Perception of at least 10 FMs during 12 hours of normal maternal activity   Perception of least 10 FMs over two hours when the mother is at rest and focused on [facility] Address   [address], [place], [place] [address] [phone]    Dr. Mercedez Gustafson, DO    OB/GYN   [facility]

## 2019-02-19 NOTE — NURSING NOTE
"37w3d    Chief Complaint   Patient presents with     Prenatal Care     GBS bacteruria in urine--saw MFM 2/15/19--pt declined cervix check       Initial /64 (BP Location: Right arm, Patient Position: Sitting, Cuff Size: Adult Regular)   Ht 1.626 m (5' 4\")   Wt 80.9 kg (178 lb 6.4 oz)   LMP 2018 (Exact Date)   BMI 30.62 kg/m   Estimated body mass index is 30.62 kg/m  as calculated from the following:    Height as of this encounter: 1.626 m (5' 4\").    Weight as of this encounter: 80.9 kg (178 lb 6.4 oz).  BP completed using cuff size: regular    Questioned patient about current smoking habits.  Pt. has never smoked.          The following HM Due: NONE        "

## 2019-02-22 ENCOUNTER — HOSPITAL ENCOUNTER (OUTPATIENT)
Dept: ULTRASOUND IMAGING | Facility: CLINIC | Age: 29
Discharge: HOME | End: 2019-02-22
Attending: OBSTETRICS & GYNECOLOGY | Admitting: OBSTETRICS & GYNECOLOGY
Payer: COMMERCIAL

## 2019-02-22 ENCOUNTER — OFFICE VISIT (OUTPATIENT)
Dept: MATERNAL FETAL MEDICINE | Facility: CLINIC | Age: 29
End: 2019-02-22
Attending: OBSTETRICS & GYNECOLOGY
Payer: COMMERCIAL

## 2019-02-22 DIAGNOSIS — O09.293: Primary | ICD-10-CM

## 2019-02-22 DIAGNOSIS — O09.299 PRIOR PREGNANCY WITH FETAL DEMISE: ICD-10-CM

## 2019-02-22 DIAGNOSIS — O09.299 HX OF PRE-ECLAMPSIA IN PRIOR PREGNANCY, CURRENTLY PREGNANT: ICD-10-CM

## 2019-02-22 PROCEDURE — 76819 FETAL BIOPHYS PROFIL W/O NST: CPT

## 2019-02-26 ENCOUNTER — HOSPITAL ENCOUNTER (OUTPATIENT)
Facility: CLINIC | Age: 29
Discharge: HOME | End: 2019-02-26
Attending: OBSTETRICS & GYNECOLOGY | Admitting: OBSTETRICS & GYNECOLOGY
Payer: COMMERCIAL

## 2019-02-26 ENCOUNTER — OFFICE VISIT (OUTPATIENT)
Dept: MATERNAL FETAL MEDICINE | Facility: CLINIC | Age: 29
End: 2019-02-26
Attending: OBSTETRICS & GYNECOLOGY
Payer: COMMERCIAL

## 2019-02-26 ENCOUNTER — HOSPITAL ENCOUNTER (OUTPATIENT)
Dept: ULTRASOUND IMAGING | Facility: CLINIC | Age: 29
Discharge: HOME | End: 2019-02-26
Attending: OBSTETRICS & GYNECOLOGY | Admitting: OBSTETRICS & GYNECOLOGY
Payer: COMMERCIAL

## 2019-02-26 VITALS — SYSTOLIC BLOOD PRESSURE: 119 MMHG | TEMPERATURE: 98.7 F | RESPIRATION RATE: 18 BRPM | DIASTOLIC BLOOD PRESSURE: 64 MMHG

## 2019-02-26 DIAGNOSIS — O09.299 HX OF PRE-ECLAMPSIA IN PRIOR PREGNANCY, CURRENTLY PREGNANT: ICD-10-CM

## 2019-02-26 DIAGNOSIS — O09.299 PRIOR PREGNANCY WITH FETAL DEMISE: ICD-10-CM

## 2019-02-26 DIAGNOSIS — Z87.59 HISTORY OF IUFD: Primary | ICD-10-CM

## 2019-02-26 LAB
ALT SERPL W P-5'-P-CCNC: 15 U/L (ref 0–50)
AST SERPL W P-5'-P-CCNC: 19 U/L (ref 0–45)
BUN SERPL-MCNC: 6 MG/DL (ref 7–30)
CREAT SERPL-MCNC: 0.48 MG/DL (ref 0.52–1.04)
CREAT UR-MCNC: 23 MG/DL
ERYTHROCYTE [DISTWIDTH] IN BLOOD BY AUTOMATED COUNT: 17.5 % (ref 10–15)
GFR SERPL CREATININE-BSD FRML MDRD: >90 ML/MIN/{1.73_M2}
HCT VFR BLD AUTO: 35.9 % (ref 35–47)
HGB BLD-MCNC: 11.5 G/DL (ref 11.7–15.7)
MCH RBC QN AUTO: 29.2 PG (ref 26.5–33)
MCHC RBC AUTO-ENTMCNC: 32 G/DL (ref 31.5–36.5)
MCV RBC AUTO: 91 FL (ref 78–100)
PLATELET # BLD AUTO: 213 10E9/L (ref 150–450)
PROT UR-MCNC: <0.05 G/L
PROT/CREAT 24H UR: NORMAL G/G CR (ref 0–0.2)
RBC # BLD AUTO: 3.94 10E12/L (ref 3.8–5.2)
URATE SERPL-MCNC: 4.6 MG/DL (ref 2.6–6)
WBC # BLD AUTO: 8.6 10E9/L (ref 4–11)

## 2019-02-26 PROCEDURE — 76818 FETAL BIOPHYS PROFILE W/NST: CPT

## 2019-02-26 PROCEDURE — 40000068 ZZH STATISTIC EVAL-PTS ADMITTED TO OTHER DEPTS: Mod: ZF

## 2019-02-26 PROCEDURE — 84460 ALANINE AMINO (ALT) (SGPT): CPT | Performed by: OBSTETRICS & GYNECOLOGY

## 2019-02-26 PROCEDURE — 59025 FETAL NON-STRESS TEST: CPT

## 2019-02-26 PROCEDURE — G0463 HOSPITAL OUTPT CLINIC VISIT: HCPCS | Mod: 25

## 2019-02-26 PROCEDURE — 84550 ASSAY OF BLOOD/URIC ACID: CPT | Performed by: OBSTETRICS & GYNECOLOGY

## 2019-02-26 PROCEDURE — 36415 COLL VENOUS BLD VENIPUNCTURE: CPT | Performed by: OBSTETRICS & GYNECOLOGY

## 2019-02-26 PROCEDURE — 59025 FETAL NON-STRESS TEST: CPT | Mod: ZF,59 | Performed by: OBSTETRICS & GYNECOLOGY

## 2019-02-26 PROCEDURE — 84156 ASSAY OF PROTEIN URINE: CPT | Performed by: OBSTETRICS & GYNECOLOGY

## 2019-02-26 PROCEDURE — 84520 ASSAY OF UREA NITROGEN: CPT | Performed by: OBSTETRICS & GYNECOLOGY

## 2019-02-26 PROCEDURE — 82565 ASSAY OF CREATININE: CPT | Performed by: OBSTETRICS & GYNECOLOGY

## 2019-02-26 PROCEDURE — 85027 COMPLETE CBC AUTOMATED: CPT | Performed by: OBSTETRICS & GYNECOLOGY

## 2019-02-26 PROCEDURE — 84450 TRANSFERASE (AST) (SGOT): CPT | Performed by: OBSTETRICS & GYNECOLOGY

## 2019-02-26 RX ORDER — ONDANSETRON 2 MG/ML
4 INJECTION INTRAMUSCULAR; INTRAVENOUS EVERY 6 HOURS PRN
Status: DISCONTINUED | OUTPATIENT
Start: 2019-02-26 | End: 2019-02-26 | Stop reason: HOSPADM

## 2019-02-26 NOTE — PLAN OF CARE
Data: [facility] presented to [facility]: 2019  4:39 PM.  Reason for maternal/fetal assessment is pt sent from [facility] for BPP 6/8 (off for breathing). Pt to have extended monitoring. Patient reports back ache, cramping, HA and visual disturbances today.  Patient is a .  Prenatal record reviewed. Pregnancy has been uncomplicated..  Gestational Age 38w3d. VSS. Fetal movement present. Patient denies leaking of vaginal fluid/rupture of membranes, vaginal bleeding, abdominal pain, pelvic pressure, nausea, vomiting, epigastric or URQ pain, significant edema. Support person is not present. RN spoke with spouse on phone who is coming to hospital now from work.  Action: Verbal consent for EFM. Triage assessment completed. Bill of rights reviewed.  Response: Patient verbalized agreement with plan. Will contact Dr Pierre Murphy Md with update and further orders.

## 2019-02-26 NOTE — NURSING NOTE
NST Performed due to 6/8 BPP.  Dr. Paredes reviewed efm tracing. See NST/BPP Doc Flowsheet tab. Dr. Paredes spoke to Dr. Murphy Pt will go to L&D for further monitoring.

## 2019-02-26 NOTE — PROGRESS NOTES
Please see ultrasound report under imaging tab for details on ultrasound performed today.    Kristine Paredes MD  , OB/GYN  Maternal-Fetal Medicine  [email or web address]  [phone] (Academic office)  [phone] (Pager)

## 2019-02-26 NOTE — PROVIDER NOTIFICATION
02/26/19 1712   Provider Notification   Provider Name/Title Dr. Murphy   Method of Notification Phone   Updated Dr. Murphy on arrival of pt from [facility] for BPP 6/8 (off for breathing). Also updated MD on slightly elevated BP's (see flowsheet), pt complaint of a HA with visual disturbances present today, normal reflexes, 1 beat of clonus on R. Fetus active per pt--FHT's category I tracing with many accelerations. No decelerations seen. No contractions seen or palpated. Pt states she has had a back ache and occasional cramping. MD aware of pt's history of 28w IUFD.    MD orders for 2 hours of fetal and uterine monitoring. MD orders for PIH labs (including urine protein) and serial BP's. Plan to update

## 2019-02-27 ENCOUNTER — ALLIED HEALTH/NURSE VISIT (OUTPATIENT)
Dept: NURSING | Facility: CLINIC | Age: 29
End: 2019-02-27
Payer: COMMERCIAL

## 2019-02-27 ENCOUNTER — HOSPITAL ENCOUNTER (INPATIENT)
Facility: CLINIC | Age: 29
LOS: 3 days | Discharge: HOME HEALTH | End: 2019-03-02
Attending: OBSTETRICS & GYNECOLOGY | Admitting: OBSTETRICS & GYNECOLOGY
Payer: COMMERCIAL

## 2019-02-27 DIAGNOSIS — Z87.59 H/O SEVERE PRE-ECLAMPSIA: Primary | ICD-10-CM

## 2019-02-27 DIAGNOSIS — Z87.59 H/O SEVERE PRE-ECLAMPSIA: ICD-10-CM

## 2019-02-27 DIAGNOSIS — O36.8390 NON-REASSURING ELECTRONIC FETAL MONITORING TRACING: Primary | ICD-10-CM

## 2019-02-27 LAB
ABO + RH BLD: NORMAL
ABO + RH BLD: NORMAL
BLD GP AB SCN SERPL QL: NORMAL
BLD PROD TYP BPU: NORMAL
BLOOD BANK CMNT PATIENT-IMP: NORMAL
HGB BLD-MCNC: 11.1 G/DL (ref 11.7–15.7)
NUM BPU REQUESTED: 2
SPECIMEN EXP DATE BLD: NORMAL

## 2019-02-27 PROCEDURE — 86901 BLOOD TYPING SEROLOGIC RH(D): CPT | Performed by: OBSTETRICS & GYNECOLOGY

## 2019-02-27 PROCEDURE — 86850 RBC ANTIBODY SCREEN: CPT | Performed by: OBSTETRICS & GYNECOLOGY

## 2019-02-27 PROCEDURE — 86922 COMPATIBILITY TEST ANTIGLOB: CPT | Performed by: OBSTETRICS & GYNECOLOGY

## 2019-02-27 PROCEDURE — 3E0P7VZ INTRODUCTION OF HORMONE INTO FEMALE REPRODUCTIVE, VIA NATURAL OR ARTIFICIAL OPENING: ICD-10-PCS | Performed by: OBSTETRICS & GYNECOLOGY

## 2019-02-27 PROCEDURE — 86900 BLOOD TYPING SEROLOGIC ABO: CPT | Performed by: OBSTETRICS & GYNECOLOGY

## 2019-02-27 PROCEDURE — 0064U ANTB TP TOTAL&RPR IA QUAL: CPT | Performed by: OBSTETRICS & GYNECOLOGY

## 2019-02-27 PROCEDURE — 85018 HEMOGLOBIN: CPT | Performed by: OBSTETRICS & GYNECOLOGY

## 2019-02-27 PROCEDURE — 0KQM0ZZ REPAIR PERINEUM MUSCLE, OPEN APPROACH: ICD-10-PCS | Performed by: OBSTETRICS & GYNECOLOGY

## 2019-02-27 PROCEDURE — 10907ZC DRAINAGE OF AMNIOTIC FLUID, THERAPEUTIC FROM PRODUCTS OF CONCEPTION, VIA NATURAL OR ARTIFICIAL OPENING: ICD-10-PCS | Performed by: OBSTETRICS & GYNECOLOGY

## 2019-02-27 PROCEDURE — 99207 ZZC NO CHARGE NURSE ONLY: CPT

## 2019-02-27 PROCEDURE — 3E033VJ INTRODUCTION OF OTHER HORMONE INTO PERIPHERAL VEIN, PERCUTANEOUS APPROACH: ICD-10-PCS | Performed by: OBSTETRICS & GYNECOLOGY

## 2019-02-27 PROCEDURE — 25000132 ZZH RX MED GY IP 250 OP 250 PS 637: Performed by: OBSTETRICS & GYNECOLOGY

## 2019-02-27 PROCEDURE — 12000000 ZZH R&B MED SURG/OB

## 2019-02-27 RX ORDER — NALOXONE HYDROCHLORIDE 0.4 MG/ML
.1-.4 INJECTION, SOLUTION INTRAMUSCULAR; INTRAVENOUS; SUBCUTANEOUS
Status: DISCONTINUED | OUTPATIENT
Start: 2019-02-27 | End: 2019-02-28

## 2019-02-27 RX ORDER — IBUPROFEN 800 MG/1
800 TABLET, FILM COATED ORAL
Status: DISCONTINUED | OUTPATIENT
Start: 2019-02-27 | End: 2019-02-28

## 2019-02-27 RX ORDER — PENICILLIN G POTASSIUM 5000000 [IU]/1
5 INJECTION, POWDER, FOR SOLUTION INTRAMUSCULAR; INTRAVENOUS ONCE
Status: COMPLETED | OUTPATIENT
Start: 2019-02-27 | End: 2019-02-28

## 2019-02-27 RX ORDER — ONDANSETRON 2 MG/ML
4 INJECTION INTRAMUSCULAR; INTRAVENOUS EVERY 6 HOURS PRN
Status: DISCONTINUED | OUTPATIENT
Start: 2019-02-27 | End: 2019-02-28

## 2019-02-27 RX ORDER — SODIUM CHLORIDE, SODIUM LACTATE, POTASSIUM CHLORIDE, CALCIUM CHLORIDE 600; 310; 30; 20 MG/100ML; MG/100ML; MG/100ML; MG/100ML
INJECTION, SOLUTION INTRAVENOUS CONTINUOUS
Status: DISCONTINUED | OUTPATIENT
Start: 2019-02-27 | End: 2019-02-28

## 2019-02-27 RX ORDER — LIDOCAINE 40 MG/G
CREAM TOPICAL
Status: DISCONTINUED | OUTPATIENT
Start: 2019-02-27 | End: 2019-02-28

## 2019-02-27 RX ORDER — ZOLPIDEM TARTRATE 5 MG/1
10 TABLET ORAL
Status: DISCONTINUED | OUTPATIENT
Start: 2019-02-27 | End: 2019-02-28

## 2019-02-27 RX ORDER — FENTANYL CITRATE 50 UG/ML
50-100 INJECTION, SOLUTION INTRAMUSCULAR; INTRAVENOUS
Status: DISCONTINUED | OUTPATIENT
Start: 2019-02-27 | End: 2019-02-28

## 2019-02-27 RX ORDER — METHYLERGONOVINE MALEATE 0.2 MG/ML
200 INJECTION INTRAVENOUS
Status: DISCONTINUED | OUTPATIENT
Start: 2019-02-27 | End: 2019-02-28

## 2019-02-27 RX ORDER — ACETAMINOPHEN 325 MG/1
650 TABLET ORAL EVERY 4 HOURS PRN
Status: DISCONTINUED | OUTPATIENT
Start: 2019-02-27 | End: 2019-02-28

## 2019-02-27 RX ORDER — OXYTOCIN 10 [USP'U]/ML
10 INJECTION, SOLUTION INTRAMUSCULAR; INTRAVENOUS
Status: DISCONTINUED | OUTPATIENT
Start: 2019-02-27 | End: 2019-02-28

## 2019-02-27 RX ORDER — CARBOPROST TROMETHAMINE 250 UG/ML
250 INJECTION, SOLUTION INTRAMUSCULAR
Status: DISCONTINUED | OUTPATIENT
Start: 2019-02-27 | End: 2019-02-28

## 2019-02-27 RX ORDER — OXYTOCIN/0.9 % SODIUM CHLORIDE 30/500 ML
100-340 PLASTIC BAG, INJECTION (ML) INTRAVENOUS CONTINUOUS PRN
Status: COMPLETED | OUTPATIENT
Start: 2019-02-27 | End: 2019-02-28

## 2019-02-27 RX ORDER — OXYCODONE AND ACETAMINOPHEN 5; 325 MG/1; MG/1
1 TABLET ORAL
Status: DISCONTINUED | OUTPATIENT
Start: 2019-02-27 | End: 2019-02-28

## 2019-02-27 RX ADMIN — DINOPROSTONE 10 MG: 10 INSERT VAGINAL at 18:22

## 2019-02-27 ASSESSMENT — MIFFLIN-ST. JEOR: SCORE: 1517.4

## 2019-02-27 NOTE — H&P
OB Admit History & Physical  2019    Elidia Barnes  [id number]    History of Present Illness:   Elidia Barnes  Is a 29 year old female who is  being seen for non reassuring fetal status (BPP 6/10) with a Hx of IUFD at 28 weeks.  [facility] had advised induction of labor yesterday.      BIOPHYSICAL PROFILE 19  ---------------------------------------------------------------------------------------------------------  0: Fetal breathing movements  2: Gross body movements  2: Fetal tone  2: Amniotic fluid volume  NST: non-reactive  6/10 Biophysical profile score    NON STRESS TEST 19  ---------------------------------------------------------------------------------------------------------  NST interpretation: non-reactive. Test duration 53 min. Baseline  bpm. Baseline variability: moderate. Accelerations: absent. Decelerations: present. Uterine  activity: present        RECOMMENDATION  ---------------------------------------------------------------------------------------------------------  Thank-you for referring your patient for  testing due to history of fetal demise in the setting of preeclampsia.     The patient was sent to Labor and Delivery. Given this  testing at term, especially in a patient with a prior fetal demise, I would proceed with induction of labor. The  results of this ultrasound and recommendation were discussed with Dr. Murphy.          She was monitored on L&D yesterday  after a BPP in [facility] was  with an NST showing decels.  Prolonged monitoring on L&D was reassuring and patient expressed resistance to induction.  PIH labs were all normal.    A follow up BPP was performed today.   with points off for breathing.  NST in office was NR and induction was again advised.  She now consents.      Her Estimated Date of Delivery is Mar 9, 2019, and gestational age is 38w4d.  Her last menstrual period was Patient's last menstrual period was  "2018 (exact date)..     OB History:   Estimated body mass index is 30.55 kg/m  as calculated from the following:    Height as of this encounter: 1.626 m (5' 4\").    Weight as of this encounter: 80.7 kg (178 lb).  Obstetric History       T0      L0     SAB0   TAB0   Ectopic0   Multiple0   Live Births0       # Outcome Date GA Lbr Shayne/2nd Weight Sex Delivery Anes PTL Lv   2 Current            1   28w0d   M SAB None  FD      Complications: Preeclampsia/Hypertension          Her prenatal course has been complicated by a Hx of PIH and IUFD.  [facility] was in [facility] at the time ().  Induced vaginal delivery.  IUFD diagnosed approx 2 weeks after antihypertensives were prescribed    Estimated fetal weight =  3300gm    Past Medical History:   Past Medical History:   Diagnosis Date     NO ACTIVE PROBLEMS      PIH (pregnancy induced hypertension)      Past Surgical History:   Procedure Laterality Date     NO HISTORY OF SURGERY         Medications:   No current outpatient medications on file.       Allergies:   Blood transfusion related (informational only) and No known allergies          Physical Exam:  Vitals:  Temperature 97.4  F (36.3  C), temperature source Oral, resp. rate 18, height 1.626 m (5' 4\"), weight 80.7 kg (178 lb), last menstrual period 2018, not currently breastfeeding.   FHT rate at 140 bpm  No accels or decels ,with no contractions   GEN: Alert Awake in NAD  Abdomen gravid, NT  Cervix and Dilation:  1.5/50/-4  Ext NT w/o edema      Labs:      Results for FALLON TOMAS (MRN [id number]) as of 2019 17:43   Ref. Range 2019 17:29   Urea Nitrogen Latest Ref Range: 7 - 30 mg/dL 6 (L)   Creatinine Latest Ref Range: 0.52 - 1.04 mg/dL 0.48 (L)   GFR Estimate Latest Ref Range: >60 mL/min/1.73_m2 >90   GFR Estimate If Black Latest Ref Range: >60 mL/min/1.73_m2 >90   ALT Latest Ref Range: 0 - 50 U/L 15   AST Latest Ref Range: 0 - 45 U/L 19   Uric Acid Latest " Ref Range: 2.6 - 6.0 mg/dL 4.6   WBC Latest Ref Range: 4.0 - 11.0 10e9/L 8.6   Hemoglobin Latest Ref Range: 11.7 - 15.7 g/dL 11.5 (L)   Hematocrit Latest Ref Range: 35.0 - 47.0 % 35.9   Platelet Count Latest Ref Range: 150 - 450 10e9/L 213   RBC Count Latest Ref Range: 3.8 - 5.2 10e12/L 3.94   MCV Latest Ref Range: 78 - 100 fl 91   MCH Latest Ref Range: 26.5 - 33.0 pg 29.2   MCHC Latest Ref Range: 31.5 - 36.5 g/dL 32.0   RDW Latest Ref Range: 10.0 - 15.0 % 17.5 (H)       Assessment and Plan:   Intrauterine pregnancy at 38w4d  complicated by Hx of PIH and IUFD at 28 weeks who presents with non reassuring  testing.  MFM had advised induction yesterday - patient now consents    No evidence pre eclampsia at present    1. Admit to L&D  2. Cervidil cervical ripening  3. AROM/Pitocin when cervix more favorable  4. Monitor progress        Pedrito Ingram   Dept of OB/GYN  2019

## 2019-02-27 NOTE — DISCHARGE INSTRUCTIONS
Discharge Instruction for Undelivered Patients      You were seen for: Fetal Assessment and blood pressure monitoring  We Consulted: Dr. Murphy  You had (Test or Medicine): Electronic uterine and fetal monitoring (fetal non-stress test); BP monitoring, lab work, urine protein test     Diet:   Drink 8 to 12 glasses of liquids (milk, juice, water) every day.  You may eat meals and snacks.     Activity:  Call your doctor or nurse midwife if your baby is moving less than usual.     Call your provider if you notice:  Swelling in your face or increased swelling in your hands or legs.  Headaches that are not relieved by Tylenol (acetaminophen).  Changes in your vision (blurring: seeing spots or stars.)  Nausea (sick to your stomach) and vomiting (throwing up).   Weight gain of 5 pounds or more per week.  Heartburn that doesn't go away.  Signs of bladder infection: pain when you urinate (use the toilet), need to go more often and more urgently.  The bag of callaway (rupture of membranes) breaks, or you notice leaking in your underwear.  Bright red blood in your underwear.  Abdominal (lower belly) or stomach pain.  For first baby: Contractions (tightening) less than 5 minutes apart for one hour or more.  Second (plus) baby: Contractions (tightening) less than 10 minutes apart and getting stronger.  *If less than 34 weeks: Contractions (tightenings) more than 6 times in one hour.  Increase or change in vaginal discharge (note the color and amount)      Follow-up:  As scheduled in the clinic. Call your OB clinic tomorrow to ask if you should be seen prior to your appointment next week.

## 2019-02-27 NOTE — PROVIDER NOTIFICATION
Data: Patient presented to [facility]: 2019  4:43 PM.  Patient admitted for induction for unreassuring FHT, 6/10 BPP - recommended by MFDEJUAN, history of fetal demise. Patient is a .  Prenatal record reviewed. Pregnancy has been uncomplicated. Live  at bedside.  Gestational Age 38w4d. VSS. Fetal movement present. Patient denies uterine contractions, leaking of vaginal fluid/rupture of membranes, vaginal bleeding, pelvic pressure, nausea, vomiting, headache, visual disturbances, epigastric or URQ pain, significant edema. Support person is not present.   Action: Verbal consent for EFM.  Admission assessment completed. Bill of rights reviewed.  Response: Patient verbalized agreement with plan. Will contact Dr Pedrito Ingram with update and further orders.

## 2019-02-27 NOTE — PROVIDER NOTIFICATION
02/27/19 1724   Provider Notification   Provider Name/Title Dr Ingram   Method of Notification Phone   Notification Reason SVE   Updated MD of SVE 1.5/50/-3, soft and anterior. MD gave TO for cervical ripening with cervadil, pt may have regular diet, pt may have ambien 10mg tab for sleep prn.

## 2019-02-28 PROBLEM — O09.293 HISTORY OF PRE-ECLAMPSIA IN PRIOR PREGNANCY, CURRENTLY PREGNANT IN THIRD TRIMESTER: Status: RESOLVED | Noted: 2019-02-28 | Resolved: 2019-02-28

## 2019-02-28 PROBLEM — O09.293 HISTORY OF PRE-ECLAMPSIA IN PRIOR PREGNANCY, CURRENTLY PREGNANT IN THIRD TRIMESTER: Status: ACTIVE | Noted: 2019-02-28

## 2019-02-28 PROBLEM — O09.299 PRIOR PREGNANCY WITH FETAL DEMISE: Status: RESOLVED | Noted: 2018-10-10 | Resolved: 2019-02-28

## 2019-02-28 PROBLEM — R82.71 GROUP B STREPTOCOCCAL BACTERIURIA: Status: RESOLVED | Noted: 2018-07-31 | Resolved: 2019-02-28

## 2019-02-28 PROBLEM — R82.71 GROUP B STREPTOCOCCAL BACTERIURIA: Status: ACTIVE | Noted: 2018-07-31

## 2019-02-28 LAB — T PALLIDUM AB SER QL: NONREACTIVE

## 2019-02-28 PROCEDURE — 25800030 ZZH RX IP 258 OP 636: Performed by: OBSTETRICS & GYNECOLOGY

## 2019-02-28 PROCEDURE — 25000125 ZZHC RX 250: Performed by: OBSTETRICS & GYNECOLOGY

## 2019-02-28 PROCEDURE — 59400 OBSTETRICAL CARE: CPT | Performed by: OBSTETRICS & GYNECOLOGY

## 2019-02-28 PROCEDURE — 25000128 H RX IP 250 OP 636: Performed by: OBSTETRICS & GYNECOLOGY

## 2019-02-28 PROCEDURE — 72200001 ZZH LABOR CARE VAGINAL DELIVERY SINGLE

## 2019-02-28 PROCEDURE — 12000000 ZZH R&B MED SURG/OB

## 2019-02-28 PROCEDURE — 25000132 ZZH RX MED GY IP 250 OP 250 PS 637: Performed by: OBSTETRICS & GYNECOLOGY

## 2019-02-28 RX ORDER — BISACODYL 10 MG
10 SUPPOSITORY, RECTAL RECTAL DAILY PRN
Status: DISCONTINUED | OUTPATIENT
Start: 2019-03-02 | End: 2019-03-02 | Stop reason: HOSPADM

## 2019-02-28 RX ORDER — ACETAMINOPHEN 325 MG/1
650 TABLET ORAL EVERY 4 HOURS PRN
Status: DISCONTINUED | OUTPATIENT
Start: 2019-02-28 | End: 2019-03-02 | Stop reason: HOSPADM

## 2019-02-28 RX ORDER — HYDROCORTISONE 2.5 %
CREAM (GRAM) TOPICAL 3 TIMES DAILY PRN
Status: DISCONTINUED | OUTPATIENT
Start: 2019-02-28 | End: 2019-03-02 | Stop reason: HOSPADM

## 2019-02-28 RX ORDER — OXYTOCIN/0.9 % SODIUM CHLORIDE 30/500 ML
100 PLASTIC BAG, INJECTION (ML) INTRAVENOUS CONTINUOUS
Status: DISCONTINUED | OUTPATIENT
Start: 2019-02-28 | End: 2019-03-02 | Stop reason: HOSPADM

## 2019-02-28 RX ORDER — IBUPROFEN 600 MG/1
600 TABLET, FILM COATED ORAL EVERY 6 HOURS PRN
Status: DISCONTINUED | OUTPATIENT
Start: 2019-02-28 | End: 2019-03-02 | Stop reason: HOSPADM

## 2019-02-28 RX ORDER — OXYTOCIN/0.9 % SODIUM CHLORIDE 30/500 ML
340 PLASTIC BAG, INJECTION (ML) INTRAVENOUS CONTINUOUS PRN
Status: DISCONTINUED | OUTPATIENT
Start: 2019-02-28 | End: 2019-03-02 | Stop reason: HOSPADM

## 2019-02-28 RX ORDER — AMOXICILLIN 250 MG
1 CAPSULE ORAL 2 TIMES DAILY
Status: DISCONTINUED | OUTPATIENT
Start: 2019-02-28 | End: 2019-03-02 | Stop reason: HOSPADM

## 2019-02-28 RX ORDER — LANOLIN 100 %
OINTMENT (GRAM) TOPICAL
Status: DISCONTINUED | OUTPATIENT
Start: 2019-02-28 | End: 2019-03-02 | Stop reason: HOSPADM

## 2019-02-28 RX ORDER — MISOPROSTOL 200 UG/1
800 TABLET ORAL
Status: DISCONTINUED | OUTPATIENT
Start: 2019-02-28 | End: 2019-03-02 | Stop reason: HOSPADM

## 2019-02-28 RX ORDER — TERBUTALINE SULFATE 1 MG/ML
0.25 INJECTION, SOLUTION SUBCUTANEOUS
Status: DISCONTINUED | OUTPATIENT
Start: 2019-02-28 | End: 2019-02-28

## 2019-02-28 RX ORDER — AMOXICILLIN 250 MG
2 CAPSULE ORAL 2 TIMES DAILY
Status: DISCONTINUED | OUTPATIENT
Start: 2019-02-28 | End: 2019-03-02 | Stop reason: HOSPADM

## 2019-02-28 RX ORDER — OXYTOCIN 10 [USP'U]/ML
10 INJECTION, SOLUTION INTRAMUSCULAR; INTRAVENOUS
Status: DISCONTINUED | OUTPATIENT
Start: 2019-02-28 | End: 2019-03-02 | Stop reason: HOSPADM

## 2019-02-28 RX ORDER — LIDOCAINE 40 MG/G
CREAM TOPICAL
Status: DISCONTINUED | OUTPATIENT
Start: 2019-02-28 | End: 2019-02-28

## 2019-02-28 RX ORDER — NALOXONE HYDROCHLORIDE 0.4 MG/ML
.1-.4 INJECTION, SOLUTION INTRAMUSCULAR; INTRAVENOUS; SUBCUTANEOUS
Status: DISCONTINUED | OUTPATIENT
Start: 2019-02-28 | End: 2019-03-02 | Stop reason: HOSPADM

## 2019-02-28 RX ORDER — OXYTOCIN/0.9 % SODIUM CHLORIDE 30/500 ML
1-24 PLASTIC BAG, INJECTION (ML) INTRAVENOUS CONTINUOUS
Status: DISCONTINUED | OUTPATIENT
Start: 2019-02-28 | End: 2019-02-28

## 2019-02-28 RX ADMIN — OXYTOCIN-SODIUM CHLORIDE 0.9% IV SOLN 30 UNIT/500ML 340 ML/HR: 30-0.9/5 SOLUTION at 17:18

## 2019-02-28 RX ADMIN — SODIUM CHLORIDE, POTASSIUM CHLORIDE, SODIUM LACTATE AND CALCIUM CHLORIDE: 600; 310; 30; 20 INJECTION, SOLUTION INTRAVENOUS at 08:46

## 2019-02-28 RX ADMIN — PENICILLIN G POTASSIUM 5 MILLION UNITS: 5000000 POWDER, FOR SOLUTION INTRAMUSCULAR; INTRAPLEURAL; INTRATHECAL; INTRAVENOUS at 08:52

## 2019-02-28 RX ADMIN — SENNOSIDES AND DOCUSATE SODIUM 1 TABLET: 8.6; 5 TABLET ORAL at 21:09

## 2019-02-28 RX ADMIN — IBUPROFEN 600 MG: 600 TABLET ORAL at 17:55

## 2019-02-28 RX ADMIN — SODIUM CHLORIDE 2.5 MILLION UNITS: 9 INJECTION, SOLUTION INTRAVENOUS at 13:09

## 2019-02-28 RX ADMIN — OXYTOCIN-SODIUM CHLORIDE 0.9% IV SOLN 30 UNIT/500ML 1 MILLI-UNITS/MIN: 30-0.9/5 SOLUTION at 12:04

## 2019-02-28 RX ADMIN — LIDOCAINE HYDROCHLORIDE 30 ML: 10 INJECTION, SOLUTION EPIDURAL; INFILTRATION; INTRACAUDAL; PERINEURAL at 17:24

## 2019-02-28 NOTE — PROGRESS NOTES
[facility] L&[facility] Labor Note    Elidia Barnes MRN# [id number]   Age: 29 year old YOB: 1990           Subjective:     I communicated with Pt via [place]  because Pt speaks no/limited English.    Pt having some UC's that cause mild pain, no VB or SROM.            Objective:     Patient Vitals for the past 8 hrs:   BP Temp Temp src Resp   19 0822 116/66 97.9  F (36.6  C) Oral 16   19 0230 120/73 98.2  F (36.8  C) Oral 16        Cervical Exam: 3/80%/-2/Vtx per RN exam at 6:40AM (Cervidil came out at that time)    Membranes: Intact    Fetal Heart Rate: 135, Cat 1    [place]:  UC's mild and Q 2-4 min          Assessment:   1)  IUP at 38w5d    2)  Indxn for non-reassuring fetal surveillance - FHTs currently Cat 1; currently in latent phase labor after Cx ripening    3)  Hx Severe pre-eclampsia - BPs stable and labs WNL    4)  GBS Pos          Plan:   1)  Start PCN for GBS prophylaxis    2)  AROM when 1st dose of PCN in.    3)  Epidural prn    4)  Augment w/ Pitocin prn    5)  Antic       Eder Elise MD

## 2019-02-28 NOTE — PROVIDER NOTIFICATION
02/28/19 1140   Provider Notification   Provider Name/Title Dr. Elise   Method of Notification At Bedside   Request Evaluate in Person   Notification Reason SVE    Dr. Elise at the bedside, AROM performed with clear amniotic fluid noted, orders received to also start IV Pitocin Augmentation. Patient verbalized understanding and processed with plan.

## 2019-02-28 NOTE — L&D DELIVERY NOTE
OB Vaginal Delivery Note    Elidia Barnes MRN# [id number]   Age: 29 year old YOB: 1990       GA: 38w5d  GP:   Labor Complications: GBS   EBL:    mL  QBL: 533 mL  Delivery Type: Vaginal, Spontaneous   ROM to Delivery Time: 5h 39m  [place] Weight:      1 Minute 5 Minute 10 Minute   Apgar Totals: 8    9                Delivery Details:  Elidia Barnes, a 29 year old  female delivered a viable infant with apgars of 8   and 9  . Patient was fully dilated and pushing after 5  hours 2  minutes in active labor. Delivery was via vaginal, spontaneous  to a sterile field under local  anesthesia. Infant delivered in vertex  middle  occiput  anterior  position. Anterior and posterior shoulders delivered without difficulty. The cord was clamped, cut twice and 3 vessels  were noted. Cord blood was obtained in routine fashion with the following disposition: lab .      Cord complications: none   Placenta delivered at 2019  5:19 PM . Placental disposition was Hospital disposal . Fundal massage performed and fundus found to be firm.     Episiotomy: none    Perineum, vagina, cervix were inspected, and the following lacerations were noted:   Perineal lacerations: 2nd                     Any lacerations were repaired in the usual fashion using 3-O Vicryl.    Excellent hemostasis was noted. Needle count correct. Infant and patient in delivery room in good and stable condition.        Labor Event Times    Labor onset date:  19 Onset time:  12:00 PM   Dilation complete date:  19 Complete time:   5:02 PM   Start pushing date/time:  2019 1705      Labor Length    1st Stage (hrs):  5 (min):  2   2nd Stage (hrs):  0 (min):  11   3rd Stage (hrs):  0 (min):  5      Labor Events     labor?:  No   steroids:  None  Labor Type:  AROM, Induction  Predominate monitoring during 1st stage:  continuous electronic fetal monitoring     Antibiotics received during labor?:  Yes  Reason  for Antibiotics:  GBS  Antibiotics received for GBS:  Penicillin  Antibiotics Given (GBS):  Greater than 4 hours prior to delivery     Rupture date/time: 19 1135   Rupture type:  Artificial Rupture of Membranes  Fluid color:  Clear  Fluid odor:  Normal     Induction:  Cervidil, Oxytocin  Induction date/time:     Cervical ripening date/time:     Indications for induction:  Fetal Heart Rate or Rhythm Abnormality     Delivery/Placenta Date and Time    Delivery Date:  19 Delivery Time:   5:14 PM   Placenta Date/Time:  2019  5:19 PM  Oxytocin given at the time of delivery:  after delivery of baby     Vaginal Counts     Initial count performed by 2 team members:   Two Team Members   TATYANA Loya Dr       Needles Suture [place] Sponges Instruments   Initial counts 2  5    Added to count  2     Final counts 2 2 5    Placed during labor Accounted for at the end of labor   NA    NA    NA     Final count performed by 2 team members:   Two Team Members   Dr Arik Weber RN      Final count correct?:  Yes     Apgars    Living status:  Living   1 Minute 5 Minute 10 Minute 15 Minute 20 Minute   Skin color: 0  1       Heart rate: 2  2       Reflex irritability: 2  2       Muscle tone: 2  2       Respiratory effort: 2  2       Total: 8  9       Apgars assigned by:  FARTUN MCADAMS RN     Cord    Vessels:  3 Vessels Complications:  None   Cord Blood Disposition:  Lab Gases Sent?:  No       Resuscitation    Methods:  None     Labor Events and Shoulder Dystocia    Fetal Tracing Prior to Delivery:  Category 2  Fetal Tracing Comments:  Mild variable decelerations in second stage  Shoulder dystocia present?:  Neg     Delivery (Maternal) (Provider to Complete) ([id number])    Episiotomy:  None  Perineal lacerations:  2nd Repaired?:  Yes   Vaginal laceration?:  No    Cervical laceration?:  No       Blood Loss  Mother: Elidia Barnes #[id number]   Start of Mother's Information    IO Blood Loss   02/28/19 1200 - 02/28/19 1743    Total QBL Blood Loss (mL) Hospital Encounter 533 mL    Total  533 mL         End of Mother's Information  Mother: Elidia Barnes #[id number]         Delivery - Provider to Complete ([id number])    Delivering clinician:  Eder Elise MD  Attempted Delivery Types (Choose all that apply):  Spontaneous Vaginal Delivery  Delivery Type (Choose the 1 that will go to the Birth History):  Vaginal, Spontaneous          Placenta    Delayed Cord Clamping:  Done  Date/Time:  2/28/2019  5:19 PM  Removal:  Spontaneous  Disposition:  Hospital disposal     Anesthesia    Method:  Local   Analgesic:   BIRTH HISTORY: ANALGESIC   LIDOCAINE 1 % INJECTION         Presentation and Position    Presentation:  Vertex  Position:  Middle Occiput Anterior           Eder Elise MD

## 2019-02-28 NOTE — PROVIDER NOTIFICATION
02/28/19 0830   Provider Notification   Provider Name/Title Dr. Elise   Method of Notification At Bedside   Request Evaluate in Person   Notification Reason Status Update     Dr. Elise here at the bedside to discuss plan of care with patient and . Will start PCN for GBS status and plan to perform AROM once PCN is completed. Also informed patient possibility of Pitocin if active labor is not established after AROM. Patient  present in room and reviewed information with patient, verbalized understanding plan of care.

## 2019-02-28 NOTE — PROVIDER NOTIFICATION
02/27/19 1929   Provider Notification   Provider Name/Title Dr Ingram   Method of Notification At Bedside   Checking on pt, pt denies feeling contractions at this time.

## 2019-02-28 NOTE — PROVIDER NOTIFICATION
02/28/19 1617   Provider Notification   Provider Name/Title Dr. Elise   Method of Notification At Bedside   Request Evaluate in Person   Notification Reason SVE;Status Update     Dr. Elise here at the bedside, SVE per MD. Patient working well with contractions and able to relax between contractions. One episode of vomiting while up in bathroom to void. Declines medication option at this time. Patient back to rocking chair, spouse very supportive

## 2019-02-28 NOTE — PLAN OF CARE
Patient working well with contractions and discussed option of nitrous oxide gas for pain management. Patient and  reviewing information and will discuss, will notify me if this option will be used.

## 2019-03-01 PROCEDURE — 12000000 ZZH R&B MED SURG/OB

## 2019-03-01 PROCEDURE — 25000132 ZZH RX MED GY IP 250 OP 250 PS 637: Performed by: OBSTETRICS & GYNECOLOGY

## 2019-03-01 RX ADMIN — SENNOSIDES AND DOCUSATE SODIUM 1 TABLET: 8.6; 5 TABLET ORAL at 10:55

## 2019-03-01 RX ADMIN — IBUPROFEN 600 MG: 600 TABLET ORAL at 11:22

## 2019-03-01 RX ADMIN — IBUPROFEN 600 MG: 600 TABLET ORAL at 17:10

## 2019-03-01 RX ADMIN — SENNOSIDES AND DOCUSATE SODIUM 1 TABLET: 8.6; 5 TABLET ORAL at 20:29

## 2019-03-01 RX ADMIN — IBUPROFEN 600 MG: 600 TABLET ORAL at 04:44

## 2019-03-01 NOTE — PLAN OF CARE
To room 449 from L&D per wheelchair at 2000. Oriented to room and surroundings. Has voided prior to transfer, denies pain at this time. Baby to breast, fair breast feeding. FOB present and supportive, then had to leave.

## 2019-03-01 NOTE — PLAN OF CARE
Data: Elidia Barnes transferred to [facility] via wheelchair at 2005. Baby transferred via parent's arms.  Action: Receiving unit notified of transfer: Yes. Patient and family notified of room change. Report given to Any AVILA at 2005. Belongings sent to receiving unit. Accompanied by Registered Nurse. Oriented patient to surroundings. Call light within reach. ID bands double-checked with receiving RN.  Response: Patient tolerated transfer and is stable.

## 2019-03-01 NOTE — PLAN OF CARE
Ind with self cares, needing assistance with breastfeeding, pt states her goal is to exclusively breastfeed and RN encouraged this. Voiding and ambulating without difficulty. Managing pain with ibuprofen per emar, knows tylenol is available as well. PP checks WDL. Bonding well with baby.

## 2019-03-01 NOTE — PROGRESS NOTES
Public Health Nurse (PHN) met with patient, discussed resources within [facility].  Provided family resources of [facility] Public Health services resource card, home visiting card, community resource guide and car seat information card given and discussed.  Offered referral for home visiting, family declined. Patient declined any questions or concerns.

## 2019-03-01 NOTE — PROGRESS NOTES
[facility] Obstetrics Post-Partum Progress Note          Assessment and Plan:    Assessment:   Post-partum day #1  Normal spontaneous vaginal delivery  L&D complications: None      Doing well.  Clean wound without signs of infection.  Normal healing wound.  No immediate surgical complications identified.  No excessive bleeding  Pain well-controlled.      Plan:   Ambulation encouraged  Breast feeding strategies discussed  Monitor wound for signs of infection  Pain control measures as needed  Reportable signs and symptoms dicussed with the patient  Anticipate discharge tomorrow           Interval History:   Doing well.  Pain is well-controlled.  No fevers.  No history of foul-smelling vaginal discharge.  Good appetite.  Denies chest pain, shortness of breath, nausea or vomiting.  Vaginal bleeding is similar to a heavy menstrual flow.  Ambulatory.  Breastfeeding well.          Significant Problems:      Past Medical History:   Diagnosis Date     NO ACTIVE PROBLEMS      PIH (pregnancy induced hypertension)      Vaginal delivery 2/28/2019             Review of Systems:    The patient denies any chest pain, shortness of breath, excessive pain, fever, chills, purulent drainage from the wound, nausea or vomiting.          Medications:   All medications related to the patient's surgery have been reviewed          Physical Exam:   Vitals were reviewed  All vitals stable  Temp: 97.9  F (36.6  C) Temp src: Oral BP: 123/76 Pulse: 90   Resp: 18        Uterine fundus is firm, non-tender and at the level of the umbilicus          Data:     All laboratory data related to this surgery reviewed  Hemoglobin   Date Value Ref Range Status   02/27/2019 11.1 (L) 11.7 - 15.7 g/dL Final   02/26/2019 11.5 (L) 11.7 - 15.7 g/dL Final   12/10/2018 10.2 (L) 11.7 - 15.7 g/dL Final   10/19/2018 11.1 (L) 11.7 - 15.7 g/dL Final   08/23/2018 11.0 (L) 11.7 - 15.7 g/dL Final     No imaging studies have been ordered    Mychal Lindo MD

## 2019-03-02 VITALS
HEIGHT: 64 IN | HEART RATE: 92 BPM | RESPIRATION RATE: 16 BRPM | TEMPERATURE: 97.7 F | DIASTOLIC BLOOD PRESSURE: 77 MMHG | SYSTOLIC BLOOD PRESSURE: 109 MMHG | WEIGHT: 178 LBS | BODY MASS INDEX: 30.39 KG/M2

## 2019-03-02 PROCEDURE — 40000084 ZZH STATISTIC IP LACTATION SERVICES 16-30 MIN

## 2019-03-02 PROCEDURE — 25000132 ZZH RX MED GY IP 250 OP 250 PS 637: Performed by: OBSTETRICS & GYNECOLOGY

## 2019-03-02 RX ADMIN — IBUPROFEN 600 MG: 600 TABLET ORAL at 04:30

## 2019-03-02 NOTE — LACTATION NOTE
"Lactation visit. Patient breast feeding  at time of visit.  in cradle position on right breast with wide mouth, lips flanged, and strong and nutritive sucks, with many swallows noted. Pointed out swallows to patient and correct things to look for regarding 's latch. She states that sometimes latching is challenging and  is sleepy. Provided education and support. No questions at this time. Gave patient hydrogel pads for comfort due to her complaints of \"very sore nipples.\" Education provided on use. Left nipple intact, does not appear reddened at this time. Patient will call PRN.  "

## 2019-03-02 NOTE — PLAN OF CARE
Vitals stable.  present and supportive. Showered tonight. Eating and voiding well. Using ibuprofen for perineal discomfort with good results.

## 2019-03-02 NOTE — DISCHARGE INSTRUCTIONS
[facility]: [phone]  Lactation: [phone]    Postpartum pain control:    You will likely experience cramping for 1-2 weeks.   You can take up to 3 tabs of ibuprofen (600mg) every 6 hours as needed for pain.  You can additionally take 1-2 tabs of tylenol (325-650mg regular strength 500-1000mg extra strength), every 6 hours for additional pain control as needed.  It is best to alternate your medications so you are taking something every 3 hours if you are having continued pain.    If you have vaginal pain you can take sitz baths 1-2x/day. Fill the tub with 2-3 inches of warm water and soak the perineal and vaginal area for 10 minutes. Ice or warm packs can also be applied for comfort.    Please call the office for:  Temperature above 100.4  Severe headache, especially with changes in your vision  Heavy bleeding (more than one pad an hour for more than 2 hours in a row)  Any other new, concerning symptoms.    Constipation  You may use the following products to ease constipation:    1. Stool softeners such as metamucil or benefiber  2. senna 1-2 times daily  3. Fiber supplements  4. miralax (over the counter).  5. Dulcolax    Please be sure to keep adequately hydrated; 6-8 8oz glasses daily, more if needed to compensate for exercise, sweating, etc.      More specific dosing can be found below:    - Metamucil 28g daily PO with 8oz of water  - senna-docusate 8.6-50 MG per tablet PO 1 tablet, Oral, 2 TIMES DAILY, Start with 1 tablet PO BID, reduce to 1 tablet daily when having daily BMs. Stop for loose stools.  - docusate sodium (COLACE) capsule 100 mg, Oral, 2 TIMES DAILY, To prevent constipation. Hold for loose stools.  - bisacodyl (DULCOLAX) suppository 10 mg, Rectal, DAILY PRN, constipation, Hold for loose stools.       Please contact the clinic with any questions.  Please schedule a follow up appointment with your primary OB/Gyn provider in 6 weeks.   You can respond with [place] or call Valentin at  [phone].      Vaginal Delivery Discharge Instructions: [place]  [place]:     Waydiiso qoyska iyo saaxibadaa inay ku caawiyaan markaad u baahantahay.    Waxba mcdermott kor saarin ama mcdermott galin farjigaaga ilaa uu dhakhtarkaagu ansixiyo.    Si fudud u qaado dhowrka asbuuc e xiga si aad u ogalaato in [facility] nury kabto. Waxaad samayn kartaa howl kasta oo aad rabto ilaa meeshaas laga gaarayo.    Gaadhi mcdermott wadin markaad qaadanayso  kaniiniyada xanuunka ee dhkhatarku kuu qoray . waxaad gaadhi wadi kartaa haddii aad qaadanayso kaniiniyada xanuunka ee koontarka.    Wac bixiyahaaga daryeelka caafimaaad haddii aad qabtid mid ka mid ah calaamadahan nury socda:    Haddii suufka dhiigga nuuga uu ku buuxsamo 1 saac gudihiis, ama aad aragto xinjiro dhiig ah oo ka wayn kubadda golafka.     Dhiig soconaya wax kabadan 6 asbuuc.    Haddii aad qabto dheecaan farjiga ka imaanaya oo  si xun u uraya.     [facility] 100.4  F (38  C) am aka sii saraysa (heerkulka laga qaaday carabka hoostiiisa), oo qarqaryo leh ama aan lahayn     Xanuun, nabar, majiirid daran oo aad ka dareeto qaybta hoose ee ubucda.    Xanuun sii kordya, barar, guduudasho ama dheecaan ka dhiimaya meesha la tolay ee qaliinka.    Kaadi badan oo dagdag ah oo markasta ku qabanaysa , ama gubasho aad dareento markaad kaajayso.    Guduudasho, barar, ama xanuun aad ka dareento xididada lugta.    Dhibaato kaa haysata naas nuujinta, ama guduudasho ama xanuun naaska ah.    Xanuun sii kordhaya ama aan ka dhamaanayn meesha la tolay ama danqashada dilaaca.    Lalabbo ama matag.    Xabad xanuun iyo qufac ama naqaska oo mathew wilhelm.    Dhibaato ay la socoto charissa ballard aa walwal.     Haddii aad qabtid debbie bustos oo lakisha saabsurbano inaad waxyeelayso naftaada ama ilmaha, [facility] snehalrita valenciaiinaldo.     Haddii aad qabto aguero aalo lay banuelos noqoto kadib.    Gacmahaagga nadiifi:  Markasta dhaqa gacahaaga ka hor inta aadan taaban farjiga agagaarkiisa  iyo meesha la tolay.  Jhony caceres caawiniaysaa inaylin vuongado infakshanku. Hdii gacmahaagu mark figueroa gacmaha ku tirtirto si aad u nadiifiso gacmahaaga. Cidichanningoswaldo nadiifi ooo jar.      Vaginal Delivery Discharge Instructions  Activity:     Ask family and friends for help when you need it.    Do not place anything in your vagina until your doctor approves.    Take it easy for the next few weeks to allow your body to recover. You may do any activities you feel up to at that point.    Do not drive while taking pain pills prescribed by your doctor. You may drive if taking over-the-counter pain pills.    Call your health care provider if you have any of these symptoms:    You soak a sanitary pad with blood within 1 hour, or you see blood clots larger than a golf ball.    Bleeding that lasts more than 6 weeks.    You have vaginal discharge that smells bad.     A fever of 100.4  F (38  C) or higher (temperature taken under your tongue), with or without chills     Severe, pain, cramping or tenderness in your lower belly area.    Increased pain, swelling, redness or fluid around your stitches.    A more frequent or urgent need to urinate (pee), or it burns when you pee.    Redness, swelling or pain around a vein in your leg.    Problems breastfeeding, or a red or painful area on your breast.    Pain that increases or does not go away from an episiotomy or perineal tear.    Nausea and vomiting.    Chest pain and cough or are gasping for air.    Problems coping with sadness, anxiety, or depression.     If you have any concerns about hurting yourself or the baby, call your doctor right away.      You have questions or concerns after you return home.    Keep your hands clean:  Always wash your hands before touching your perineal area and stitches.  This helps reduce your risk of infection.  If your hands aren t dirty, you may use an alcohol hand-rub to clean your hands. Keep your nails clean and short.

## 2019-03-02 NOTE — DISCHARGE SUMMARY
[facility]    Discharge Summary  Obstetrics    Date of Admission:  2019  Date of Discharge:  No discharge date for patient encounter.  Discharging Provider: Loren Fuller    Discharge Diagnoses   Status post IOL and vaginal delivery    History of Present Illness   Elidia Barnes is a 29 year old female who presented for IOL for prior IUFD and nonreassuring fetal status on  testing. She had a vaginal delivery.    Hospital Course   The patient's hospital course was unremarkable.  She recovered as anticipated and experienced no post-delivery complications.  On discharge, her pain was well controlled. Vaginal bleeding is similar to peak menstrual flow.  Voiding without difficulty.  Ambulating well and tolerating a normal diet.  No fevers.  Breastfeeding well.  Infant is stable.  She was discharged on post-partum day #2.    Post-partum hemoglobin:   Hemoglobin   Date Value Ref Range Status   2019 11.1 (L) 11.7 - 15.7 g/dL Final       Loren Fuller    Discharge Disposition   Discharged to home   Condition at discharge: Stable    Primary Care Physician   [facility]    Consultations This Hospital Stay   ANESTHESIOLOGY IP CONSULT  HOME CARE POST PARTUM/ IP CONSULT  LACTATION IP CONSULT    Discharge Orders      Activity    Review discharge instructions     Reason for your hospital stay    Maternity care     Discharge Instructions - Postpartum visit    Schedule postpartum visit with your provider and return to clinic in 6 weeks.     Diet    Resume previous diet     Discharge Medications   Current Discharge Medication List      CONTINUE these medications which have NOT CHANGED    Details   ferrous sulfate (IRON) 325 (65 Fe) MG tablet Take 1 tablet (325 mg) by mouth daily (with breakfast)  Qty: 30 tablet, Refills: 2    Associated Diagnoses: Other iron deficiency anemia      Prenatal Vit-Fe Fumarate-FA (PRENATAL MULTIVITAMIN PLUS IRON) 27-0.8 MG TABS per tablet  Take 1 tablet by mouth daily         STOP taking these medications       aspirin (ASA) 81 MG chewable tablet Comments:   Reason for Stopping:             Allergies   Allergies   Allergen Reactions     Blood Transfusion Related (Informational Only) Other (See Comments)     Patient has a history of a clinically significant antibody against RBC antigens.  A delay in compatible RBCs may occur.     No Known Allergies

## 2019-03-02 NOTE — PLAN OF CARE
Pt. VSS. Pain managed with PRN ibuprofen. Postpartum check, WDL. Independent in infant and personal cares. Breastfeeding infant. Attentive to infant needs and bonding well with infant.

## 2019-03-02 NOTE — PLAN OF CARE
Vss afebrile. Pt denies pain.  Ff/1. Min vag bleeding. Pt breastfeeding well. Voiding without problems. Pt attentive to baby's needs. Fob present and supportive.   Pt discharge to home. Pt wanted  to eat lunch and feed baby before leaving. discharge instructions reviewed with pt. Pt understood all instructions, meds, F/U appts needed and s/s of when to call the dr/911 or return to er/clinic. No new rx ordered/filled. Pt instructed to by otc ibuprofen/tylenol for home.   1515 pt left for home.  All belongings taken with pt.

## 2019-03-03 LAB
BLD PROD TYP BPU: NORMAL
BLD PROD TYP BPU: NORMAL
BLD UNIT ID BPU: 0
BLD UNIT ID BPU: 0
BLOOD PRODUCT CODE: NORMAL
BLOOD PRODUCT CODE: NORMAL
BPU ID: NORMAL
BPU ID: NORMAL
TRANSFUSION STATUS PATIENT QL: NORMAL

## 2019-03-05 ENCOUNTER — ALLIED HEALTH/NURSE VISIT (OUTPATIENT)
Dept: NURSING | Facility: CLINIC | Age: 29
End: 2019-03-05

## 2019-03-05 VITALS — SYSTOLIC BLOOD PRESSURE: 108 MMHG | DIASTOLIC BLOOD PRESSURE: 68 MMHG

## 2019-03-05 DIAGNOSIS — D50.8 OTHER IRON DEFICIENCY ANEMIA: ICD-10-CM

## 2019-03-05 PROCEDURE — 99207 ZZC NO CHARGE NURSE ONLY: CPT

## 2019-03-05 RX ORDER — FERROUS SULFATE 325(65) MG
325 TABLET ORAL
Qty: 30 TABLET | Refills: 1 | Status: SHIPPED | OUTPATIENT
Start: 2019-03-05 | End: 2019-04-29

## 2019-03-05 NOTE — NURSING NOTE
Pt here with her  for a nurse only BP check. Pts  called this am and spoke with a nurse. Stated that the pt was c/o HA and swelling. Has a hx of pre-eclampsia.    Once in office, pt stated that she took tylenol with no relief and then took ibuprofen which did help relieve her HA.    Denies any visual disturbances.     BP in office now was 108/68.     Advised pt to call if HA continues after using tylenol and ibuprofen. Also advised to increase water intake and to make sure that she is eating every 2-3 hours as she is breast feeding her .    Jada GREEN, RN

## 2019-04-29 ENCOUNTER — PRENATAL OFFICE VISIT (OUTPATIENT)
Dept: OBGYN | Facility: CLINIC | Age: 29
End: 2019-04-29
Payer: COMMERCIAL

## 2019-04-29 VITALS
DIASTOLIC BLOOD PRESSURE: 70 MMHG | HEIGHT: 64 IN | SYSTOLIC BLOOD PRESSURE: 98 MMHG | WEIGHT: 160 LBS | BODY MASS INDEX: 27.31 KG/M2

## 2019-04-29 PROCEDURE — 99207 ZZC POST PARTUM EXAM: CPT | Performed by: OBSTETRICS & GYNECOLOGY

## 2019-04-29 RX ORDER — CHOLECALCIFEROL (VITAMIN D3) 50 MCG
1 TABLET ORAL DAILY
COMMUNITY
End: 2023-03-20

## 2019-04-29 ASSESSMENT — MIFFLIN-ST. JEOR: SCORE: 1435.76

## 2019-04-29 ASSESSMENT — PATIENT HEALTH QUESTIONNAIRE - PHQ9: SUM OF ALL RESPONSES TO PHQ QUESTIONS 1-9: 1

## 2019-04-29 NOTE — NURSING NOTE
"Chief Complaint   Patient presents with     Postpartum Care     del 19 emale       Initial BP 98/70 (BP Location: Left arm, Patient Position: Chair, Cuff Size: Adult Regular)   Ht 1.626 m (5' 4\")   Wt 72.6 kg (160 lb)   BMI 27.46 kg/m   Estimated body mass index is 27.46 kg/m  as calculated from the following:    Height as of this encounter: 1.626 m (5' 4\").    Weight as of this encounter: 72.6 kg (160 lb).  BP completed using cuff size: regular    Questioned patient about current smoking habits.  Pt. has never smoked.          The following HM Due: NONE    Sylvia Talavera CMA    "

## 2019-04-29 NOTE — PROGRESS NOTES
"  SUBJECTIVE:  Elidia Barnes,  is here for a postpartum visit.  She had a  on 19 delivering a healthy baby girl, named Farideh weighing 7 lbs 2 oz at term.      HPI:  No complaints today.  Last PHQ-9 score on record= 1    Delivery complications:  No  Breast feeding:  Yes, exclusively  Bladder problems:  No  Bowel problems/hemorrhoids:  No  Episiotomy/laceration/incision healed? No  Vaginal flow:  Continued for 6 weeks, resolved, had return of light bleeding with resolution yesterday. No bleeding today.  [place]:  No  Contraception: none, declines  Emotional adjustment:  doing well and happy  Back to work:  no    12 point review of systems negative other than symptoms noted below.    OBJECTIVE:  Vitals: BP 98/70 (BP Location: Left arm, Patient Position: Chair, Cuff Size: Adult Regular)   Ht 1.626 m (5' 4\")   Wt 72.6 kg (160 lb)   BMI 27.46 kg/m    BMI= Body mass index is 27.46 kg/m .  General - pleasant female in no acute distress.  Breast -  deferred  Abdomen - No incision  Pelvic - EG: normal adult female,  Vagina: well rugated, no discharge, perineum well healed. Cervix: no lesions or CMT, Uterus: firm, normal sized and nontender, anteverted in position. Adnexae: no masses or tenderness.  Rectovaginal - deferred.    ASSESSMENT:    ICD-10-CM    1. Routine postpartum follow-up Z39.2        PLAN:  May resume normal activities without restrictions.  Pap smear was not done today. Last 2018 NIL, due 2021  Full counseling was provided, and all questions were answered.   Return to clinic in one year for an annual visit.   Discussed options for birth control - declined.   Continue PNV.   Concha Prather, DO  OB/GYN        "

## 2019-12-16 NOTE — TELEPHONE ENCOUNTER
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 They are requiring a chest X-ray to complete the form for immigration.   Immigration has written her an order for this. Is it safe for patient to proceed in doing this? If not we need a note stating so and why.    Pt will be in for appointment with nurse 11/20. This is needed before 12/4. Next appt with Dr. Prather isn't until 12/10.

## 2021-07-31 ENCOUNTER — OFFICE VISIT (OUTPATIENT)
Dept: URGENT CARE | Facility: URGENT CARE | Age: 31
End: 2021-07-31
Payer: COMMERCIAL

## 2021-07-31 VITALS
OXYGEN SATURATION: 98 % | RESPIRATION RATE: 20 BRPM | DIASTOLIC BLOOD PRESSURE: 80 MMHG | TEMPERATURE: 98 F | HEART RATE: 78 BPM | SYSTOLIC BLOOD PRESSURE: 137 MMHG

## 2021-07-31 DIAGNOSIS — R53.83 FATIGUE, UNSPECIFIED TYPE: Primary | ICD-10-CM

## 2021-07-31 DIAGNOSIS — M54.9 BACK PAIN, UNSPECIFIED BACK LOCATION, UNSPECIFIED BACK PAIN LATERALITY, UNSPECIFIED CHRONICITY: ICD-10-CM

## 2021-07-31 LAB
BASOPHILS # BLD AUTO: 0 10E3/UL (ref 0–0.2)
BASOPHILS NFR BLD AUTO: 0 %
EOSINOPHIL # BLD AUTO: 0.1 10E3/UL (ref 0–0.7)
EOSINOPHIL NFR BLD AUTO: 1 %
ERYTHROCYTE [DISTWIDTH] IN BLOOD BY AUTOMATED COUNT: 13.7 % (ref 10–15)
HCG SERPL QL: NEGATIVE
HCG UR QL: NEGATIVE
HCT VFR BLD AUTO: 34.9 % (ref 35–47)
HGB BLD-MCNC: 11.4 G/DL (ref 11.7–15.7)
LYMPHOCYTES # BLD AUTO: 2.9 10E3/UL (ref 0.8–5.3)
LYMPHOCYTES NFR BLD AUTO: 35 %
MCH RBC QN AUTO: 28.1 PG (ref 26.5–33)
MCHC RBC AUTO-ENTMCNC: 32.7 G/DL (ref 31.5–36.5)
MCV RBC AUTO: 86 FL (ref 78–100)
MONOCYTES # BLD AUTO: 1.1 10E3/UL (ref 0–1.3)
MONOCYTES NFR BLD AUTO: 13 %
NEUTROPHILS # BLD AUTO: 4.3 10E3/UL (ref 1.6–8.3)
NEUTROPHILS NFR BLD AUTO: 52 %
PLATELET # BLD AUTO: 318 10E3/UL (ref 150–450)
RBC # BLD AUTO: 4.05 10E6/UL (ref 3.8–5.2)
WBC # BLD AUTO: 8.3 10E3/UL (ref 4–11)

## 2021-07-31 PROCEDURE — 36415 COLL VENOUS BLD VENIPUNCTURE: CPT | Performed by: FAMILY MEDICINE

## 2021-07-31 PROCEDURE — 85025 COMPLETE CBC W/AUTO DIFF WBC: CPT | Performed by: FAMILY MEDICINE

## 2021-07-31 PROCEDURE — 99213 OFFICE O/P EST LOW 20 MIN: CPT | Performed by: FAMILY MEDICINE

## 2021-07-31 PROCEDURE — 84703 CHORIONIC GONADOTROPIN ASSAY: CPT | Performed by: FAMILY MEDICINE

## 2021-07-31 PROCEDURE — 81025 URINE PREGNANCY TEST: CPT | Performed by: FAMILY MEDICINE

## 2021-08-01 ENCOUNTER — NURSE TRIAGE (OUTPATIENT)
Dept: NURSING | Facility: CLINIC | Age: 31
End: 2021-08-01

## 2021-08-01 NOTE — TELEPHONE ENCOUNTER
I called and discussed in detail with patient  Hemoglobin a little low but similar to last few years  She has been tired  Could try an over the counter iron pill    I did advise patient follow up with primary clinic in [place] in the next week or so    Serum hcg neg but this could be repeated in near future if needed     Mark Chu MD

## 2021-08-01 NOTE — PATIENT INSTRUCTIONS
Stay well hydrated    We will notify you of lab results    Be seen promptly if symptoms acutely worsen

## 2021-08-01 NOTE — TELEPHONE ENCOUNTER
Triage Call:    -Patient was seen yesterday at [facility] in [place]  -Patient had labs done and was told she would receive a call regarding results.  -Patient states someone called her yesterday and left a voicemail that they will reach out to patient today with results, but patient has not received any call.    -Results have not been read by a provider. RN cannot give out results unless provider has addressed at [facility].   -RN called over to [facility] at [place] and spoke with staff member who directed RN to Mala ,Medical Assitant at [facility]. Mala stated that the providers are currently in rooms with patients and that once they are out of the rooms they will review patients labs and Mala or someone on the medical team will reach out to patient today to go through results. Mala was given patients direct number to call regarding results.     -RN informed patient of this and advise patient keep her phone near her and on loud. Patient verbalized understanding and agrees with plan.     Sylvie Barragan RN, BSN Nurse Triage Advisor 3:08 PM 8/1/2021     Reason for Disposition    Caller requesting lab results     [facility] will call patient back with results.    Protocols used: PCP CALL - NO TRIAGE-A-    COVID 19 Nurse Triage Plan/Patient Instructions    Please be aware that novel coronavirus (COVID-19) may be circulating in the community. If you develop symptoms such as fever, cough, or SOB or if you have concerns about the presence of another infection including coronavirus (COVID-19), please contact your health care provider or visit [email or web address].     Disposition/Instructions    Additional COVID19 information to add for patients.   How can I protect others?  If you have symptoms (fever, cough, body aches or trouble breathing): Stay home and away from others (self-isolate) until:    At least 10 days have passed since your symptoms started, And     You ve had no fever--and no medicine that reduces fever--for 1 full day (24 hours),  "And      Your other symptoms have resolved (gotten better).     If you don t have symptoms, but a test showed that you have COVID-19 (you tested positive):    Stay home and away from others (self-isolate). Follow the tips under \"How do I self-isolate?\" below for 10 days (20 days if you have a weak immune system).    You don't need to be retested for COVID-19 before going back to school or work. As long as you're fever-free and feeling better, you can go back to school, work and other activities after waiting the 10 or 20 days.     How do I self-isolate?    Stay in your own room, even for meals. Use your own bathroom if you can.     Stay away from others in your home. No hugging, kissing or shaking hands. No visitors.    Don t go to work, school or anywhere else.     Clean  high touch  surfaces often (doorknobs, counters, handles, etc.). Use a household cleaning spray or wipes. You ll find a full list on the EPA website:  www.epa.gov/pesticide-registration/list-n-disinfectants-use-against-sars-cov-2.    Cover your mouth and nose with a mask, tissue or washcloth to avoid spreading germs.    Wash your hands and face often. Use soap and water.    Caregivers in these groups are at risk for severe illness due to COVID-19:  o People 65 years and older  o People who live in a nursing home or long-term care facility  o People with chronic disease (lung, heart, cancer, diabetes, kidney, liver, immunologic)  o People who have a weakened immune system, including those who:  - Are in cancer treatment  - Take medicine that weakens the immune system, such as corticosteroids  - Had a bone marrow or organ transplant  - Have an immune deficiency  - Have poorly controlled HIV or AIDS  - Are obese (body mass index of 40 or higher)  - Smoke regularly    Caregivers should wear gloves while washing dishes, handling laundry and cleaning bedrooms and bathrooms.    Use caution when washing and drying laundry: Don t shake dirty laundry, and " use the warmest water setting that you can.    For more tips, go to www.cdc.gov/coronavirus/2019-ncov/downloads/10Things.pdf.    How can I take care of myself?  1. Get lots of rest. Drink extra fluids (unless a doctor has told you not to).     2. Take Tylenol (acetaminophen) for fever or pain. If you have liver or kidney problems, ask your family doctor if it s okay to take Tylenol.     Adults can take either:     650 mg (two 325 mg pills) every 4 to 6 hours, or     1,000 mg (two 500 mg pills) every 8 hours as needed.     Note: Don t take more than 3,000 mg in one day.   Acetaminophen is found in many medicines (both prescribed and over-the-counter medicines). Read all labels to be sure you don t take too much.     For children, check the Tylenol bottle for the right dose. The dose is based on the child s age or weight.    3. If you have other health problems (like cancer, heart failure, an organ transplant or severe kidney disease): Call your specialty clinic if you don t feel better in the next 2 days.    4. Know when to call 911: Emergency warning signs include:    Trouble breathing or shortness of breath    Pain or pressure in the chest that doesn t go away    Feeling confused like you haven t felt before, or not being able to wake up    Bluish-colored lips or face    What are the symptoms of COVID-19?     The most common symptoms are cough, fever and trouble breathing.     Less common symptoms include body aches, chills, diarrhea (loose, watery poops), fatigue (feeling very tired), headache, runny nose, sore throat and loss of smell.    COVID-19 can cause severe coughing (bronchitis) and lung infection (pneumonia).    How does it spread?     The virus may spread when a person coughs or sneezes into the air. The virus can travel about 6 feet this way, and it can live on surfaces.      Common  (household disinfectants) will kill the virus.    Who is at risk?  Anyone can catch COVID-19 if they re around  someone who has the virus.    How can others protect themselves?     Stay away from people who have COVID-19 (or symptoms of COVID-19).    Wash hands often with soap and water. Or, use hand  with at least 60% alcohol.    Avoid touching the eyes, nose or mouth.     Wear a face mask when you go out in public, when sick or when caring for a sick person.    Where can I get more information?    [facility]: About COVID-19: [email or web address]    CDC: What to Do If You re Sick: www.cdc.gov/coronavirus/2019-ncov/about/steps-when-sick.html    CDC: Ending Home Isolation: www.cdc.gov/coronavirus/2019-ncov/hcp/disposition-in-home-patients.html     CDC: Caring for Someone: www.cdc.gov/coronavirus/2019-ncov/if-you-are-sick/care-for-someone.html     [facility]: Interim Guidance for Hospital Discharge to Home: www.[facility].mn./diseases/coronavirus/hcp/hospdischarge.pdf    [facility] clinical trials (COVID-19 research studies): clinicalaffairs.[facility]-clinical-trials     Below are the COVID-19 hotlines at the [place] Department of Health ([facility]). Interpreters are available.   o For health questions: Call 396-327-4382 or 1-664.367.7868 (7 a.m. to 7 p.m.)  o For questions about schools and childcare: Call 016-819-0995 or 1-171.826.2796 (7 a.m. to 7 p.m.)          Thank you for taking steps to prevent the spread of this virus.  o Limit your contact with others.  o Wear a simple mask to cover your cough.  o Wash your hands well and often.    Resources    [facility]: About COVID-19: [email or web address]    CDC: What to Do If You're Sick: www.cdc.gov/coronavirus/2019-ncov/about/steps-when-sick.html    CDC: Ending Home Isolation: www.cdc.gov/coronavirus/2019-ncov/hcp/disposition-in-home-patients.html     CDC: Caring for Someone: www.cdc.gov/coronavirus/2019-ncov/if-you-are-sick/care-for-someone.html     [facility]: Interim Guidance for Hospital Discharge to Home:  www.health.[facility].mn.us/diseases/coronavirus/hcp/hospdischarge.pdf    [facility] clinical trials (COVID-19 research studies): clinicalaffairs.[facility]/umn-clinical-trials     Below are the COVID-19 hotlines at the [facility] of [facility] ([facility]). Interpreters are available.   o For health questions: Call 770-249-6971 or 1-667.373.2319 (7 a.m. to 7 p.m.)  o For questions about schools and childcare: Call 135-900-1059 or 1-394.515.3329 (7 a.m. to 7 p.m.)

## 2021-08-01 NOTE — PROGRESS NOTES
Elidia Barnes is a 31 year old female who comes in today for cramp in abd    Started about 5 days ago, getting worse    Patient thinks she may be pregnant    One home test pos, one negative    Trying to get pregnant     Has one child    For last 6  Months fairly regular periods    Last one Radha 27    Drinking fluids okay    Some nausea    Eat some    Very tired    Feels cold    This is all new also    Was on iron in past    ASSESSMENT / PLAN:  (R53.83) Fatigue, unspecified type  (primary encounter diagnosis)  Comment: check lab cbd   Plan: CBC with Platelets & Differential             (M54.9) Back pain, unspecified back location, unspecified back pain laterality, unspecified chronicity  Comment: patient has one pos home preg test  Plan: HCG qualitative urine, HCG qualitative        upt neg here; did serum hcg.    I tried to call patient with results, not available    Plan would be to follow up in clinic next week       I reviewed the patient's medications, allergies, medical history, family history, and social history.    Mark Chu MD

## 2021-09-07 ENCOUNTER — OFFICE VISIT (OUTPATIENT)
Dept: OBGYN | Facility: CLINIC | Age: 31
End: 2021-09-07
Payer: COMMERCIAL

## 2021-09-07 ENCOUNTER — ANCILLARY PROCEDURE (OUTPATIENT)
Dept: ULTRASOUND IMAGING | Facility: CLINIC | Age: 31
End: 2021-09-07
Attending: FAMILY MEDICINE
Payer: COMMERCIAL

## 2021-09-07 VITALS
HEIGHT: 64 IN | BODY MASS INDEX: 27.55 KG/M2 | SYSTOLIC BLOOD PRESSURE: 108 MMHG | DIASTOLIC BLOOD PRESSURE: 64 MMHG | WEIGHT: 161.4 LBS

## 2021-09-07 DIAGNOSIS — E28.2 PCOS (POLYCYSTIC OVARIAN SYNDROME): ICD-10-CM

## 2021-09-07 DIAGNOSIS — R10.2 PELVIC PAIN IN FEMALE: Primary | ICD-10-CM

## 2021-09-07 DIAGNOSIS — R10.2 PELVIC PAIN IN FEMALE: ICD-10-CM

## 2021-09-07 LAB
CLUE CELLS: ABNORMAL
HCG IFA URINE: NEGATIVE
TRICHOMONAS, WET PREP: ABNORMAL
WBC'S/HIGH POWER FIELD, WET PREP: ABNORMAL
YEAST, WET PREP: ABNORMAL

## 2021-09-07 PROCEDURE — 87491 CHLMYD TRACH DNA AMP PROBE: CPT | Performed by: FAMILY MEDICINE

## 2021-09-07 PROCEDURE — 99213 OFFICE O/P EST LOW 20 MIN: CPT | Performed by: FAMILY MEDICINE

## 2021-09-07 PROCEDURE — 76830 TRANSVAGINAL US NON-OB: CPT | Performed by: OBSTETRICS & GYNECOLOGY

## 2021-09-07 PROCEDURE — 84703 CHORIONIC GONADOTROPIN ASSAY: CPT | Performed by: FAMILY MEDICINE

## 2021-09-07 PROCEDURE — 76856 US EXAM PELVIC COMPLETE: CPT | Performed by: OBSTETRICS & GYNECOLOGY

## 2021-09-07 PROCEDURE — 87210 SMEAR WET MOUNT SALINE/INK: CPT | Performed by: FAMILY MEDICINE

## 2021-09-07 PROCEDURE — 87591 N.GONORRHOEAE DNA AMP PROB: CPT | Performed by: FAMILY MEDICINE

## 2021-09-07 ASSESSMENT — MIFFLIN-ST. JEOR: SCORE: 1432.11

## 2021-09-07 NOTE — PATIENT INSTRUCTIONS
Return as needed   Will call with result     Dr. Mercedez Gustafson, DO    Obstetrics and Gynecology  [facility]

## 2021-09-07 NOTE — NURSING NOTE
"Chief Complaint   Patient presents with     Pelvic Pain     cramping and pain in early August--negative blood and urine pregnancy test--pt has PCOS--discuss clomid       Initial /64   Ht 1.626 m (5' 4\")   Wt 73.2 kg (161 lb 6.4 oz)   LMP 2021 (Exact Date)   BMI 27.70 kg/m   Estimated body mass index is 27.7 kg/m  as calculated from the following:    Height as of this encounter: 1.626 m (5' 4\").    Weight as of this encounter: 73.2 kg (161 lb 6.4 oz).  BP completed using cuff size: regular    Questioned patient about current smoking habits.  Pt. has never smoked.          The following HM Due: NONE           "

## 2021-09-07 NOTE — PROGRESS NOTES
SUBJECTIVE:  Elidia Barnes is an 31 year old   woman who presents for   gynecology consult for history of clomid, PCOS. Had pelvic pain end of July with cramping x 2 weeks.       Patient's last menstrual period was 2021 (exact date).   Periods are regular q 28-30 days, lasting 4 days. Menarche @ age teenager, Dysmenorrhea:mild, occurring premenstrually and first 1-2 days of flow. Cyclic symptoms include none. No intermenstrual bleeding,   spotting, or discharge.    Current contraception: none  Pap smear: normal      Past Medical History:   Diagnosis Date     NO ACTIVE PROBLEMS      PIH (pregnancy induced hypertension)      Vaginal delivery 2019          Family History   Problem Relation Age of Onset     Family History Negative Mother        Past Surgical History:   Procedure Laterality Date     NO HISTORY OF SURGERY         Current Outpatient Medications   Medication     Prenatal Vit-Fe Fumarate-FA (PRENATAL MULTIVITAMIN PLUS IRON) 27-0.8 MG TABS per tablet     vitamin D3 (CHOLECALCIFEROL) 2000 units tablet     No current facility-administered medications for this visit.     Allergies   Allergen Reactions     Blood Transfusion Related (Informational Only) Other (See Comments)     Patient has a history of a clinically significant antibody against RBC antigens.  A delay in compatible RBCs may occur.     No Known Allergies        Social History     Tobacco Use     Smoking status: Never Smoker     Smokeless tobacco: Never Used   Substance Use Topics     Alcohol use: No       Review Of Systems  Ears/Nose/Throat: negative  Respiratory: No shortness of breath, dyspnea on exertion, cough, or hemoptysis  Cardiovascular: negative  Gastrointestinal: negative  Genitourinary: positive for negative and pelvic pain  Constitutional, HEENT, cardiovascular, pulmonary, GI, , musculoskeletal, neuro, skin, endocrine and psych systems are negative, except as otherwise noted.    OBJECTIVE:  /64   Ht 1.626  "m (5' 4\")   Wt 73.2 kg (161 lb 6.4 oz)   LMP 2021 (Exact Date)   BMI 27.70 kg/m    General appearance: healthy, alert and no distress  Skin: Skin color, texture, turgor normal. No rashes or lesions.  Ears: negative  Nose/Sinuses: Nares normal. Septum midline. Mucosa normal. No drainage or sinus tenderness.  Oropharynx: Lips, mucosa, and tongue normal. Teeth and gums normal.  Neck: Neck supple. No adenopathy. Thyroid symmetric, normal size,, Carotids without bruits.  Lungs: negative, Percussion normal. Good diaphragmatic excursion. Lungs clear  Heart: negative, PMI normal. No lifts, heaves, or thrills. RRR. No murmurs, clicks gallops or rub  Breasts: negative  Abdomen: Abdomen soft, non-tender. BS normal. No masses, organomegaly  Pelvic: Pelvic:  Pelvic examination with no pap/yes wet prep and  Gonorrhea and Chlamydia   including  External genitalia normal   and vagina normal rugatted not atrophic  Examination of urethra  normal no masses, tenderness, scarring  bladder, no masses or tenderness  Cervix no lesions or discharge  Bimanual exam with   Uterus 7 weeks size, mid position, mobile,no-tenderness, no descent   Adnexa/parametria  Normal, on masses     ASSESSMENT:  Elidia Barnes is an 31 year old   woman who presents for   gynecology consult for history of clomid, PCOS. Had pelvic pain end of July with cramping x 2 weeks.  History of PIH causing stillbirth.  Last pregnancy was treated with asa, start with pregnancy or at 12 weeks.   PLAN:  Dx:  1)  Pelvic pain end of July with 2 weeks of cramping.  Worries about infection.   Check wet prep, Gonorrhea  And Chlamydia today   2)  Hx of PCOS:  Check pelvic ultrasound. Plan to prescribe clomid once the ultrasound is back.   Last time needed 100 mg to achieve pregnancy.  Last time just needed 2 months to get pregnant.     Labs were reviewed in [facility]   Imaging was reviewed in Epic   Tests and documents were reviewed.   Discussion of management or " test interpretation          Dr. Mercedez Gustafson,     Obstetrics and Gynecology  [facility] [place]

## 2021-09-08 LAB
C TRACH DNA SPEC QL NAA+PROBE: NEGATIVE
N GONORRHOEA DNA SPEC QL NAA+PROBE: NEGATIVE

## 2021-09-08 RX ORDER — CLOMIPHENE CITRATE 50 MG/1
50 TABLET ORAL SEE ADMIN INSTRUCTIONS
Qty: 30 TABLET | Refills: 0 | Status: SHIPPED | OUTPATIENT
Start: 2021-09-08 | End: 2023-01-26

## 2022-03-28 ENCOUNTER — HOSPITAL ENCOUNTER (EMERGENCY)
Facility: CLINIC | Age: 32
Discharge: HOME | End: 2022-03-28
Attending: EMERGENCY MEDICINE | Admitting: EMERGENCY MEDICINE
Payer: COMMERCIAL

## 2022-03-28 ENCOUNTER — APPOINTMENT (OUTPATIENT)
Dept: ULTRASOUND IMAGING | Facility: CLINIC | Age: 32
End: 2022-03-28
Attending: EMERGENCY MEDICINE
Payer: COMMERCIAL

## 2022-03-28 DIAGNOSIS — O20.0 THREATENED MISCARRIAGE IN EARLY PREGNANCY: ICD-10-CM

## 2022-03-28 LAB
ALBUMIN UR-MCNC: 10 MG/DL
ANION GAP SERPL CALCULATED.3IONS-SCNC: 1 MMOL/L (ref 3–14)
APPEARANCE UR: CLEAR
B-HCG SERPL-ACNC: ABNORMAL IU/L (ref 0–5)
BASOPHILS # BLD AUTO: 0 10E3/UL (ref 0–0.2)
BASOPHILS NFR BLD AUTO: 1 %
BILIRUB UR QL STRIP: NEGATIVE
BUN SERPL-MCNC: 16 MG/DL (ref 7–30)
CALCIUM SERPL-MCNC: 9.3 MG/DL (ref 8.5–10.1)
CAOX CRY #/AREA URNS HPF: ABNORMAL /HPF
CHLORIDE BLD-SCNC: 106 MMOL/L (ref 94–109)
CO2 SERPL-SCNC: 28 MMOL/L (ref 20–32)
COLOR UR AUTO: YELLOW
CREAT SERPL-MCNC: 0.48 MG/DL (ref 0.52–1.04)
EOSINOPHIL # BLD AUTO: 0.1 10E3/UL (ref 0–0.7)
EOSINOPHIL NFR BLD AUTO: 2 %
ERYTHROCYTE [DISTWIDTH] IN BLOOD BY AUTOMATED COUNT: 12.9 % (ref 10–15)
GFR SERPL CREATININE-BSD FRML MDRD: >90 ML/MIN/1.73M2
GLUCOSE BLD-MCNC: 97 MG/DL (ref 70–99)
GLUCOSE UR STRIP-MCNC: NEGATIVE MG/DL
HCG UR QL: POSITIVE
HCT VFR BLD AUTO: 37.7 % (ref 35–47)
HGB BLD-MCNC: 12.3 G/DL (ref 11.7–15.7)
HGB UR QL STRIP: ABNORMAL
HOLD SPECIMEN: NORMAL
IMM GRANULOCYTES # BLD: 0 10E3/UL
IMM GRANULOCYTES NFR BLD: 0 %
KETONES UR STRIP-MCNC: NEGATIVE MG/DL
LEUKOCYTE ESTERASE UR QL STRIP: NEGATIVE
LYMPHOCYTES # BLD AUTO: 2.6 10E3/UL (ref 0.8–5.3)
LYMPHOCYTES NFR BLD AUTO: 40 %
MCH RBC QN AUTO: 30.4 PG (ref 26.5–33)
MCHC RBC AUTO-ENTMCNC: 32.6 G/DL (ref 31.5–36.5)
MCV RBC AUTO: 93 FL (ref 78–100)
MONOCYTES # BLD AUTO: 0.6 10E3/UL (ref 0–1.3)
MONOCYTES NFR BLD AUTO: 10 %
MUCOUS THREADS #/AREA URNS LPF: PRESENT /LPF
NEUTROPHILS # BLD AUTO: 3.1 10E3/UL (ref 1.6–8.3)
NEUTROPHILS NFR BLD AUTO: 47 %
NITRATE UR QL: NEGATIVE
NRBC # BLD AUTO: 0 10E3/UL
NRBC BLD AUTO-RTO: 0 /100
PH UR STRIP: 5.5 [PH] (ref 5–7)
PLATELET # BLD AUTO: 264 10E3/UL (ref 150–450)
POTASSIUM BLD-SCNC: 3.7 MMOL/L (ref 3.4–5.3)
RBC # BLD AUTO: 4.05 10E6/UL (ref 3.8–5.2)
RBC URINE: 3 /HPF
SODIUM SERPL-SCNC: 135 MMOL/L (ref 133–144)
SP GR UR STRIP: 1.03 (ref 1–1.03)
SQUAMOUS EPITHELIAL: <1 /HPF
UROBILINOGEN UR STRIP-MCNC: NORMAL MG/DL
WBC # BLD AUTO: 6.5 10E3/UL (ref 4–11)
WBC URINE: 2 /HPF

## 2022-03-28 PROCEDURE — 81025 URINE PREGNANCY TEST: CPT | Performed by: EMERGENCY MEDICINE

## 2022-03-28 PROCEDURE — 99285 EMERGENCY DEPT VISIT HI MDM: CPT | Mod: 25

## 2022-03-28 PROCEDURE — 76801 OB US < 14 WKS SINGLE FETUS: CPT

## 2022-03-28 PROCEDURE — 85025 COMPLETE CBC W/AUTO DIFF WBC: CPT | Performed by: EMERGENCY MEDICINE

## 2022-03-28 PROCEDURE — 36415 COLL VENOUS BLD VENIPUNCTURE: CPT | Performed by: EMERGENCY MEDICINE

## 2022-03-28 PROCEDURE — 82310 ASSAY OF CALCIUM: CPT | Performed by: EMERGENCY MEDICINE

## 2022-03-28 PROCEDURE — 81001 URINALYSIS AUTO W/SCOPE: CPT | Performed by: EMERGENCY MEDICINE

## 2022-03-28 PROCEDURE — 84702 CHORIONIC GONADOTROPIN TEST: CPT | Performed by: EMERGENCY MEDICINE

## 2022-03-28 ASSESSMENT — ENCOUNTER SYMPTOMS: ABDOMINAL PAIN: 1

## 2022-03-28 NOTE — ED TRIAGE NOTES
Pt reports that she had a miscarriage at 1400 today, pt reports that she saw the products of conception in the toilet and more bleeding on tissues, pt thinks she was approximately 7-8 wks pregnant. PT reports that she has lower abdominal pain now with back pain and vaginal bleeding. Pt reports that she was going through a pad an hour for the first couple of hours and is less now. PT VSS and ABC's intact

## 2022-03-29 VITALS
HEART RATE: 79 BPM | RESPIRATION RATE: 18 BRPM | OXYGEN SATURATION: 100 % | DIASTOLIC BLOOD PRESSURE: 52 MMHG | WEIGHT: 120 LBS | TEMPERATURE: 98.4 F | SYSTOLIC BLOOD PRESSURE: 99 MMHG

## 2022-03-29 NOTE — ED PROVIDER NOTES
History   Chief Complaint:  Vaginal Bleeding       The history is provided by the patient.      Silvana Zamudio is a 32 year old female  with history of miscarriages x3 who presents with vaginal bleeding. The patient's LMP was on  and she recently had a positive at home pregnancy test. She had not scheduled an appointment with an OB/GYN or had an ultrasound performed. Today, at approximately 1400, the patient had onset of vaginal bleeding with clots and lower abdominal pain. Per Triage Notes, she was going through one pad/hour for the first couple of hours but bleeding has since decreased. She took Tylenol at home. She was initially seen at the [facility] but then prompted to visit the emergency department for further evaluation. Additionally, her blood type is O+.     Review of Systems   Gastrointestinal: Positive for abdominal pain (lower).   Genitourinary: Positive for vaginal bleeding.   All other systems reviewed and are negative.    Allergies:  The patient has no known allergies.     Medications:  The patient denies any current medications.     Past Medical History:     Miscarriage x3    Past Surgical History:    Dilation and curettage     Social History:  The patient is .   She has five children at home.   She presents to the emergency department alone.     Physical Exam     Patient Vitals for the past 24 hrs:   BP Temp Temp src Pulse Resp SpO2 Weight   22 1825 -- -- -- -- -- -- 54.4 kg (120 lb)   22 1824 114/60 98.4  F (36.9  C) Temporal 79 18 98 % --       Physical Exam  Vitals reviewed.   Cardiovascular:      Rate and Rhythm: Normal rate and regular rhythm.   Pulmonary:      Effort: Pulmonary effort is normal.      Breath sounds: Normal breath sounds.   Abdominal:      General: Abdomen is flat.      Palpations: Abdomen is soft.   Neurological:      General: No focal deficit present.      Mental Status: She is alert.   Psychiatric:         Mood and Affect:  Mood normal.           Emergency Department Course     Imaging:  OB  US 1st trimester w transvag   Final Result   IMPRESSION:    1. Single intrauterine gestational sac which contains a yolk sac and a single embryo which has a crown-rump length of 6.6 mm with no cardiac activity seen. Given the above criteria, findings would be suspicious for but not diagnostic of a pregnancy    failure, I will recommend a follow-up ultrasound examination in 2 weeks for reevaluation.        Report per radiology    Laboratory:  Labs Ordered and Resulted from Time of ED Arrival to Time of ED Departure   HCG QUALITATIVE URINE - Abnormal       Result Value    hCG Urine Qualitative Positive (*)    ROUTINE UA WITH MICROSCOPIC REFLEX TO CULTURE - Abnormal    Color Urine Yellow      Appearance Urine Clear      Glucose Urine Negative      Bilirubin Urine Negative      Ketones Urine Negative      Specific Gravity Urine 1.033      Blood Urine Small (*)     pH Urine 5.5      Protein Albumin Urine 10  (*)     Urobilinogen Urine Normal      Nitrite Urine Negative      Leukocyte Esterase Urine Negative      Mucus Urine Present (*)     Calcium Oxalate Crystals Urine Moderate (*)     RBC Urine 3 (*)     WBC Urine 2      Squamous Epithelials Urine <1     BASIC METABOLIC PANEL - Abnormal    Sodium 135      Potassium 3.7      Chloride 106      Carbon Dioxide (CO2) 28      Anion Gap 1 (*)     Urea Nitrogen 16      Creatinine 0.48 (*)     Calcium 9.3      Glucose 97      GFR Estimate >90     CBC WITH PLATELETS AND DIFFERENTIAL    WBC Count 6.5      RBC Count 4.05      Hemoglobin 12.3      Hematocrit 37.7      MCV 93      MCH 30.4      MCHC 32.6      RDW 12.9      Platelet Count 264      % Neutrophils 47      % Lymphocytes 40      % Monocytes 10      % Eosinophils 2      % Basophils 1      % Immature Granulocytes 0      NRBCs per 100 WBC 0      Absolute Neutrophils 3.1      Absolute Lymphocytes 2.6      Absolute Monocytes 0.6      Absolute Eosinophils 0.1       Absolute Basophils 0.0      Absolute Immature Granulocytes 0.0      Absolute NRBCs 0.0     HCG QUANTITATIVE PREGNANCY        Procedures  none    Emergency Department Course:             Reviewed:  I reviewed nursing notes, vitals and past medical history    Assessments:  2002 I obtained history and examined the patient as noted above.   2040 I rechecked the patient and explained findings.     Disposition:  The patient was discharged to home.     Impression & Plan     CMS Diagnoses: None    Medical Decision Making:  Patient presents with self suspected spontaneous miscarriage.  Patient is multi parous with multiple pregnancies and multiple miscarriages.  Patient is well-appearing with normal vitals hemoglobin is stable recommended ultrasound due to lack of prior ultrasound to confirm completed miscarriage this does identify the gestational sac.  Patient was advised that she has not completed a miscarriage or late yet based on the sac size and timeframe could still be early pregnancy recommend serial hCGs and serial ultrasounds of climbing.  Patient is offered reassurance and discharged home    Diagnosis:    ICD-10-CM    1. Threatened miscarriage in early pregnancy  O20.0        Discharge Medications:  New Prescriptions    No medications on file       Scribe Disclosure:  I, Estephanie Pittman, am serving as a scribe at 8:02 PM on 3/28/2022 to document services personally performed by Jose Norris MD based on my observations and the provider's statements to me.        Jose Norirs MD  03/29/22 1952

## 2022-03-29 NOTE — DISCHARGE INSTRUCTIONS
Your ultrasound today still shows a structure of pregnancy in the uterus.  We are unsure if we are just looking to early in pregnancy or if you are going to have a miscarriage.  Please follow-up with your regular gynecologist for recheck on your blood hCG level.  Return to the emergency room with constant or heavier vaginal bleeding or severe increase in abdominal pain.

## 2022-06-28 ENCOUNTER — OFFICE VISIT (OUTPATIENT)
Dept: URGENT CARE | Facility: URGENT CARE | Age: 32
End: 2022-06-28
Payer: COMMERCIAL

## 2022-06-28 VITALS
HEART RATE: 77 BPM | OXYGEN SATURATION: 100 % | DIASTOLIC BLOOD PRESSURE: 62 MMHG | TEMPERATURE: 98.3 F | SYSTOLIC BLOOD PRESSURE: 113 MMHG

## 2022-06-28 DIAGNOSIS — N92.1 MENORRHAGIA WITH IRREGULAR CYCLE: Primary | ICD-10-CM

## 2022-06-28 LAB
BASOPHILS # BLD AUTO: 0 10E3/UL (ref 0–0.2)
BASOPHILS NFR BLD AUTO: 0 %
EOSINOPHIL # BLD AUTO: 0.1 10E3/UL (ref 0–0.7)
EOSINOPHIL NFR BLD AUTO: 1 %
ERYTHROCYTE [DISTWIDTH] IN BLOOD BY AUTOMATED COUNT: 15 % (ref 10–15)
HCT VFR BLD AUTO: 33.5 % (ref 35–47)
HGB BLD-MCNC: 11.1 G/DL (ref 11.7–15.7)
LYMPHOCYTES # BLD AUTO: 2.2 10E3/UL (ref 0.8–5.3)
LYMPHOCYTES NFR BLD AUTO: 39 %
MCH RBC QN AUTO: 27.5 PG (ref 26.5–33)
MCHC RBC AUTO-ENTMCNC: 33.1 G/DL (ref 31.5–36.5)
MCV RBC AUTO: 83 FL (ref 78–100)
MONOCYTES # BLD AUTO: 0.7 10E3/UL (ref 0–1.3)
MONOCYTES NFR BLD AUTO: 13 %
NEUTROPHILS # BLD AUTO: 2.6 10E3/UL (ref 1.6–8.3)
NEUTROPHILS NFR BLD AUTO: 47 %
PLATELET # BLD AUTO: 315 10E3/UL (ref 150–450)
RBC # BLD AUTO: 4.03 10E6/UL (ref 3.8–5.2)
WBC # BLD AUTO: 5.6 10E3/UL (ref 4–11)

## 2022-06-28 PROCEDURE — 85025 COMPLETE CBC W/AUTO DIFF WBC: CPT | Performed by: PHYSICIAN ASSISTANT

## 2022-06-28 PROCEDURE — 36415 COLL VENOUS BLD VENIPUNCTURE: CPT | Performed by: PHYSICIAN ASSISTANT

## 2022-06-28 PROCEDURE — 99213 OFFICE O/P EST LOW 20 MIN: CPT | Performed by: PHYSICIAN ASSISTANT

## 2022-07-13 ENCOUNTER — APPOINTMENT (OUTPATIENT)
Dept: ULTRASOUND IMAGING | Facility: CLINIC | Age: 32
End: 2022-07-13
Attending: EMERGENCY MEDICINE
Payer: COMMERCIAL

## 2022-07-13 ENCOUNTER — HOSPITAL ENCOUNTER (EMERGENCY)
Facility: CLINIC | Age: 32
Discharge: HOME | End: 2022-07-13
Attending: EMERGENCY MEDICINE | Admitting: EMERGENCY MEDICINE
Payer: COMMERCIAL

## 2022-07-13 VITALS
SYSTOLIC BLOOD PRESSURE: 144 MMHG | OXYGEN SATURATION: 100 % | BODY MASS INDEX: 28.28 KG/M2 | HEART RATE: 81 BPM | WEIGHT: 169.75 LBS | TEMPERATURE: 98.3 F | DIASTOLIC BLOOD PRESSURE: 109 MMHG | HEIGHT: 65 IN | RESPIRATION RATE: 18 BRPM

## 2022-07-13 DIAGNOSIS — R10.30 ABDOMINAL PAIN, LOWER: ICD-10-CM

## 2022-07-13 DIAGNOSIS — N93.8 DYSFUNCTIONAL UTERINE BLEEDING: ICD-10-CM

## 2022-07-13 DIAGNOSIS — D50.0 IRON DEFICIENCY ANEMIA DUE TO CHRONIC BLOOD LOSS: ICD-10-CM

## 2022-07-13 LAB
ABO/RH(D): NORMAL
ALBUMIN UR-MCNC: NEGATIVE MG/DL
ANION GAP SERPL CALCULATED.3IONS-SCNC: 4 MMOL/L (ref 3–14)
ANTIBODY SCREEN: NEGATIVE
APPEARANCE UR: CLEAR
BILIRUB UR QL STRIP: NEGATIVE
BUN SERPL-MCNC: 6 MG/DL (ref 7–30)
CALCIUM SERPL-MCNC: 8.1 MG/DL (ref 8.5–10.1)
CHLORIDE BLD-SCNC: 109 MMOL/L (ref 94–109)
CO2 SERPL-SCNC: 28 MMOL/L (ref 20–32)
COLOR UR AUTO: ABNORMAL
CREAT SERPL-MCNC: 0.52 MG/DL (ref 0.52–1.04)
ERYTHROCYTE [DISTWIDTH] IN BLOOD BY AUTOMATED COUNT: 15.3 % (ref 10–15)
GFR SERPL CREATININE-BSD FRML MDRD: >90 ML/MIN/1.73M2
GLUCOSE BLD-MCNC: 96 MG/DL (ref 70–99)
GLUCOSE UR STRIP-MCNC: NEGATIVE MG/DL
HCG SERPL QL: NEGATIVE
HCT VFR BLD AUTO: 25.5 % (ref 35–47)
HGB BLD-MCNC: 7.9 G/DL (ref 11.7–15.7)
HGB UR QL STRIP: ABNORMAL
HOLD SPECIMEN: NORMAL
HOLD SPECIMEN: NORMAL
KETONES UR STRIP-MCNC: NEGATIVE MG/DL
LEUKOCYTE ESTERASE UR QL STRIP: ABNORMAL
MCH RBC QN AUTO: 26.4 PG (ref 26.5–33)
MCHC RBC AUTO-ENTMCNC: 31 G/DL (ref 31.5–36.5)
MCV RBC AUTO: 85 FL (ref 78–100)
NITRATE UR QL: NEGATIVE
PH UR STRIP: 7.5 [PH] (ref 5–7)
PLATELET # BLD AUTO: 321 10E3/UL (ref 150–450)
POTASSIUM BLD-SCNC: 3.5 MMOL/L (ref 3.4–5.3)
RBC # BLD AUTO: 2.99 10E6/UL (ref 3.8–5.2)
RBC URINE: 82 /HPF
SODIUM SERPL-SCNC: 141 MMOL/L (ref 133–144)
SP GR UR STRIP: 1.01 (ref 1–1.03)
SPECIMEN EXPIRATION DATE: NORMAL
SQUAMOUS EPITHELIAL: <1 /HPF
UROBILINOGEN UR STRIP-MCNC: NORMAL MG/DL
WBC # BLD AUTO: 6.2 10E3/UL (ref 4–11)
WBC URINE: 46 /HPF

## 2022-07-13 PROCEDURE — 93976 VASCULAR STUDY: CPT

## 2022-07-13 PROCEDURE — 36415 COLL VENOUS BLD VENIPUNCTURE: CPT | Performed by: EMERGENCY MEDICINE

## 2022-07-13 PROCEDURE — 85027 COMPLETE CBC AUTOMATED: CPT | Performed by: EMERGENCY MEDICINE

## 2022-07-13 PROCEDURE — 81001 URINALYSIS AUTO W/SCOPE: CPT | Performed by: EMERGENCY MEDICINE

## 2022-07-13 PROCEDURE — 84703 CHORIONIC GONADOTROPIN ASSAY: CPT | Performed by: EMERGENCY MEDICINE

## 2022-07-13 PROCEDURE — 99284 EMERGENCY DEPT VISIT MOD MDM: CPT | Mod: 25

## 2022-07-13 PROCEDURE — 82310 ASSAY OF CALCIUM: CPT | Performed by: EMERGENCY MEDICINE

## 2022-07-13 PROCEDURE — 86850 RBC ANTIBODY SCREEN: CPT | Performed by: EMERGENCY MEDICINE

## 2022-07-13 RX ORDER — MEDROXYPROGESTERONE ACETATE 10 MG
10 TABLET ORAL DAILY
Qty: 10 TABLET | Refills: 0 | Status: SHIPPED | OUTPATIENT
Start: 2022-07-13 | End: 2022-07-18

## 2022-07-13 ASSESSMENT — ENCOUNTER SYMPTOMS
ABDOMINAL PAIN: 1
FEVER: 0
LIGHT-HEADEDNESS: 1
COUGH: 0

## 2022-07-13 NOTE — ED TRIAGE NOTES
ABCs intact. Pt c/o vaginal bleeding since 6/1. Pt was seen on 6/28 at urgent care. Pt was instructed to see her OB/GYN to schedule an ultrasound. Pt states she has been unable to schedule one d/t the MD's appointments being full.

## 2022-07-13 NOTE — ED PROVIDER NOTES
"  History   Chief Complaint:  Vaginal Bleeding     The history is provided by the patient.      Elidia Barnes is a 32 year old female with history of PCOS and severe pre-eclampsia who presents with vaginal bleeding. The patient states that she has developed vaginal bleeding since June 1. She reports that she is also experiencing abdominal pain and describes the pain as \"cramping.\" She adds that her vaginal bleeding was clotted at first but has since turned a more \"purple\" color. The patient notes that she has not had her menstrual cycle in 5 months and denies pregnancy. She notes that when her menstrual cycle does occur, they are usually heavy and lasts 5-7 days. She denies any bleeding anywhere else. She notes she also is experiencing light-headedness but denies syncope. She reports that she was seen on 06/28 at Urgent Care where she was instructed to see her OB/GYN which she does not have. She denies being on birth control for PCOS. She denies fever or cough.    Review of Systems   Constitutional: Negative for fever.   Respiratory: Negative for cough.    Gastrointestinal: Positive for abdominal pain.   Genitourinary: Positive for vaginal bleeding.   Neurological: Positive for light-headedness. Negative for syncope.   All other systems reviewed and are negative.    Allergies:  Blood Transfusion Related (Informational Only)  No Known Allergies    Medications:  Clomiphene    Past Medical History:     H/O severe pre-eclampsia     Social History:  Arrived via private vehicle.  Presents to ED alone.    Physical Exam     Patient Vitals for the past 24 hrs:   BP Temp Temp src Pulse Resp SpO2 Height Weight   07/13/22 1300 (!) 144/109 -- -- 81 -- 100 % -- --   07/13/22 1245 -- -- -- -- -- 100 % -- --   07/13/22 1230 134/82 -- -- 84 -- 100 % -- --   07/13/22 1130 -- -- -- -- -- 100 % -- --   07/13/22 1115 -- -- -- -- -- 100 % -- --   07/13/22 1100 -- -- -- -- -- 100 % -- --   07/13/22 1045 122/85 -- -- 83 -- 100 % -- " "--   07/13/22 0955 134/73 98.3  F (36.8  C) Oral 94 18 99 % 1.651 m (5' 5\") 77 kg (169 lb 12.1 oz)     Physical Exam  General: The patient is alert, in no respiratory distress.    HENT: Mucous membranes moist.    Cardiovascular: Regular rate and rhythm. Good pulses in all four extremities. Normal capillary refill and skin turgor.     Respiratory: Lungs are clear. No nasal flaring. No retractions. No wheezing, no crackles.    Gastrointestinal: Abdomen soft. No guarding, no rebound. No palpable hernias.     Musculoskeletal: No gross deformity.     Skin: No rashes or petechiae.     Neurologic: The patient is alert and oriented x3. GCS 15. No testable cranial nerve deficit. Follows commands with clear and appropriate speech. Gives appropriate answers. Good strength in all extremities. No gross neurologic deficit. Gross sensation intact. Pupils are round and reactive. No meningismus.     Lymphatic: No cervical adenopathy. No lower extremity swelling.    Psychiatric: The patient is non-tearful.    Emergency Department Course     Imaging:  US Pelvic Complete w Transvaginal & Abd/Pel Duplex Limited   Final Result   IMPRESSION: No acute process demonstrated.      LAMINE BONILLA MD            SYSTEM ID:  [id number]        Report per radiology    Laboratory:  Labs Ordered and Resulted from Time of ED Arrival to Time of ED Departure   CBC WITH PLATELETS - Abnormal       Result Value    WBC Count 6.2      RBC Count 2.99 (*)     Hemoglobin 7.9 (*)     Hematocrit 25.5 (*)     MCV 85      MCH 26.4 (*)     MCHC 31.0 (*)     RDW 15.3 (*)     Platelet Count 321     BASIC METABOLIC PANEL - Abnormal    Sodium 141      Potassium 3.5      Chloride 109      Carbon Dioxide (CO2) 28      Anion Gap 4      Urea Nitrogen 6 (*)     Creatinine 0.52      Calcium 8.1 (*)     Glucose 96      GFR Estimate >90     ROUTINE UA WITH MICROSCOPIC - Abnormal    Color Urine Straw      Appearance Urine Clear      Glucose Urine Negative      Bilirubin Urine " Negative      Ketones Urine Negative      Specific Gravity Urine 1.007      Blood Urine Large (*)     pH Urine 7.5 (*)     Protein Albumin Urine Negative      Urobilinogen Urine Normal      Nitrite Urine Negative      Leukocyte Esterase Urine Trace (*)     RBC Urine 82 (*)     WBC Urine 46 (*)     Squamous Epithelials Urine <1     HCG QUALITATIVE PREGNANCY - Normal    hCG Serum Qualitative Negative     TYPE AND SCREEN, ADULT    ABO/RH(D) A POS      Antibody Screen Negative      SPECIMEN EXPIRATION DATE [id number]        Emergency Department Course:     Reviewed:  I reviewed nursing notes, vitals and past medical history    Assessments:  1100 I obtained history and examined the patient as noted above.   1250 I rechecked the patient and explained findings.     Disposition:  The patient was discharged to home.     Impression & Plan     Medical Decision Making:  The patient has a history of polycystic ovarian syndrome and with her irregular.  And absence a period for 5 months I am not too surprised that she is currently having vaginal bleeding.  There is low abdominal pain and I considered appendicitis bowel obstruction diverticulitis amongst other causes but her abdominal exam is benign.  An ultrasound was ordered that looked quite reassuring.  The patient is not pregnant.  Her hemoglobin has fallen to that is likely secondary to the bleeding.  The patient reports the bleeding is already improved but I did start her on Provera and will have her follow-up with OB as an outpatient promptly.  Symptoms return for discussed she is otherwise asymptomatic and was discharged home in good condition.    Diagnosis:    ICD-10-CM    1. Abdominal pain, lower  R10.30    2. Dysfunctional uterine bleeding  N93.8    3. Iron deficiency anemia due to chronic blood loss  D50.0      Discharge Medications:  Discharge Medication List as of 7/13/2022  1:02 PM      START taking these medications    Details   medroxyPROGESTERone (PROVERA) 10  MG tablet Take 1 tablet (10 mg) by mouth daily for 10 days, Disp-10 tablet, R-0, Local Print           Scribe Disclosure:  I, Fan Apodaca, am serving as a scribe at 10:47 AM on 7/13/2022 to document services personally performed by Pedrito Gold MD based on my observations and the provider's statements to me.        Pedrito Gold MD  07/13/22 0401

## 2022-07-13 NOTE — DISCHARGE INSTRUCTIONS
Discharge Instructions  Vaginal Bleeding    You were seen today for unusual vaginal bleeding. Heavy or irregular bleeding may be caused by many different things such as hormone changes, infection, birth control, or cancer. At this time your doctor does not find that your bleeding is a sign of anything dangerous or life-threatening, and you have not lost enough blood to be dangerous.  However, sometimes the signs of serious illness do not show up right away.  If you have new or worse symptoms, you may need to be seen again in the Emergency Department or by your primary doctor.      Return to the Emergency Department if:  You feel lightheaded or faint.  You have a fever of 100.5 degrees or higher.  Your bleeding becomes much heavier than it is now, or if you start passing clots larger than a quarter.  You have severe cramping or abdominal pain.  You have any new or different symptoms.    Treatment:  Motrin , Advil  (ibuprofen) can help relieve cramps and can also decrease bleeding. You may use this according to the directions on the package. Do not use a medicine that you are allergic to, or if your doctor has told you not to use it.  Hormone pills or birth control pills may be used to help control the bleeding. These can cause problems if you have a history of blood clots or stroke, so tell your doctor if you have these problems before you leave.  Iron tablets may be recommended if you have anemia (low blood count.) Iron can cause constipation, so be sure to have plenty of fiber in your diet and let your doctor know if you have problems.  Drinking plenty of fluid is important. Be sure to drink extra water and other healthy drinks, like milk and juice.     Follow-up:  You should be seen by your regular doctor or a gynecologist within 2-3 days, or as directed by your provider here today.  We may have done tests for infection that take time to come back. We should contact you if these show infection that needs  treatment, but be sure to ask your regular doctor to check on them when you are seen.  If you were given a prescription for medicine here today, be sure to read all of the information (including the package insert) that comes with your prescription.  This will include important information about the medicine, its side effects, and any warnings that you need to know about.  The pharmacist who fills the prescription can provide more information and answer questions you may have about the medicine.  If you have questions or concerns that the pharmacist cannot address, please call or return to the Emergency Department.         Opioid Medication Information    Pain medications are among the most commonly prescribed medicines, so we are including this information for all our patients. If you did not receive pain medication or get a prescription for pain medicine, you can ignore it.     You may have been given a prescription for an opioid (narcotic) pain medicine and/or have received a pain medicine while here in the Emergency Department. These medicines can make you drowsy or impaired. You must not drive, operate dangerous equipment, or engage in any other dangerous activities while taking these medications. If you drive while taking these medications, you could be arrested for DUI, or driving under the influence. Do not drink any alcohol while you are taking these medications.     Opioid pain medications can cause addiction. If you have a history of chemical dependency of any type, you are at a higher risk of becoming addicted to pain medications.  Only take these prescribed medications to treat your pain when all other options have been tried. Take it for as short a time and as few doses as possible. Store your pain pills in a secure place, as they are frequently stolen and provide a dangerous opportunity for children or visitors in your house to start abusing these powerful medications. We will not replace any lost or  stolen medicine.  As soon as your pain is better, you should flush all your remaining medication.     Many prescription pain medications contain Tylenol  (acetaminophen), including Vicodin , Tylenol #3 , Norco , Lortab , and Percocet .  You should not take any extra pills of Tylenol  if you are using these prescription medications or you can get very sick.  Do not ever take more than 3000 mg of acetaminophen in any 24 hour period.    All opioids tend to cause constipation. Drink plenty of water and eat foods that have a lot of fiber, such as fruits, vegetables, prune juice, apple juice and high fiber cereal.  Take a laxative if you don t move your bowels at least every other day. Miralax , Milk of Magnesia, Colace , or Senna  can be used to keep you regular.      Remember that you can always come back to the Emergency Department if you are not able to see your regular doctor in the amount of time listed above, if you get any new symptoms, or if there is anything that worries you.

## 2022-07-14 ENCOUNTER — TELEPHONE (OUTPATIENT)
Dept: OBGYN | Facility: CLINIC | Age: 32
End: 2022-07-14

## 2022-07-14 DIAGNOSIS — D64.9 ANEMIA: Primary | ICD-10-CM

## 2022-07-14 NOTE — TELEPHONE ENCOUNTER
Reason for call:  Other   Patient called regarding (reason for call): appointment  Additional comments: Patient seen in ED on 7/13 for bleeding. Per provider, patient needs follow up for anemia. No availability with [facility] for rest of July. Please call patient to advise. Patient prefers female provider.    Phone number to reach patient:  Cell number on file:    Telephone Information:   Mobile [phone]       Best Time:  ANY    Can we leave a detailed message on this number?  YES    Kristine Cintron CMA/ Central Scheduling

## 2022-07-15 RX ORDER — FERROUS SULFATE 325(65) MG
325 TABLET ORAL
Qty: 90 TABLET | Refills: 1 | Status: SHIPPED | OUTPATIENT
Start: 2022-07-15 | End: 2023-03-20

## 2022-07-15 NOTE — TELEPHONE ENCOUNTER
Start oral iron sulfate 325 mg daily.  OK to wait for Monday.    Loren Fuller MD  [facility] Obstetrics and Gynecology

## 2022-07-15 NOTE — TELEPHONE ENCOUNTER
Non-pregnant pt was seen in ED on 7/13/22 for vaginal bleeding lasting over one month. She had amenorrhea prior to this x5 months. Hgb 7.9, nml U/s    Hemoglobin   Date Value Ref Range Status   07/13/2022 7.9 (L) 11.7 - 15.7 g/dL Final   02/27/2019 11.1 (L) 11.7 - 15.7 g/dL Final       She states she did not receive any iron in ED and she is not taking any currently. She does have lightheadedness and feels weak.    She was given Provera, which she is taking, and this has stopped the bleeding.    Added to KT's schedule for Monday.    Is she okay to wait until Monday pending no worsening sx? Should she start oral iron or get order for IV at this point?    Theresa BOBO RN BSN

## 2022-07-18 ENCOUNTER — OFFICE VISIT (OUTPATIENT)
Dept: OBGYN | Facility: CLINIC | Age: 32
End: 2022-07-18
Payer: COMMERCIAL

## 2022-07-18 VITALS
WEIGHT: 166.2 LBS | BODY MASS INDEX: 27.69 KG/M2 | SYSTOLIC BLOOD PRESSURE: 118 MMHG | HEIGHT: 65 IN | DIASTOLIC BLOOD PRESSURE: 70 MMHG

## 2022-07-18 DIAGNOSIS — D50.9 IRON DEFICIENCY ANEMIA, UNSPECIFIED IRON DEFICIENCY ANEMIA TYPE: ICD-10-CM

## 2022-07-18 DIAGNOSIS — E28.2 PCOS (POLYCYSTIC OVARIAN SYNDROME): Primary | ICD-10-CM

## 2022-07-18 LAB
ESTRADIOL SERPL-MCNC: 23 PG/ML
FSH SERPL IRP2-ACNC: 6.4 MIU/ML
MIS SERPL-MCNC: 2.99 NG/ML (ref 0.58–8.1)
PROGEST SERPL-MCNC: 0.1 NG/ML

## 2022-07-18 PROCEDURE — 99000 SPECIMEN HANDLING OFFICE-LAB: CPT | Performed by: FAMILY MEDICINE

## 2022-07-18 PROCEDURE — 82627 DEHYDROEPIANDROSTERONE: CPT | Performed by: FAMILY MEDICINE

## 2022-07-18 PROCEDURE — 83001 ASSAY OF GONADOTROPIN (FSH): CPT | Performed by: FAMILY MEDICINE

## 2022-07-18 PROCEDURE — 84403 ASSAY OF TOTAL TESTOSTERONE: CPT | Performed by: FAMILY MEDICINE

## 2022-07-18 PROCEDURE — 83520 IMMUNOASSAY QUANT NOS NONAB: CPT | Performed by: FAMILY MEDICINE

## 2022-07-18 PROCEDURE — 84144 ASSAY OF PROGESTERONE: CPT | Performed by: FAMILY MEDICINE

## 2022-07-18 PROCEDURE — 82670 ASSAY OF TOTAL ESTRADIOL: CPT | Performed by: FAMILY MEDICINE

## 2022-07-18 PROCEDURE — 82157 ASSAY OF ANDROSTENEDIONE: CPT | Mod: 90 | Performed by: FAMILY MEDICINE

## 2022-07-18 PROCEDURE — 36415 COLL VENOUS BLD VENIPUNCTURE: CPT | Performed by: FAMILY MEDICINE

## 2022-07-18 PROCEDURE — 99214 OFFICE O/P EST MOD 30 MIN: CPT | Performed by: FAMILY MEDICINE

## 2022-07-18 RX ORDER — MEDROXYPROGESTERONE ACETATE 10 MG
10 TABLET ORAL DAILY
Qty: 10 TABLET | Refills: 0 | Status: SHIPPED | OUTPATIENT
Start: 2022-07-18 | End: 2023-03-20

## 2022-07-18 NOTE — PROGRESS NOTES
SUBJECTIVE:  Elidia Barnes is an 32 year old   woman who presents for   gynecology consult for ER follow-up due to heavy bleeding occurring after several months with amenorrhea.   Has a diagnosis of PCOS and has periods irregular sometimes regular 3-6 months straight, but sometimes   Skips a few months.  Was [place].  2021 period occurred, then next time period came was 2021.  , May 10, Radha 15-20 until , was then seen in ER Patient's last menstrual period was 2022. Periods are irregular, lasting   5 days.   Current contraception: none      Past Medical History:   Diagnosis Date     NO ACTIVE PROBLEMS      PIH (pregnancy induced hypertension)      Vaginal delivery 2019          Family History   Problem Relation Age of Onset     Family History Negative Mother        Past Surgical History:   Procedure Laterality Date     NO HISTORY OF SURGERY         Current Outpatient Medications   Medication     ferrous sulfate (FEROSUL) 325 (65 Fe) MG tablet     medroxyPROGESTERone (PROVERA) 10 MG tablet     Prenatal Vit-Fe Fumarate-FA (PRENATAL MULTIVITAMIN PLUS IRON) 27-0.8 MG TABS per tablet     vitamin D3 (CHOLECALCIFEROL) 2000 units tablet     clomiPHENE (CLOMID) 50 MG tablet     No current facility-administered medications for this visit.     Allergies   Allergen Reactions     Blood Transfusion Related (Informational Only) Other (See Comments)     Patient has a history of a clinically significant antibody against RBC antigens.  A delay in compatible RBCs may occur.     No Known Allergies        Social History     Tobacco Use     Smoking status: Never Smoker     Smokeless tobacco: Never Used   Substance Use Topics     Alcohol use: No       Review Of Systems  Ears/Nose/Throat: negative  Respiratory: No shortness of breath, dyspnea on exertion, cough, or hemoptysis  Cardiovascular: negative  Gastrointestinal: negative  Genitourinary:  "negative  Constitutional, HEENT, cardiovascular, pulmonary, GI, , musculoskeletal, neuro, skin, endocrine and psych systems are negative, except as otherwise noted.    OBJECTIVE:  /70   Ht 1.651 m (5' 5\")   Wt 75.4 kg (166 lb 3.2 oz)   LMP 2022   BMI 27.66 kg/m    General appearance: healthy, alert and no distress  Skin: Skin color, texture, turgor normal. No rashes or lesions.  Ears: negative  Nose/Sinuses: Nares normal. Septum midline. Mucosa normal. No drainage or sinus tenderness.  Oropharynx: Lips, mucosa, and tongue normal. Teeth and gums normal.  Neck: Neck supple. No adenopathy. Thyroid symmetric, normal size,, Carotids without bruits.  Lungs: negative, Percussion normal. Good diaphragmatic excursion. Lungs clear  Heart: negative, PMI normal. No lifts, heaves, or thrills. RRR. No murmurs, clicks gallops or rub        ASSESSMENT:  Elidia Barnes is an 32 year old   woman who presents for   gynecology consult for ER follow-up due to heavy bleeding occurring after several months with amenorrhea.   Has a diagnosis of PCOS and has periods irregular sometimes regular 3-6 months straight, but sometimes   Skips a few months.  [place].  2021 period occurred, then next time period came was 2021.  , May 10, Radha 15- until , was then seen in ER   PLAN:  Dx:  1)  Menometrorrhagia with 4 weeks of daily bleeding, normal ultrasound: likely 2/2 PCOS.  She would like to regulate her period currently.   We discussed Oral Contraceptive versus intermittent progesterone (monthly)  She has needed clomid to get pregnant before, would like to try for pregnancy sept or October    Check pcos labs  Would like intermittent progesterone rx called in   Discussed attempting pregnancy with pcos, weight loss     If bleeding occurs again, despite normal ultrasound and treatment with progesterone, will have her come back for endometrial biopsy.     2)  " Anemia:  Hemoglobin 7.9 taking iron 325 mg po daily, ok to increase 3 x daily     Labs were reviewed in Epic   Imaging was reviewed in Epic    Tests and documents were reviewed  Discussion of management or test interpretation       Dr. Mercedez Gustafson, DO    Obstetrics and Gynecology  [facility] [place]

## 2022-07-18 NOTE — NURSING NOTE
"Chief Complaint   Patient presents with     ER F/U     Vaginal bleeding--pelvic US done in ER--trying for pregnancy--history of PCOS       Initial /70   Ht 1.651 m (5' 5\")   Wt 75.4 kg (166 lb 3.2 oz)   LMP 2022   BMI 27.66 kg/m   Estimated body mass index is 27.66 kg/m  as calculated from the following:    Height as of this encounter: 1.651 m (5' 5\").    Weight as of this encounter: 75.4 kg (166 lb 3.2 oz).  BP completed using cuff size: regular    Questioned patient about current smoking habits.  Pt. has never smoked.          The following HM Due: NONE      "

## 2022-07-18 NOTE — PATIENT INSTRUCTIONS
1)  Menometrorrhagia with 4 weeks of daily bleeding, normal ultrasound: likely 2/2 PCOS.  She would like to regulate her period currently.   We discussed Oral Contraceptive versus intermittent progesterone (monthly)  She has needed clomid to get pregnant before, would like to try for pregnancy sept or October    Check pcos labs  Would like intermittent progesterone rx called in   Discussed attempting pregnancy with pcos, weight loss     If bleeding occurs again, despite normal ultrasound and treatment with progesterone, will have her come back for endometrial biopsy.     2)  Anemia:  Hemoglobin 7.9 taking iron 325 mg po daily, ok to increase 3 x daily

## 2022-07-19 LAB — DHEA-S SERPL-MCNC: 106 UG/DL (ref 35–430)

## 2022-07-20 LAB — TESTOST SERPL-MCNC: 16 NG/DL (ref 8–60)

## 2022-08-02 LAB — ANDROST SERPL-MCNC: 1.05 NG/ML

## 2022-09-07 NOTE — PROGRESS NOTES
SUBJECTIVE:  Elidia Barnes is a 32 year old female who comes in with concerns for prolonged menstrual cycle.  States that she has been bleeding since June 1.  She says typically she does have irregular periods and when she does get them they are never this long.  She is noticing dark blood with a lot of clots per patient.  She denies any fatigue or dizziness.  No significant abdominal pain or cramping.  She denies any UTI symptoms or vaginal discharge.  She does have a history of PCOS.  She is concerned possibly anemic and is unsure what to do for her prolonged menstrual cycle.  She is otherwise at baseline health    Past Medical History:   Diagnosis Date     NO ACTIVE PROBLEMS      PIH (pregnancy induced hypertension)      Vaginal delivery 2/28/2019     Current Outpatient Medications   Medication     clomiPHENE (CLOMID) 50 MG tablet     Prenatal Vit-Fe Fumarate-FA (PRENATAL MULTIVITAMIN PLUS IRON) 27-0.8 MG TABS per tablet     vitamin D3 (CHOLECALCIFEROL) 2000 units tablet     ferrous sulfate (FEROSUL) 325 (65 Fe) MG tablet     ferrous sulfate (SLO-FE) 142 (45 Fe) MG CR tablet     medroxyPROGESTERone (PROVERA) 10 MG tablet     No current facility-administered medications for this visit.     Social History     Socioeconomic History     Marital status:      Spouse name: Raleigh Hernandez     Number of children: Not on file     Years of education: Not on file     Highest education level: Not on file   Occupational History     Not on file   Tobacco Use     Smoking status: Never Smoker     Smokeless tobacco: Never Used   Substance and Sexual Activity     Alcohol use: No     Drug use: No     Sexual activity: Yes     Partners: Male     Birth control/protection: None   Other Topics Concern     Parent/sibling w/ CABG, MI or angioplasty before 65F 55M? Not Asked   Social History Narrative     Not on file     Social Determinants of Health     Financial Resource Strain: Not on file   Food Insecurity: Not on file    Transportation Needs: Not on file   Physical Activity: Not on file   Stress: Not on file   Social Connections: Not on file   Intimate Partner Violence: Not on file   Housing Stability: Not on file     Review of systems negative other than stated above    Exam:  GENERAL APPEARANCE: healthy, alert and no distress  EYES: EOMI,  PERRL  RESP: lungs clear to auscultation - no rales, rhonchi or wheezes  CV: regular rates and rhythm, normal S1 S2, no S3 or S4 and no murmur, click or rub -  ABDOMEN:  soft, nontender, no HSM or masses and bowel sounds normal no guarding or rebound tenderness.  There is no CVA tenderness.  GU_female: Patient defers    Results for orders placed or performed in visit on 06/28/22   CBC with platelets and differential     Status: Abnormal   Result Value Ref Range    WBC Count 5.6 4.0 - 11.0 10e3/uL    RBC Count 4.03 3.80 - 5.20 10e6/uL    Hemoglobin 11.1 (L) 11.7 - 15.7 g/dL    Hematocrit 33.5 (L) 35.0 - 47.0 %    MCV 83 78 - 100 fL    MCH 27.5 26.5 - 33.0 pg    MCHC 33.1 31.5 - 36.5 g/dL    RDW 15.0 10.0 - 15.0 %    Platelet Count 315 150 - 450 10e3/uL    % Neutrophils 47 %    % Lymphocytes 39 %    % Monocytes 13 %    % Eosinophils 1 %    % Basophils 0 %    Absolute Neutrophils 2.6 1.6 - 8.3 10e3/uL    Absolute Lymphocytes 2.2 0.8 - 5.3 10e3/uL    Absolute Monocytes 0.7 0.0 - 1.3 10e3/uL    Absolute Eosinophils 0.1 0.0 - 0.7 10e3/uL    Absolute Basophils 0.0 0.0 - 0.2 10e3/uL   CBC with platelets and differential     Status: Abnormal    Narrative    The following orders were created for panel order CBC with platelets and differential.  Procedure                               Abnormality         Status                     ---------                               -----------         ------                     CBC with platelets and d...[[id number]]  Abnormal            Final result                 Please view results for these tests on the individual orders.     assessment/plan:  (N92.1)  Menorrhagia with irregular cycle  (primary encounter diagnosis)  Comment:   Plan: CBC with platelets and differential          Patient with prolonged menstrual cycle that has been present for almost 1 month.  WBC was performed and patient is very slightly anemic at this point.  Advised that she start taking over-the-counter iron in diet was discussed.  Informed patient that she really does need to speak to her OB/GYN due to her history of PCOS and prolonged periods.  Advised that she may need to consider having an ultrasound performed but that has not done through the urgent care setting will need to contact either her primary or her OB.  Patient is stable for at this time.  She is to follow-up as needed Red flag signs were discussed.  Patient notes understanding is in agreement with above plan

## 2023-01-26 DIAGNOSIS — E28.2 PCOS (POLYCYSTIC OVARIAN SYNDROME): ICD-10-CM

## 2023-01-26 RX ORDER — CLOMIPHENE CITRATE 50 MG/1
50 TABLET ORAL SEE ADMIN INSTRUCTIONS
Qty: 30 TABLET | Refills: 0 | Status: SHIPPED | OUTPATIENT
Start: 2023-01-26 | End: 2023-03-20

## 2023-01-26 NOTE — TELEPHONE ENCOUNTER
Patient calling for refill of clomid.  Took clomid in Dec.   LMP 23.  Last period history 10/2/22, 22  Would like refill. (RX is )    States OPK do show ovulation.    Med t'd up.    Scarlett Lambert, RN

## 2023-02-21 ENCOUNTER — APPOINTMENT (OUTPATIENT)
Dept: INTERPRETER SERVICES | Facility: CLINIC | Age: 33
End: 2023-02-21
Payer: COMMERCIAL

## 2023-02-21 ENCOUNTER — OFFICE VISIT (OUTPATIENT)
Dept: FAMILY MEDICINE | Facility: CLINIC | Age: 33
End: 2023-02-21
Payer: COMMERCIAL

## 2023-02-21 VITALS
RESPIRATION RATE: 12 BRPM | OXYGEN SATURATION: 99 % | DIASTOLIC BLOOD PRESSURE: 60 MMHG | TEMPERATURE: 98.1 F | HEART RATE: 95 BPM | WEIGHT: 114.5 LBS | SYSTOLIC BLOOD PRESSURE: 100 MMHG

## 2023-02-21 DIAGNOSIS — M54.42 ACUTE BILATERAL LOW BACK PAIN WITH LEFT-SIDED SCIATICA: ICD-10-CM

## 2023-02-21 DIAGNOSIS — N64.4 BREAST PAIN: Primary | ICD-10-CM

## 2023-02-21 PROCEDURE — 99203 OFFICE O/P NEW LOW 30 MIN: CPT

## 2023-02-21 ASSESSMENT — PAIN SCALES - GENERAL: PAINLEVEL: NO PAIN (0)

## 2023-02-21 NOTE — PROGRESS NOTES
Assessment & Plan     (N64.4) Breast pain  (primary encounter diagnosis)  Comment: would like to get Mammogram to rule in/out breast cancer or other insidious etiologies. Patient pain could possibly be Justin's ligament pain given location. Suggested Conservative management with OTC therapies (see patient instructions). Patient agreeable with plan of care and at this point patient will follow up as needed unless acute concerns arise in the meantime.  Plan: MA Screen Bilateral w/Dutch    (M54.42) Acute bilateral low back pain with left-sided sciatica  Comment: patient pain seems musculoskeletal in nature with sciatic  Nerve compression involvement. Would like to try conservative measures to start. Patient deferred PT at this time but recommended this if there is no improvement over the next 4-6 weeks. At that point would like to follow up in person. Patient agreeable with plan of care and at this point patient will follow up as needed unless acute concerns arise in the meantime.  Plan: see above.           See above    No follow-ups on file.    RICARDO Brown [facility]  Jaime Pina is a 33 year old, presenting for the following health issues:  Breast Problem      History of Present Illness       Reason for visit:  I have pain in both m breasts.    She eats 2-3 servings of fruits and vegetables daily.She consumes 1 sweetened beverage(s) daily.She exercises with enough effort to increase her heart rate 9 or less minutes per day.  She exercises with enough effort to increase her heart rate 3 or less days per week.   She is taking medications regularly.     Breast Pain  -Pain does not happen at same time  -There is some itching, pins and needles sometimes  -Pain is more noticed when wearing a bra  -Only bra that helps is a sports bra, but not wearing a bra helps most.  -Pain mostly on top, sometimes on bottom, and around nipples- seems to be diffuse   -No noticeable lumps or  unusual masses   -Stopped breastfeeding 6 months ago  -Stays well hydrated, eats very healthy, plenty of fruits and veggies   -No fevers, chills, or body aches    Back Pain  -Noticed it a couple weeks ago  -Pain bilaterally with radiation down left leg causing some tingling in later and medial feet  -Has 5 children whom she regularly carries   -Patient does not recall specific event that may have caused this.       30 minutes spent on visit/counseling, chart review, documentation, and other chart related activities.     Review of Systems   Constitutional GI, , musculoskeletal, neuro, skin, endocrine and psych systems are negative, except as otherwise noted.      Objective    There were no vitals taken for this visit.  There is no height or weight on file to calculate BMI.  Physical Exam  Vitals and nursing note reviewed. Exam conducted with a chaperone present.   Constitutional:       General: She is not in acute distress.     Appearance: Normal appearance. She is normal weight. She is not ill-appearing.   Cardiovascular:      Rate and Rhythm: Normal rate.   Pulmonary:      Effort: Pulmonary effort is normal.   Chest:      Chest wall: No mass, swelling or tenderness.   Breasts:     Right: Tenderness present. No swelling, bleeding, inverted nipple, mass, nipple discharge or skin change.      Left: Tenderness present. No swelling, bleeding, inverted nipple, mass, nipple discharge or skin change.          Comments: Tenderness to palpation along highlighted areas. Some small round soft nodular areas noted on palpation.   Musculoskeletal:      Cervical back: Normal.      Thoracic back: Normal.      Lumbar back: Spasms and tenderness present. No bony tenderness. Normal range of motion. Negative right straight leg raise test and negative left straight leg raise test.   Lymphadenopathy:      Upper Body:      Right upper body: No supraclavicular, axillary or pectoral adenopathy.      Left upper body: No supraclavicular,  axillary or pectoral adenopathy.   Skin:     General: Skin is warm and dry.      Findings: No rash.   Neurological:      Mental Status: She is alert.   Psychiatric:         Mood and Affect: Mood normal.         Behavior: Behavior normal.         Thought Content: Thought content normal.         Judgment: Judgment normal.

## 2023-02-21 NOTE — PATIENT INSTRUCTIONS
Mammogram  -ice, heat, ibuprofen (600 mg 6-8 hrs), tylenol (1000 mg three times a day)    Back pain  -ice, heat, ibuprofen (600 mg 6-8 hrs), tylenol (1000 mg three times a day), topical analgesics (Biofreeze, icy hot)  -if not improving in 4-6 weeks follow up with provider and possible imaging and PT

## 2023-03-07 ENCOUNTER — DOCUMENTATION ONLY (OUTPATIENT)
Dept: LAB | Facility: CLINIC | Age: 33
End: 2023-03-07
Payer: COMMERCIAL

## 2023-03-07 DIAGNOSIS — Z32.01 PREGNANCY TEST POSITIVE: Primary | ICD-10-CM

## 2023-03-08 ENCOUNTER — HOSPITAL ENCOUNTER (OUTPATIENT)
Dept: MAMMOGRAPHY | Facility: CLINIC | Age: 33
Discharge: HOME | End: 2023-03-08
Payer: COMMERCIAL

## 2023-03-08 ENCOUNTER — HOSPITAL ENCOUNTER (OUTPATIENT)
Dept: ULTRASOUND IMAGING | Facility: CLINIC | Age: 33
Discharge: HOME | End: 2023-03-08
Payer: COMMERCIAL

## 2023-03-08 DIAGNOSIS — N64.4 BREAST PAIN: ICD-10-CM

## 2023-03-08 PROCEDURE — 76642 ULTRASOUND BREAST LIMITED: CPT | Mod: 50

## 2023-03-08 PROCEDURE — 77066 DX MAMMO INCL CAD BI: CPT

## 2023-03-08 NOTE — PROGRESS NOTES
Orders placed   Dr. Mercedez Gustafson, DO    Obstetrics and Gynecology  [facility] [place] [facility] [place]

## 2023-03-20 ENCOUNTER — PRENATAL OFFICE VISIT (OUTPATIENT)
Dept: NURSING | Facility: CLINIC | Age: 33
End: 2023-03-20
Payer: COMMERCIAL

## 2023-03-20 DIAGNOSIS — Z34.80 PRENATAL CARE, SUBSEQUENT PREGNANCY, UNSPECIFIED TRIMESTER: Primary | ICD-10-CM

## 2023-03-20 PROCEDURE — 99207 PR NO CHARGE NURSE ONLY: CPT

## 2023-03-20 NOTE — PROGRESS NOTES
NPN nurse visit done over the phone. Pt will be given NPN folder and book at her upcoming appt.   Discussed optional screening available to assess chromosomal anomalies. Questions answered. Pt advised to call the clinic if she has any questions or concerns related to her pregnancy. Prenatal labs will be obtained at her upcoming appt. New prenatal visit scheduled on 23 with Dr Gustafson.    7w4d    Lab Results   Component Value Date    PAP NIL 2018           Patient supplied answers from flow sheet for:  Prenatal OB Questionnaire.  Past Medical History  Have you ever recieved care for your mental health? : No  Have you ever been in a major accident or suffered serious trauma?: No  Within the last year, has anyone hit, slapped, kicked or otherwise hurt you?: No  In the last year, has anyone forced you to have sex when you didn't want to?: No    Past Medical History 2   Have you ever received a blood transfusion?: No  Would you accept a blood transfusion if was medically recommended?: Yes  Does anyone in your home smoke?: No   Is your blood type Rh negative?: No  Have you ever ?: (!) Yes  Have you been hospitalized for a nonsurgical reason excluding normal delivery?: No  Have you ever had an abnormal pap smear?: No    Past Medical History (Continued)  Do you have a history of abnormalities of the uterus?: No  Did your mother take ELLE or any other hormones when she was pregnant with you?: No  Do you have any other problems we have not asked about which you feel may be important to this pregnancy?: No

## 2023-03-26 LAB
ABO/RH(D): NORMAL
ANTIBODY SCREEN: NEGATIVE
SPECIMEN EXPIRATION DATE: NORMAL

## 2023-03-27 ENCOUNTER — ANCILLARY PROCEDURE (OUTPATIENT)
Dept: ULTRASOUND IMAGING | Facility: CLINIC | Age: 33
End: 2023-03-27
Payer: COMMERCIAL

## 2023-03-27 ENCOUNTER — LAB (OUTPATIENT)
Dept: LAB | Facility: CLINIC | Age: 33
End: 2023-03-27
Payer: COMMERCIAL

## 2023-03-27 DIAGNOSIS — Z34.80 PRENATAL CARE, SUBSEQUENT PREGNANCY, UNSPECIFIED TRIMESTER: ICD-10-CM

## 2023-03-27 LAB
ERYTHROCYTE [DISTWIDTH] IN BLOOD BY AUTOMATED COUNT: 16.8 % (ref 10–15)
HCT VFR BLD AUTO: 33.4 % (ref 35–47)
HGB BLD-MCNC: 11.2 G/DL (ref 11.7–15.7)
MCH RBC QN AUTO: 27.5 PG (ref 26.5–33)
MCHC RBC AUTO-ENTMCNC: 33.5 G/DL (ref 31.5–36.5)
MCV RBC AUTO: 82 FL (ref 78–100)
PLATELET # BLD AUTO: 323 10E3/UL (ref 150–450)
RBC # BLD AUTO: 4.07 10E6/UL (ref 3.8–5.2)
WBC # BLD AUTO: 6.2 10E3/UL (ref 4–11)

## 2023-03-27 PROCEDURE — 87340 HEPATITIS B SURFACE AG IA: CPT

## 2023-03-27 PROCEDURE — 86901 BLOOD TYPING SEROLOGIC RH(D): CPT

## 2023-03-27 PROCEDURE — 86780 TREPONEMA PALLIDUM: CPT

## 2023-03-27 PROCEDURE — 86900 BLOOD TYPING SEROLOGIC ABO: CPT

## 2023-03-27 PROCEDURE — 36415 COLL VENOUS BLD VENIPUNCTURE: CPT

## 2023-03-27 PROCEDURE — 87086 URINE CULTURE/COLONY COUNT: CPT

## 2023-03-27 PROCEDURE — 86762 RUBELLA ANTIBODY: CPT

## 2023-03-27 PROCEDURE — 76801 OB US < 14 WKS SINGLE FETUS: CPT | Performed by: OBSTETRICS & GYNECOLOGY

## 2023-03-27 PROCEDURE — 86803 HEPATITIS C AB TEST: CPT

## 2023-03-27 PROCEDURE — 85027 COMPLETE CBC AUTOMATED: CPT

## 2023-03-27 PROCEDURE — 86850 RBC ANTIBODY SCREEN: CPT

## 2023-03-27 PROCEDURE — 87389 HIV-1 AG W/HIV-1&-2 AB AG IA: CPT

## 2023-03-28 LAB
HBV SURFACE AG SERPL QL IA: NONREACTIVE
HCV AB SERPL QL IA: NONREACTIVE
HIV 1+2 AB+HIV1 P24 AG SERPL QL IA: NONREACTIVE
RUBV IGG SERPL QL IA: 8.7 INDEX
RUBV IGG SERPL QL IA: POSITIVE
T PALLIDUM AB SER QL: NONREACTIVE

## 2023-03-29 LAB — BACTERIA UR CULT: NO GROWTH

## 2023-04-13 ENCOUNTER — TRANSCRIBE ORDERS (OUTPATIENT)
Dept: MATERNAL FETAL MEDICINE | Facility: CLINIC | Age: 33
End: 2023-04-13

## 2023-04-13 ENCOUNTER — PRENATAL OFFICE VISIT (OUTPATIENT)
Dept: OBGYN | Facility: CLINIC | Age: 33
End: 2023-04-13
Payer: COMMERCIAL

## 2023-04-13 VITALS
BODY MASS INDEX: 25.46 KG/M2 | SYSTOLIC BLOOD PRESSURE: 106 MMHG | WEIGHT: 152.8 LBS | DIASTOLIC BLOOD PRESSURE: 60 MMHG | HEIGHT: 65 IN

## 2023-04-13 DIAGNOSIS — O26.90 PREGNANCY RELATED CONDITION, ANTEPARTUM: Primary | ICD-10-CM

## 2023-04-13 DIAGNOSIS — Z87.59 HISTORY OF IUFD: ICD-10-CM

## 2023-04-13 DIAGNOSIS — D50.9 IRON DEFICIENCY ANEMIA, UNSPECIFIED IRON DEFICIENCY ANEMIA TYPE: ICD-10-CM

## 2023-04-13 DIAGNOSIS — O09.291 HISTORY OF PRE-ECLAMPSIA IN PRIOR PREGNANCY, CURRENTLY PREGNANT IN FIRST TRIMESTER: Primary | ICD-10-CM

## 2023-04-13 LAB
ALBUMIN MFR UR ELPH: 19.9 MG/DL (ref 1–14)
ALBUMIN SERPL BCG-MCNC: 4 G/DL (ref 3.5–5.2)
ALP SERPL-CCNC: 67 U/L (ref 35–104)
ALT SERPL W P-5'-P-CCNC: 14 U/L (ref 10–35)
ANION GAP SERPL CALCULATED.3IONS-SCNC: 12 MMOL/L (ref 7–15)
AST SERPL W P-5'-P-CCNC: 25 U/L (ref 10–35)
BILIRUB SERPL-MCNC: 0.3 MG/DL
BUN SERPL-MCNC: 8.3 MG/DL (ref 6–20)
CALCIUM SERPL-MCNC: 9.3 MG/DL (ref 8.6–10)
CHLORIDE SERPL-SCNC: 101 MMOL/L (ref 98–107)
CREAT SERPL-MCNC: 0.56 MG/DL (ref 0.51–0.95)
CREAT UR-MCNC: 230 MG/DL
DEPRECATED HCO3 PLAS-SCNC: 21 MMOL/L (ref 22–29)
ERYTHROCYTE [DISTWIDTH] IN BLOOD BY AUTOMATED COUNT: 17.5 % (ref 10–15)
GFR SERPL CREATININE-BSD FRML MDRD: >90 ML/MIN/1.73M2
GLUCOSE SERPL-MCNC: 88 MG/DL (ref 70–99)
HCT VFR BLD AUTO: 32.5 % (ref 35–47)
HGB BLD-MCNC: 10.8 G/DL (ref 11.7–15.7)
LDH SERPL L TO P-CCNC: 209 U/L (ref 0–250)
MCH RBC QN AUTO: 27.8 PG (ref 26.5–33)
MCHC RBC AUTO-ENTMCNC: 33.2 G/DL (ref 31.5–36.5)
MCV RBC AUTO: 84 FL (ref 78–100)
PLATELET # BLD AUTO: 321 10E3/UL (ref 150–450)
POTASSIUM SERPL-SCNC: 4 MMOL/L (ref 3.4–5.3)
PROT SERPL-MCNC: 7.9 G/DL (ref 6.4–8.3)
PROT/CREAT 24H UR: 0.09 MG/MG CR (ref 0–0.2)
RBC # BLD AUTO: 3.89 10E6/UL (ref 3.8–5.2)
SODIUM SERPL-SCNC: 134 MMOL/L (ref 136–145)
URATE SERPL-MCNC: 2.9 MG/DL (ref 2.4–5.7)
WBC # BLD AUTO: 6 10E3/UL (ref 4–11)

## 2023-04-13 PROCEDURE — 99207 PR FIRST OB VISIT: CPT | Performed by: FAMILY MEDICINE

## 2023-04-13 PROCEDURE — G0145 SCR C/V CYTO,THINLAYER,RESCR: HCPCS | Performed by: FAMILY MEDICINE

## 2023-04-13 PROCEDURE — 85027 COMPLETE CBC AUTOMATED: CPT | Performed by: FAMILY MEDICINE

## 2023-04-13 PROCEDURE — 36415 COLL VENOUS BLD VENIPUNCTURE: CPT | Performed by: FAMILY MEDICINE

## 2023-04-13 PROCEDURE — 80053 COMPREHEN METABOLIC PANEL: CPT | Performed by: FAMILY MEDICINE

## 2023-04-13 PROCEDURE — 87591 N.GONORRHOEAE DNA AMP PROB: CPT | Performed by: FAMILY MEDICINE

## 2023-04-13 PROCEDURE — 84550 ASSAY OF BLOOD/URIC ACID: CPT | Performed by: FAMILY MEDICINE

## 2023-04-13 PROCEDURE — 87491 CHLMYD TRACH DNA AMP PROBE: CPT | Performed by: FAMILY MEDICINE

## 2023-04-13 PROCEDURE — 87624 HPV HI-RISK TYP POOLED RSLT: CPT | Performed by: FAMILY MEDICINE

## 2023-04-13 PROCEDURE — 83615 LACTATE (LD) (LDH) ENZYME: CPT | Performed by: FAMILY MEDICINE

## 2023-04-13 PROCEDURE — 84156 ASSAY OF PROTEIN URINE: CPT | Performed by: FAMILY MEDICINE

## 2023-04-13 NOTE — PROGRESS NOTES
Elidia Barnes is a 33 year old  @ 11w0d wks EGA with 2023 who presents to the clinic for an new ob visit.         Estimated Date of Delivery: 2023  Reviewed nurse intake visit on previously  H/o Chicken Pox or Varicella Vaccination: Yes     History of GDM: no   Hx PTL : no   History of HTN in pregnancy: 1st pregnancy, 2nd pregnancy    Shoulder dystocia: no   Vacuum Extraction: no   PPH: no   3rd of 4th degree laceration: no   Other complications: no     PERSONAL HISTORY  Exercise Habits:  normal  Employment: office work  Her job involves no activity with no potential for toxic exposure.    Travel plans: no  Diet: no  Prenatal vitamins? yes  Fish Oil? yes  Abuse concerns? no  Any history if abuse, varbal, physical, sexual? no    Hgb A1c screen:  BMI > 30: no, 1st degree family DM: no, History of GDM: no, PCOS: no, High risk ethnicity: no     Low Dose Aspirin for Preeclampsia Prevention:  Consider for those with 1 high risk or 2  moderate risk factors     High risk: previous pregnancy with preeclampsia, multifetal gestation, chronic htn, diabetes, chronic kidney disease, autoimmune disorder     Medium risk: nulliparity, BMI >30, family hx preeclampsia, age >/= 35,  race, low SES, personal risk factors (hx low birth weight, hx stillbirth, >10yrs between pregnancies).   Teenage pregnancy.       Patient does qualify for low dose aspirin therapy     Patient Active Problem List   Diagnosis     H/O severe pre-eclampsia     Past Medical History:   Diagnosis Date     History of anemia      History of severe pre-eclampsia      PCOS (polycystic ovarian syndrome)      PIH (pregnancy induced hypertension)      Vaginal delivery 2019     Past Surgical History:   Procedure Laterality Date     NO HISTORY OF SURGERY  2023     Current Outpatient Medications   Medication Sig Dispense Refill     Prenatal Vit-Fe Fumarate-FA (PRENATAL MULTIVITAMIN PLUS IRON) 27-0.8 MG TABS per  "tablet Take 1 tablet by mouth daily           ====================================================  PERSONAL/SOCIAL HISTORY  Social History     Socioeconomic History     Marital status:      Spouse name: Raleigh Hernandez     Number of children: None     Years of education: None     Highest education level: None   Occupational History     Occupation: Office work   Tobacco Use     Smoking status: Never     Smokeless tobacco: Never   Vaping Use     Vaping status: Never Used   Substance and Sexual Activity     Alcohol use: No     Drug use: No     Sexual activity: Yes     Partners: Male     Birth control/protection: None     =====================================================   REVIEW OF SYSTEMS  ENT: NEGATIVE for ear, mouth and throat problems  CV: NEGATIVE for chest pain, palpitations or peripheral edema  GI: NEGATIVE for nausea, abdominal pain, heartburn, or change in bowel habits  : NEGATIVE for unusual urinary or vaginal symptoms. Periods are regular.  C: NEGATIVE for fever, chills, change in weight  I: NEGATIVE for worrisome rashes, moles or lesions  E: NEGATIVE for vision changes or irritation  R: NEGATIVE for significant cough or SOB  B: NEGATIVE for masses, tenderness or discharge  M: NEGATIVE for significant arthralgias or myalgia  N: NEGATIVE for weakness, dizziness or paresthesias  P: NEGATIVE for changes in mood or affect  ====================================================  PHYSICAL EXAM:  /60   Ht 1.651 m (5' 5\")   Wt 69.3 kg (152 lb 12.8 oz)   LMP 01/26/2023 (Exact Date)   BMI 25.43 kg/m          GENERAL:  Pleasant pregnant female, alert, well groomed.  SKIN:  Warm and dry, without lesions or rashes  HEAD: Symmetrical features.  EYES:  PERRLA,   MOUTH:  Buccal mucosa pink, moist without lesions.    NECK:  Thyroid without enlargement and nodules.  Lymph nodes not palpable.   LUNGS:  Clear to auscultation.  BREAST:  Symmetrical.  No dominant, fixed or suspicious masses are noted.  No " skin or nipple changes or axillary nodes.  Self exam is taught and encouraged.  Nipples everted.      HEART:  RRR without murmur.  ABDOMEN: Soft without masses , tenderness or organomegaly.  No CVA tenderness. No scars noted.. FHT 150s  MUSCULOSKELETAL:  Full range of motion  EXTREMITIES:  No edema. No significant varicosities.   GENITALIA:  BUS WNL, no lesions noted   VAGINA:  Pink, normal rugae and discharge normal and physiologic,   CERVIX:  smooth, without discharge or CMT and parous os,   firm/ closed 4 cm long.  UTERUS: Anteverted, nontender 12 weeks in size.  ADNEXA: Without masses or tenderness  PELVIS:   Adequate, Pelvis proven to 7 pounds 4 ounces.    =========================================  ICSI Routine Prenatal Carfe Guideline  ASSESSMENT/PLAN:  Elidia Barnes is a 33 year old  @ 11w0d  wks EGA with EDC Estimated Date of Delivery: 2023 who presents to the clinic for an new ob visit.        1) Concerns: none    Nausea yes, occurring with breaking her fast, and with prenatal vitamin   Covid-19 concerns: covid infection and vaccination recommendations discussed.   2) Routine: Blood type A+ RI tdap [ ] flu [x] GS [dec] Pre-parent [ ]   Covid v [x] GTT [ ]   3) Risk factors:   A: History of pre-eclampsia with IUFD @ 28 weeks:  Baby aspirin ordered, baseline labs today    MFM consult ordered    Previously ob visits q 2 weeks, growth 4 weeks, and weekly bpp 28 weeks   B: Anemia: slo-fe ordered   4) Problem list   Patient Active Problem List   Diagnosis     H/O severe pre-eclampsia     5) EDUCATION :    RECOMMENDED WEIGHT GAIN: 25-35 lbs.  Instructed on best evidence for: weight gain for her BMI for pregnancy; healthy diet and foods to avoid; exercise and activity during pregnancy;avoiding exposure to toxoplasmosis; and maintenance of a generally healthy lifestyle.   Discussed the harms, benefits, side effects and alternative therapies for current prescribed and OTC medications: patient  previously given list of recommended medications.  6) Counseled about genetic screening tests (Innatal, preparent with options, discussed standard panel and other options, 1st trimester screen and targeted anatomy scan and AFP and quad screen if first trimester screening not done) and invasive definitive testing (chorionic villus sampling and genetic amniocentesis).  Patient wishes to undergo anatomy scan and pre-parent    7) Return: 2 weeks     Dr. Mercedez Gustafson, DO    OB/GYN   [facility]

## 2023-04-13 NOTE — PATIENT INSTRUCTIONS
"Return 2 weeks   Return to clinic:  every 4 weeks till 28 weeks, then every 2 weeks till 36 weeks, then weekly till delivery      Phone numbers [place]:  Day/ night [phone] ask for ob triage  Emergency:  Call labor and delivery:  [phone]    What should I call about??    Contraction every 5 minutes for 1 hour 1 minute long (511), bleeding, loss of fluid, headache that doesn't resolve with tylenol, and decreased fetal movement     Start kick counts @ 26-28 weeks   There is an hamzah for this!  It is called \"count the kicks\"  Keep track of movement and discover your normal baby movement pattern   guideline is listed below  Please call if you do not feel the baby move!  We will have you come in for fetal heart rate monitoring:   Perception of at least 10 FMs during 12 hours of normal maternal activity   Perception of least 10 FMs over two hours when the mother is at rest and focused on [facility] Address   [address], [place], [place] [address] [phone]    Dr. Mercedez Gustafson, DO    OB/GYN   [facility] and [facility]                                                    "

## 2023-04-13 NOTE — NURSING NOTE
"11w0d    Chief Complaint   Patient presents with     Prenatal Care     NPN-pap due--GC due--having nausea and constipation--her prenatal vitamin makes her nausea--big pills       Initial /60   Ht 1.651 m (5' 5\")   Wt 69.3 kg (152 lb 12.8 oz)   LMP 2023 (Exact Date)   BMI 25.43 kg/m   Estimated body mass index is 25.43 kg/m  as calculated from the following:    Height as of this encounter: 1.651 m (5' 5\").    Weight as of this encounter: 69.3 kg (152 lb 12.8 oz).  BP completed using cuff size: regular    Questioned patient about current smoking habits.  Pt. has never smoked.          The following  Due: NONE    Clarisa Rhodes CMA             "

## 2023-04-14 LAB
C TRACH DNA SPEC QL NAA+PROBE: NEGATIVE
N GONORRHOEA DNA SPEC QL NAA+PROBE: NEGATIVE

## 2023-04-18 LAB
BKR LAB AP GYN ADEQUACY: NORMAL
BKR LAB AP GYN INTERPRETATION: NORMAL
BKR LAB AP HPV REFLEX: NORMAL
BKR LAB AP PREVIOUS ABNORMAL: NORMAL
PATH REPORT.COMMENTS IMP SPEC: NORMAL
PATH REPORT.COMMENTS IMP SPEC: NORMAL
PATH REPORT.RELEVANT HX SPEC: NORMAL

## 2023-04-20 LAB
HUMAN PAPILLOMA VIRUS 16 DNA: NEGATIVE
HUMAN PAPILLOMA VIRUS 18 DNA: NEGATIVE
HUMAN PAPILLOMA VIRUS FINAL DIAGNOSIS: NORMAL
HUMAN PAPILLOMA VIRUS OTHER HR: NEGATIVE

## 2023-05-05 ENCOUNTER — PRENATAL OFFICE VISIT (OUTPATIENT)
Dept: OBGYN | Facility: CLINIC | Age: 33
End: 2023-05-05
Payer: COMMERCIAL

## 2023-05-05 VITALS
SYSTOLIC BLOOD PRESSURE: 104 MMHG | HEIGHT: 65 IN | BODY MASS INDEX: 26.08 KG/M2 | WEIGHT: 156.5 LBS | DIASTOLIC BLOOD PRESSURE: 62 MMHG

## 2023-05-05 DIAGNOSIS — O09.292 HISTORY OF PRE-ECLAMPSIA IN PRIOR PREGNANCY, CURRENTLY PREGNANT IN SECOND TRIMESTER: Primary | ICD-10-CM

## 2023-05-05 DIAGNOSIS — Z34.92 PRENATAL CARE IN SECOND TRIMESTER: ICD-10-CM

## 2023-05-05 PROCEDURE — 99207 PR PRENATAL VISIT: CPT | Performed by: FAMILY MEDICINE

## 2023-05-05 RX ORDER — METAPROTERENOL SULFATE 10 MG
500 TABLET ORAL DAILY
Qty: 30 CAPSULE | Refills: 11 | Status: SHIPPED | OUTPATIENT
Start: 2023-05-05 | End: 2023-10-16

## 2023-05-05 NOTE — PROGRESS NOTES
"CC: Here for routine prenatal visit   33 year old y/o  @ 14w1d with Estimated Date of Delivery: 2023     /62   Ht 1.651 m (5' 5\")   Wt 71 kg (156 lb 8 oz)   LMP 2023 (Exact Date)   BMI 26.04 kg/m    See OB flowsheet  + fetal movement, no contractions, no bleeding, no loss of fluid   Discussed monitoring fetal movement     1) concerns: rx prenatal given   2) Routine: Blood type A+ RI tdap [ ] flu [x] GS [dec] Pre-parent [ ]   Covid v [x] GTT [ ]   3) Risk factors:   A: History of pre-eclampsia with IUFD @ 28 weeks:  Baby aspirin ordered, baseline labs today               MFM consult previously ordered               Previously ob visits q 2 weeks, growth 4 weeks, and weekly bpp 28 weeks   B: Anemia: slo-fe ordered     4) Return: 2 weeks   Tillboos     "

## 2023-05-05 NOTE — PATIENT INSTRUCTIONS
"Return 2 weeks   Return to clinic:  every 4 weeks till 28 weeks, then every 2 weeks till 36 weeks, then weekly till delivery      Phone numbers [place]:  Day/ night [phone] ask for ob triage  Emergency:  Call labor and delivery:  [phone]    What should I call about??    Contraction every 5 minutes for 1 hour 1 minute long (511), bleeding, loss of fluid, headache that doesn't resolve with tylenol, and decreased fetal movement     Start kick counts @ 26-28 weeks   There is an hamzah for this!  It is called \"count the kicks\"  Keep track of movement and discover your normal baby movement pattern   guideline is listed below  Please call if you do not feel the baby move!  We will have you come in for fetal heart rate monitoring:   Perception of at least 10 FMs during 12 hours of normal maternal activity   Perception of least 10 FMs over two hours when the mother is at rest and focused on [facility] Address   [address], [place], [place] [address] [phone]    Dr. Mercedez Gustafson, DO    OB/GYN   [facility] and [facility]                                                    "

## 2023-05-19 ENCOUNTER — PRENATAL OFFICE VISIT (OUTPATIENT)
Dept: OBGYN | Facility: CLINIC | Age: 33
End: 2023-05-19
Payer: COMMERCIAL

## 2023-05-19 VITALS — DIASTOLIC BLOOD PRESSURE: 62 MMHG | WEIGHT: 160.7 LBS | BODY MASS INDEX: 26.74 KG/M2 | SYSTOLIC BLOOD PRESSURE: 98 MMHG

## 2023-05-19 DIAGNOSIS — O09.292 HISTORY OF PRE-ECLAMPSIA IN PRIOR PREGNANCY, CURRENTLY PREGNANT IN SECOND TRIMESTER: ICD-10-CM

## 2023-05-19 PROCEDURE — 99207 PR PRENATAL VISIT: CPT | Performed by: FAMILY MEDICINE

## 2023-05-19 NOTE — PROGRESS NOTES
CC: Here for routine prenatal visit   33 year old y/o  @ 16w1d with Estimated Date of Delivery: 2023     BP 98/62 (BP Location: Left arm, Cuff Size: Adult Regular)   Wt 72.9 kg (160 lb 11.2 oz)   LMP 2023 (Exact Date)   BMI 26.74 kg/m    See OB flowsheet  + fetal movement, no contractions, no bleeding, no loss of fluid   Discussed monitoring fetal movement     1) concerns: feels her eyes are a bit swollen, exam is normal, perhaps seasonal allergies, ok to take zyrtec     2) Routine: Blood type A+ RI tdap [ ] flu [x] GS [dec]   Covid v [x] GTT [ ]   3) Risk factors:   A: History of pre-eclampsia with IUFD @ 28 weeks:  Baby aspirin ordered, baseline labs  normal              MFM consult               Previously they recommended ob visits q 2 weeks, growth 4 weeks, and weekly bpp 28 weeks   B: Anemia: slo-fe taking   C: clomid for 2nd two pregnancies @ 100 mg     4) Return: 2 weeks     Janay

## 2023-05-19 NOTE — PATIENT INSTRUCTIONS
"Return 2 weeks   Return to clinic:  every 4 weeks till 28 weeks, then every 2 weeks till 36 weeks, then weekly till delivery      Phone numbers [place]:  Day/ night [phone] ask for ob triage  Emergency:  Call labor and delivery:  [phone]    What should I call about??    Contraction every 5 minutes for 1 hour 1 minute long (511), bleeding, loss of fluid, headache that doesn't resolve with tylenol, and decreased fetal movement     Start kick counts @ 26-28 weeks   There is an hamzah for this!  It is called \"count the kicks\"  Keep track of movement and discover your normal baby movement pattern   guideline is listed below  Please call if you do not feel the baby move!  We will have you come in for fetal heart rate monitoring:   Perception of at least 10 FMs during 12 hours of normal maternal activity   Perception of least 10 FMs over two hours when the mother is at rest and focused on [facility] Address   [address], [place], [place] [address] [phone]    Dr. Mercedez Gustafson, DO    OB/GYN   [facility] and [facility]                                                    "

## 2023-05-19 NOTE — NURSING NOTE
"Chief Complaint   Patient presents with     Prenatal Care     16 weeks 1 days   MFM anatomy 2023          Initial BP 98/62 (BP Location: Left arm, Cuff Size: Adult Regular)   Wt 72.9 kg (160 lb 11.2 oz)   LMP 2023 (Exact Date)   BMI 26.74 kg/m   Estimated body mass index is 26.74 kg/m  as calculated from the following:    Height as of 23: 1.651 m (5' 5\").    Weight as of this encounter: 72.9 kg (160 lb 11.2 oz).  BP completed using cuff size: regular    Questioned patient about current smoking habits.  Pt. has never smoked.    16w1d      The following HM Due: NONE        [facility] anatomy 2023       Wanda Loredo CMA on 2023 at 1:52 PM      "

## 2023-05-30 ENCOUNTER — PRE VISIT (OUTPATIENT)
Dept: MATERNAL FETAL MEDICINE | Facility: CLINIC | Age: 33
End: 2023-05-30
Payer: COMMERCIAL

## 2023-06-05 ENCOUNTER — PRENATAL OFFICE VISIT (OUTPATIENT)
Dept: OBGYN | Facility: CLINIC | Age: 33
End: 2023-06-05
Payer: COMMERCIAL

## 2023-06-05 VITALS
SYSTOLIC BLOOD PRESSURE: 98 MMHG | WEIGHT: 164.7 LBS | HEIGHT: 65 IN | DIASTOLIC BLOOD PRESSURE: 64 MMHG | BODY MASS INDEX: 27.44 KG/M2

## 2023-06-05 DIAGNOSIS — Z87.59 HISTORY OF IUFD: ICD-10-CM

## 2023-06-05 DIAGNOSIS — O09.292 HISTORY OF PRE-ECLAMPSIA IN PRIOR PREGNANCY, CURRENTLY PREGNANT IN SECOND TRIMESTER: Primary | ICD-10-CM

## 2023-06-05 PROCEDURE — 99207 PR PRENATAL VISIT: CPT | Performed by: FAMILY MEDICINE

## 2023-06-05 NOTE — PROGRESS NOTES
"CC: Here for routine prenatal visit   33 year old y/o  @ 18w4d with Estimated Date of Delivery: 2023     BP 98/64   Ht 1.651 m (5' 5\")   Wt 74.7 kg (164 lb 11.2 oz)   LMP 2023 (Exact Date)   BMI 27.41 kg/m     See OB flowsheet  + fetal movement, no contractions, no bleeding, no loss of fluid   Discussed monitoring fetal movement     1) concerns: feels the baby kicking   2) Routine: Blood type A+ RI tdap [ ] flu [x] GS [dec]   Covid v [x] GTT [ ]   3) Risk factors:   A: History of pre-eclampsia with IUFD @ 28 weeks:  Baby aspirin ordered, baseline labs  normal              MFM consult               Previously they recommended ob visits q 2 weeks, growth 4 weeks, and weekly bpp 28 weeks   B: Anemia: slo-fe taking    C: PCOS: clomid for conception of pregnancies  4) Return: 2 weeks     Tillboos     "

## 2023-06-05 NOTE — PATIENT INSTRUCTIONS
"Return 2 weeks   Return to clinic:  every 4 weeks till 28 weeks, then every 2 weeks till 36 weeks, then weekly till delivery      Phone numbers [place]:  Day/ night [phone] ask for ob triage  Emergency:  Call labor and delivery:  [phone]    What should I call about??    Contraction every 5 minutes for 1 hour 1 minute long (511), bleeding, loss of fluid, headache that doesn't resolve with tylenol, and decreased fetal movement     Start kick counts @ 26-28 weeks   There is an hamzah for this!  It is called \"count the kicks\"  Keep track of movement and discover your normal baby movement pattern   guideline is listed below  Please call if you do not feel the baby move!  We will have you come in for fetal heart rate monitoring:   Perception of at least 10 FMs during 12 hours of normal maternal activity   Perception of least 10 FMs over two hours when the mother is at rest and focused on [facility] Address   [address], [place], [place] [address] [phone]    Dr. Mercedez Gustafson, DO    OB/GYN   [facility] and [facility]                                                    "

## 2023-06-05 NOTE — NURSING NOTE
"18w4d    Chief Complaint   Patient presents with     Prenatal Care       Initial BP 98/64   Ht 1.651 m (5' 5\")   Wt 74.7 kg (164 lb 11.2 oz)   LMP 2023 (Exact Date)   BMI 27.41 kg/m   Estimated body mass index is 27.41 kg/m  as calculated from the following:    Height as of this encounter: 1.651 m (5' 5\").    Weight as of this encounter: 74.7 kg (164 lb 11.2 oz).  BP completed using cuff size: regular    Questioned patient about current smoking habits.  Pt. has never smoked.          The following HM Due: NONE    "

## 2023-06-06 ENCOUNTER — OFFICE VISIT (OUTPATIENT)
Dept: MATERNAL FETAL MEDICINE | Facility: CLINIC | Age: 33
End: 2023-06-06
Attending: FAMILY MEDICINE
Payer: COMMERCIAL

## 2023-06-06 ENCOUNTER — HOSPITAL ENCOUNTER (OUTPATIENT)
Dept: ULTRASOUND IMAGING | Facility: CLINIC | Age: 33
Discharge: HOME | End: 2023-06-06
Attending: FAMILY MEDICINE
Payer: COMMERCIAL

## 2023-06-06 DIAGNOSIS — O26.90 PREGNANCY RELATED CONDITION, ANTEPARTUM: ICD-10-CM

## 2023-06-06 DIAGNOSIS — Z87.59 HISTORY OF IUFD: Primary | ICD-10-CM

## 2023-06-06 DIAGNOSIS — Z36.2 ENCOUNTER FOR FOLLOW-UP ULTRASOUND OF FETAL ANATOMY: ICD-10-CM

## 2023-06-06 DIAGNOSIS — O09.299 HISTORY OF PRE-ECLAMPSIA IN PRIOR PREGNANCY, CURRENTLY PREGNANT: ICD-10-CM

## 2023-06-06 PROCEDURE — 76811 OB US DETAILED SNGL FETUS: CPT | Mod: 26 | Performed by: OBSTETRICS & GYNECOLOGY

## 2023-06-06 PROCEDURE — 76811 OB US DETAILED SNGL FETUS: CPT

## 2023-06-06 NOTE — PROGRESS NOTES
"Please see \"Imaging\" tab under \"Chart Review\" for details of today's US at the [facility].    Alexander Gaspar MD  Maternal-Fetal Medicine    "

## 2023-06-19 ENCOUNTER — PRENATAL OFFICE VISIT (OUTPATIENT)
Dept: OBGYN | Facility: CLINIC | Age: 33
End: 2023-06-19
Payer: COMMERCIAL

## 2023-06-19 VITALS — SYSTOLIC BLOOD PRESSURE: 98 MMHG | DIASTOLIC BLOOD PRESSURE: 62 MMHG | BODY MASS INDEX: 27.72 KG/M2 | WEIGHT: 166.6 LBS

## 2023-06-19 DIAGNOSIS — O09.292 HISTORY OF PRE-ECLAMPSIA IN PRIOR PREGNANCY, CURRENTLY PREGNANT IN SECOND TRIMESTER: ICD-10-CM

## 2023-06-19 DIAGNOSIS — Z87.59 HISTORY OF IUFD: Primary | ICD-10-CM

## 2023-06-19 DIAGNOSIS — Z34.92 PRENATAL CARE IN SECOND TRIMESTER: ICD-10-CM

## 2023-06-19 PROCEDURE — 99207 PR PRENATAL VISIT: CPT | Performed by: FAMILY MEDICINE

## 2023-06-19 NOTE — PROGRESS NOTES
CC: Here for routine prenatal visit   33 year old y/o  @ 20w4d with Estimated Date of Delivery: 2023     BP 98/62 (BP Location: Left arm, Cuff Size: Adult Regular)   Wt 75.6 kg (166 lb 9.6 oz)   LMP 2023 (Exact Date)   BMI 27.72 kg/m    See OB flowsheet  + fetal movement, no contractions, no bleeding, no loss of fluid   Discussed monitoring fetal movement     1) concerns: doing well!    2) Routine: Blood type A+ RI tdap [ ] flu [x] GS [dec]   Covid v [x] GTT [ ]   3) Risk factors:   A: History of pre-eclampsia with IUFD @ 28 weeks:  Baby aspirin ordered, baseline labs  normal              MFM consult  with normal level II us, serial growth recommended,               Previously they recommended ob visits q 2 weeks, growth 4 weeks, and weekly bpp 28 weeks    And we will continue this this pgy.    Neg APA testing  B: Anemia: slo-fe taking               C: PCOS: clomid for conception of pregnancies  Patient Active Problem List   Diagnosis     H/O severe pre-eclampsia     4) Return: 2 weeks    [facility]

## 2023-06-19 NOTE — NURSING NOTE
"Chief Complaint   Patient presents with     Prenatal Care     20 weeks 4 days   MFM  & 2023          Initial BP 98/62 (BP Location: Left arm, Cuff Size: Adult Regular)   Wt 75.6 kg (166 lb 9.6 oz)   LMP 2023 (Exact Date)   BMI 27.72 kg/m   Estimated body mass index is 27.72 kg/m  as calculated from the following:    Height as of 23: 1.651 m (5' 5\").    Weight as of this encounter: 75.6 kg (166 lb 9.6 oz).  BP completed using cuff size: regular    Questioned patient about current smoking habits.  Pt. has never smoked.    20w4d            The following HM Due: NONE        +FM Daily  [facility] 2023         Wanda Loredo, CMA on 2023 at 2:09 PM                  "

## 2023-06-19 NOTE — PATIENT INSTRUCTIONS
"Return 2 weeks  Return to clinic:  every 4 weeks till 28 weeks, then every 2 weeks till 36 weeks, then weekly till delivery      Phone numbers [place]:  Day/ night [phone] ask for ob triage  Emergency:  Call labor and delivery:  [phone]    What should I call about??    Contraction every 5 minutes for 1 hour 1 minute long (511), bleeding, loss of fluid, headache that doesn't resolve with tylenol, and decreased fetal movement     Start kick counts @ 26-28 weeks   There is an hamzah for this!  It is called \"count the kicks\"  Keep track of movement and discover your normal baby movement pattern   guideline is listed below  Please call if you do not feel the baby move!  We will have you come in for fetal heart rate monitoring:   Perception of at least 10 FMs during 12 hours of normal maternal activity   Perception of least 10 FMs over two hours when the mother is at rest and focused on [facility] Address   [address], [place], [place] [address] [phone]    Dr. Mercedez Gustafson, DO    OB/GYN   [facility] and [facility]                                                   "

## 2023-07-05 ENCOUNTER — HOSPITAL ENCOUNTER (OUTPATIENT)
Dept: ULTRASOUND IMAGING | Facility: CLINIC | Age: 33
Discharge: HOME | End: 2023-07-05
Attending: OBSTETRICS & GYNECOLOGY
Payer: COMMERCIAL

## 2023-07-05 ENCOUNTER — OFFICE VISIT (OUTPATIENT)
Dept: MATERNAL FETAL MEDICINE | Facility: CLINIC | Age: 33
End: 2023-07-05
Attending: OBSTETRICS & GYNECOLOGY
Payer: COMMERCIAL

## 2023-07-05 VITALS — DIASTOLIC BLOOD PRESSURE: 77 MMHG | SYSTOLIC BLOOD PRESSURE: 117 MMHG

## 2023-07-05 DIAGNOSIS — Z87.59 HISTORY OF IUFD: ICD-10-CM

## 2023-07-05 DIAGNOSIS — Z36.2 ENCOUNTER FOR FOLLOW-UP ULTRASOUND OF FETAL ANATOMY: ICD-10-CM

## 2023-07-05 DIAGNOSIS — O09.299 HISTORY OF PRE-ECLAMPSIA IN PRIOR PREGNANCY, CURRENTLY PREGNANT: ICD-10-CM

## 2023-07-05 DIAGNOSIS — O36.5920 POOR FETAL GROWTH AFFECTING MANAGEMENT OF MOTHER IN SECOND TRIMESTER, SINGLE OR UNSPECIFIED FETUS: Primary | ICD-10-CM

## 2023-07-05 PROCEDURE — 99215 OFFICE O/P EST HI 40 MIN: CPT | Mod: 25 | Performed by: OBSTETRICS & GYNECOLOGY

## 2023-07-05 PROCEDURE — 76816 OB US FOLLOW-UP PER FETUS: CPT | Mod: 26 | Performed by: OBSTETRICS & GYNECOLOGY

## 2023-07-05 PROCEDURE — 76820 UMBILICAL ARTERY ECHO: CPT | Mod: 26 | Performed by: OBSTETRICS & GYNECOLOGY

## 2023-07-05 PROCEDURE — 76820 UMBILICAL ARTERY ECHO: CPT

## 2023-07-05 NOTE — NURSING NOTE
Patient reports positive fetal movement, no pain, no contractions, leaking of fluid, or bleeding. SBAR given to PATRICIA MONTOYA, see their note in Epic.

## 2023-07-05 NOTE — PROGRESS NOTES
The patient was seen for an ultrasound in the Maternal-Fetal Medicine Center at the [facility] today.  For a detailed report of the ultrasound examination, please see the ultrasound report which can be found under the imaging tab.    If you have questions regarding today's evaluation or if we can be of further service, please contact the Maternal-Fetal Medicine Center.    Magnolia John MD  , OB/GYN  Maternal-Fetal Medicine  [phone] (Pager)

## 2023-07-07 ENCOUNTER — PRENATAL OFFICE VISIT (OUTPATIENT)
Dept: OBGYN | Facility: CLINIC | Age: 33
End: 2023-07-07
Payer: COMMERCIAL

## 2023-07-07 VITALS — SYSTOLIC BLOOD PRESSURE: 104 MMHG | BODY MASS INDEX: 28.12 KG/M2 | DIASTOLIC BLOOD PRESSURE: 70 MMHG | WEIGHT: 169 LBS

## 2023-07-07 DIAGNOSIS — O36.5990 FETAL GROWTH RESTRICTION ANTEPARTUM: ICD-10-CM

## 2023-07-07 DIAGNOSIS — O09.90 SUPERVISION OF HIGH RISK PREGNANCY, ANTEPARTUM: Primary | ICD-10-CM

## 2023-07-07 PROCEDURE — 99207 PR PRENATAL VISIT: CPT | Performed by: OBSTETRICS & GYNECOLOGY

## 2023-07-07 NOTE — PROGRESS NOTES
CC: Here for routine prenatal visit   33 year old y/o  @ 23w1d with Estimated Date of Delivery: 2023     /70   Wt 76.7 kg (169 lb)   LMP 2023 (Exact Date)   BMI 28.12 kg/m    See OB flowsheet  + fetal movement, no contractions, no bleeding, no loss of fluid   Discussed monitoring fetal movement     1) concerns: IUGR, 6th%ile on . Will return for weekly BPPs, dopplers, and NSTs with MFM, q2w OB visits   2) Routine: Blood type A+ RI tdap [ ] flu [x] GS [dec]   Covid v [x] GTT [ ]   3) Risk factors:   A: History of pre-eclampsia with IUFD @ 28 weeks:  Baby aspirin ordered, baseline labs  normal              MFM consult  with normal level II us, serial growth recommended,               Neg APA testing  B: Anemia: slo-fe taking               C: PCOS: clomid for conception of pregnancies  Patient Active Problem List   Diagnosis     H/O severe pre-eclampsia     4) Return: 2 weeks    Marissa Tate MD

## 2023-07-07 NOTE — NURSING NOTE
"Chief Complaint   Patient presents with     Prenatal Care     23 weeks 1 day. No c/o VB, LoF, cramping/contractions. No c/o headaches, vision changes, swelling. Feeling FM daily. No questions or concerns       Initial /70   Wt 76.7 kg (169 lb)   LMP 2023 (Exact Date)   BMI 28.12 kg/m   Estimated body mass index is 28.12 kg/m  as calculated from the following:    Height as of 23: 1.651 m (5' 5\").    Weight as of this encounter: 76.7 kg (169 lb).  BP completed using cuff size: regular    Questioned patient about current smoking habits.  Pt. has never smoked.          The following HM Due: NONE      Mary Dunbar CMA               "

## 2023-07-14 ENCOUNTER — OFFICE VISIT (OUTPATIENT)
Dept: MATERNAL FETAL MEDICINE | Facility: CLINIC | Age: 33
End: 2023-07-14
Attending: OBSTETRICS & GYNECOLOGY
Payer: COMMERCIAL

## 2023-07-14 ENCOUNTER — HOSPITAL ENCOUNTER (OUTPATIENT)
Dept: ULTRASOUND IMAGING | Facility: CLINIC | Age: 33
Discharge: HOME | End: 2023-07-14
Attending: OBSTETRICS & GYNECOLOGY
Payer: COMMERCIAL

## 2023-07-14 DIAGNOSIS — O36.5920 POOR FETAL GROWTH AFFECTING MANAGEMENT OF MOTHER IN SECOND TRIMESTER, SINGLE OR UNSPECIFIED FETUS: ICD-10-CM

## 2023-07-14 DIAGNOSIS — O36.5920 POOR FETAL GROWTH AFFECTING MANAGEMENT OF MOTHER IN SECOND TRIMESTER, SINGLE OR UNSPECIFIED FETUS: Primary | ICD-10-CM

## 2023-07-14 PROCEDURE — 76815 OB US LIMITED FETUS(S): CPT | Mod: 26 | Performed by: OBSTETRICS & GYNECOLOGY

## 2023-07-14 PROCEDURE — 59025 FETAL NON-STRESS TEST: CPT | Mod: 26 | Performed by: OBSTETRICS & GYNECOLOGY

## 2023-07-14 PROCEDURE — 76820 UMBILICAL ARTERY ECHO: CPT | Mod: 26 | Performed by: OBSTETRICS & GYNECOLOGY

## 2023-07-14 PROCEDURE — 59025 FETAL NON-STRESS TEST: CPT

## 2023-07-14 PROCEDURE — 76815 OB US LIMITED FETUS(S): CPT

## 2023-07-14 NOTE — PROGRESS NOTES
"Please see \"Imaging\" tab under \"Chart Review\" for details of today's US at the [facility].    Alexander Gaspar MD  Maternal-Fetal Medicine    "

## 2023-07-14 NOTE — NURSING NOTE
Patient reports + fetal movement, no pain, no contractions, leaking of fluid, or bleeding. Patient denies headache, visual changes, nausea/vomiting, epigastric pain related to preeclampsia. SBAR given to PATRICIA MONTOYA, see their note in Epic.

## 2023-07-19 ENCOUNTER — PRENATAL OFFICE VISIT (OUTPATIENT)
Dept: OBGYN | Facility: CLINIC | Age: 33
End: 2023-07-19
Payer: COMMERCIAL

## 2023-07-19 VITALS — SYSTOLIC BLOOD PRESSURE: 100 MMHG | BODY MASS INDEX: 28.41 KG/M2 | WEIGHT: 170.7 LBS | DIASTOLIC BLOOD PRESSURE: 62 MMHG

## 2023-07-19 DIAGNOSIS — O09.90 SUPERVISION OF HIGH RISK PREGNANCY, ANTEPARTUM: Primary | ICD-10-CM

## 2023-07-19 DIAGNOSIS — Z87.59 HISTORY OF IUFD: ICD-10-CM

## 2023-07-19 DIAGNOSIS — O36.5990 FETAL GROWTH RESTRICTION ANTEPARTUM: ICD-10-CM

## 2023-07-19 DIAGNOSIS — O09.292 HISTORY OF PRE-ECLAMPSIA IN PRIOR PREGNANCY, CURRENTLY PREGNANT IN SECOND TRIMESTER: ICD-10-CM

## 2023-07-19 PROCEDURE — 99207 PR PRENATAL VISIT: CPT | Performed by: FAMILY MEDICINE

## 2023-07-19 NOTE — NURSING NOTE
"24w6d    Chief Complaint   Patient presents with     Prenatal Care       Initial /62   Wt 77.4 kg (170 lb 11.2 oz)   LMP 2023 (Exact Date)   BMI 28.41 kg/m   Estimated body mass index is 28.41 kg/m  as calculated from the following:    Height as of 23: 1.651 m (5' 5\").    Weight as of this encounter: 77.4 kg (170 lb 11.2 oz).  BP completed using cuff size: regular    Questioned patient about current smoking habits.  Pt. has never smoked.          The following HM Due: NONE      "

## 2023-07-19 NOTE — PATIENT INSTRUCTIONS
"Return 4 weeks  Return to clinic:  every 4 weeks till 28 weeks, then every 2 weeks till 36 weeks, then weekly till delivery      Phone numbers [place]:  Day/ night [phone] ask for ob triage  Emergency:  Call labor and delivery:  [phone]    What should I call about??    Contraction every 5 minutes for 1 hour 1 minute long (511), bleeding, loss of fluid, headache that doesn't resolve with tylenol, and decreased fetal movement     Start kick counts @ 26-28 weeks   There is an hamzah for this!  It is called \"count the kicks\"  Keep track of movement and discover your normal baby movement pattern   guideline is listed below  Please call if you do not feel the baby move!  We will have you come in for fetal heart rate monitoring:   Perception of at least 10 FMs during 12 hours of normal maternal activity   Perception of least 10 FMs over two hours when the mother is at rest and focused on [facility] Address   [address], [place], [place] [address] [phone]    Dr. Mercedez Gustafson, DO    OB/GYN   [facility] and [facility]                                                    "

## 2023-07-19 NOTE — PROGRESS NOTES
CC: Here for routine prenatal visit   33 year old y/o  @ 24w6d with Estimated Date of Delivery: 2023     /62   Wt 77.4 kg (170 lb 11.2 oz)   LMP 2023 (Exact Date)   BMI 28.41 kg/m    See OB flowsheet  + fetal movement, no contractions, no bleeding, no loss of fluid   Discussed monitoring fetal movement     1) concerns: discussed recent diagnosis of IUGR  2) Routine: Blood type A+ RI tdap [ ] flu [x] GS [dec]   Covid v [x] GTT [ ]   3) Risk factors:   A: History of pre-eclampsia with IUFD @ 28 weeks:  Taking baby aspirin, baseline labs  normal              MFM consult  with normal level II us, serial growth recommended,               Previously they recommended ob visits q 2 weeks, growth 4 weeks, and weekly bpp 28 weeks               And we will continue this this pgy.               Neg APA testing  B: Anemia: slo-fe taking               C: PCOS: clomid for conception of pregnancies   D:  IUGR: @ 6th%weekly bpp with NST with dopplers with MFM , serial growth us    4) Return: 4 weeks     [facility]

## 2023-07-21 ENCOUNTER — OFFICE VISIT (OUTPATIENT)
Dept: MATERNAL FETAL MEDICINE | Facility: CLINIC | Age: 33
End: 2023-07-21
Attending: OBSTETRICS & GYNECOLOGY
Payer: COMMERCIAL

## 2023-07-21 ENCOUNTER — HOSPITAL ENCOUNTER (OUTPATIENT)
Dept: ULTRASOUND IMAGING | Facility: CLINIC | Age: 33
Discharge: HOME | End: 2023-07-21
Attending: OBSTETRICS & GYNECOLOGY
Payer: COMMERCIAL

## 2023-07-21 DIAGNOSIS — O36.5920 POOR FETAL GROWTH AFFECTING MANAGEMENT OF MOTHER IN SECOND TRIMESTER, SINGLE OR UNSPECIFIED FETUS: Primary | ICD-10-CM

## 2023-07-21 DIAGNOSIS — O36.5920 POOR FETAL GROWTH AFFECTING MANAGEMENT OF MOTHER IN SECOND TRIMESTER, SINGLE OR UNSPECIFIED FETUS: ICD-10-CM

## 2023-07-21 PROCEDURE — 76815 OB US LIMITED FETUS(S): CPT | Mod: 26 | Performed by: OBSTETRICS & GYNECOLOGY

## 2023-07-21 PROCEDURE — 76820 UMBILICAL ARTERY ECHO: CPT | Mod: 26 | Performed by: OBSTETRICS & GYNECOLOGY

## 2023-07-21 PROCEDURE — 59025 FETAL NON-STRESS TEST: CPT | Mod: 26 | Performed by: OBSTETRICS & GYNECOLOGY

## 2023-07-21 PROCEDURE — 76815 OB US LIMITED FETUS(S): CPT

## 2023-07-21 PROCEDURE — 59025 FETAL NON-STRESS TEST: CPT

## 2023-07-21 NOTE — PROGRESS NOTES
The patient was seen for an ultrasound in the Maternal-Fetal Medicine Center at the [facility] today.  For a detailed report of the ultrasound examination, please see the ultrasound report which can be found under the imaging tab.    If you have questions regarding today's evaluation or if we can be of further service, please contact the Maternal-Fetal Medicine Center.    Magnolia John MD  , OB/GYN  Maternal-Fetal Medicine  [phone] (Pager)

## 2023-07-21 NOTE — NURSING NOTE
Patient reports good fetal movement, denies pain,  contractions, leaking of fluid, or bleeding.  Patient denies headache, visual changes, nausea/vomiting, epigastric pain related to preeclampsia.  SBAR given to PATRICIA MONTOYA, see their note in Epic.      NST Performed due to FGR.  Dr. John reviewed efm tracing. See NST/BPP Doc Flowsheet tab.

## 2023-07-28 ENCOUNTER — OFFICE VISIT (OUTPATIENT)
Dept: MATERNAL FETAL MEDICINE | Facility: CLINIC | Age: 33
End: 2023-07-28
Attending: OBSTETRICS & GYNECOLOGY
Payer: COMMERCIAL

## 2023-07-28 ENCOUNTER — HOSPITAL ENCOUNTER (OUTPATIENT)
Dept: ULTRASOUND IMAGING | Facility: CLINIC | Age: 33
Discharge: HOME | End: 2023-07-28
Attending: OBSTETRICS & GYNECOLOGY
Payer: COMMERCIAL

## 2023-07-28 DIAGNOSIS — O36.5920 POOR FETAL GROWTH AFFECTING MANAGEMENT OF MOTHER IN SECOND TRIMESTER, SINGLE OR UNSPECIFIED FETUS: ICD-10-CM

## 2023-07-28 PROCEDURE — 59025 FETAL NON-STRESS TEST: CPT

## 2023-07-28 PROCEDURE — 76820 UMBILICAL ARTERY ECHO: CPT | Mod: 26 | Performed by: OBSTETRICS & GYNECOLOGY

## 2023-07-28 PROCEDURE — 76820 UMBILICAL ARTERY ECHO: CPT

## 2023-07-28 PROCEDURE — 59025 FETAL NON-STRESS TEST: CPT | Mod: 26 | Performed by: OBSTETRICS & GYNECOLOGY

## 2023-07-28 PROCEDURE — 76816 OB US FOLLOW-UP PER FETUS: CPT | Mod: 26 | Performed by: OBSTETRICS & GYNECOLOGY

## 2023-07-28 NOTE — PROGRESS NOTES
Please refer to ultrasound report under 'Imaging' Studies of 'Chart Review' tabs.    Jagjit Steele M.D.

## 2023-07-28 NOTE — NURSING NOTE
Patient reports good fetal movement, denies pain,  contractions, leaking of fluid, or bleeding.   Patient denies headache, visual changes, nausea/vomiting, epigastric pain related to preeclampsia.   SBAR given to PATRICIA MONTOYA, see their note in Epic.    NST Performed due to FGR.   reviewed efm tracing. See NST/BPP Doc Flowsheet tab.

## 2023-07-30 ENCOUNTER — HEALTH MAINTENANCE LETTER (OUTPATIENT)
Age: 33
End: 2023-07-30

## 2023-08-03 ENCOUNTER — PRENATAL OFFICE VISIT (OUTPATIENT)
Dept: OBGYN | Facility: CLINIC | Age: 33
End: 2023-08-03
Payer: COMMERCIAL

## 2023-08-03 VITALS
SYSTOLIC BLOOD PRESSURE: 110 MMHG | HEIGHT: 64 IN | BODY MASS INDEX: 29.53 KG/M2 | DIASTOLIC BLOOD PRESSURE: 60 MMHG | WEIGHT: 173 LBS

## 2023-08-03 DIAGNOSIS — O09.90 SUPERVISION OF HIGH RISK PREGNANCY, ANTEPARTUM: Primary | ICD-10-CM

## 2023-08-03 LAB
ERYTHROCYTE [DISTWIDTH] IN BLOOD BY AUTOMATED COUNT: 14.4 % (ref 10–15)
GLUCOSE 1H P 50 G GLC PO SERPL-MCNC: 105 MG/DL (ref 70–129)
HCT VFR BLD AUTO: 32 % (ref 35–47)
HGB BLD-MCNC: 10.9 G/DL (ref 11.7–15.7)
MCH RBC QN AUTO: 31.1 PG (ref 26.5–33)
MCHC RBC AUTO-ENTMCNC: 34.1 G/DL (ref 31.5–36.5)
MCV RBC AUTO: 91 FL (ref 78–100)
PLATELET # BLD AUTO: 248 10E3/UL (ref 150–450)
RBC # BLD AUTO: 3.51 10E6/UL (ref 3.8–5.2)
T PALLIDUM AB SER QL: NONREACTIVE
WBC # BLD AUTO: 8.9 10E3/UL (ref 4–11)

## 2023-08-03 PROCEDURE — 36415 COLL VENOUS BLD VENIPUNCTURE: CPT | Performed by: FAMILY MEDICINE

## 2023-08-03 PROCEDURE — 82950 GLUCOSE TEST: CPT | Performed by: FAMILY MEDICINE

## 2023-08-03 PROCEDURE — 86780 TREPONEMA PALLIDUM: CPT | Performed by: FAMILY MEDICINE

## 2023-08-03 PROCEDURE — 99207 PR PRENATAL VISIT: CPT | Performed by: FAMILY MEDICINE

## 2023-08-03 PROCEDURE — 85027 COMPLETE CBC AUTOMATED: CPT | Performed by: FAMILY MEDICINE

## 2023-08-03 NOTE — PROGRESS NOTES
"CC: Here for routine prenatal visit   33 year old y/o  @ 27w0d with Estimated Date of Delivery: 2023     /60   Ht 1.626 m (5' 4\")   Wt 78.5 kg (173 lb)   LMP 2023 (Exact Date)   BMI 29.70 kg/m    See OB flowsheet  + fetal movement, no contractions, no bleeding, no loss of fluid   Discussed monitoring fetal movement     1) concerns: having some dizziness add hydration, glucose through small meals, increasing hydration, check GDM and hemoglobin, will try eating more often   2) Routine: Blood type A+ RI tdap [ ] flu [x] GS [dec]   Covid v [x] GTT [ ]   3) Risk factors:   A: History of pre-eclampsia with IUFD @ 28 weeks:  Taking baby aspirin, baseline labs  normal              MFM consult  with normal level II us, serial growth recommended,               Previously they recommended ob visits q 2 weeks, growth 4 weeks, and weekly bpp 28 weeks               And we will continue this this pgy.               Neg APA testing  B: Anemia: slo-fe taking               C: PCOS: clomid for conception of pregnancies              D:  IUGR: @ 4th% weekly bpp with NST with dopplers with MFM , serial growth us     Patient Active Problem List   Diagnosis    H/O severe pre-eclampsia       4) Return: 2 weeks    Janay     "

## 2023-08-03 NOTE — PATIENT INSTRUCTIONS
"Return 2 weeks  Return to clinic:  every 4 weeks till 28 weeks, then every 2 weeks till 36 weeks, then weekly till delivery      Phone numbers [place]:  Day/ night [phone] ask for ob triage  Emergency:  Call labor and delivery:  [phone]    What should I call about??    Contraction every 5 minutes for 1 hour 1 minute long (511), bleeding, loss of fluid, headache that doesn't resolve with tylenol, and decreased fetal movement     Start kick counts @ 26-28 weeks   There is an hamzah for this!  It is called \"count the kicks\"  Keep track of movement and discover your normal baby movement pattern   guideline is listed below  Please call if you do not feel the baby move!  We will have you come in for fetal heart rate monitoring:   Perception of at least 10 FMs during 12 hours of normal maternal activity   Perception of least 10 FMs over two hours when the mother is at rest and focused on [facility] Address   [address], [place], [place] [address] [phone]    Dr. Mercedez Gustafson, DO    OB/GYN   [facility] and [facility]                                                    "

## 2023-08-04 ENCOUNTER — HOSPITAL ENCOUNTER (OUTPATIENT)
Dept: ULTRASOUND IMAGING | Facility: CLINIC | Age: 33
Discharge: HOME | End: 2023-08-04
Attending: OBSTETRICS & GYNECOLOGY
Payer: COMMERCIAL

## 2023-08-04 ENCOUNTER — OFFICE VISIT (OUTPATIENT)
Dept: MATERNAL FETAL MEDICINE | Facility: CLINIC | Age: 33
End: 2023-08-04
Attending: OBSTETRICS & GYNECOLOGY
Payer: COMMERCIAL

## 2023-08-04 DIAGNOSIS — O36.5920 POOR FETAL GROWTH AFFECTING MANAGEMENT OF MOTHER IN SECOND TRIMESTER, SINGLE OR UNSPECIFIED FETUS: ICD-10-CM

## 2023-08-04 PROCEDURE — 76820 UMBILICAL ARTERY ECHO: CPT | Mod: 26 | Performed by: OBSTETRICS & GYNECOLOGY

## 2023-08-04 PROCEDURE — 59025 FETAL NON-STRESS TEST: CPT

## 2023-08-04 PROCEDURE — 76815 OB US LIMITED FETUS(S): CPT

## 2023-08-04 PROCEDURE — 76815 OB US LIMITED FETUS(S): CPT | Mod: 26 | Performed by: OBSTETRICS & GYNECOLOGY

## 2023-08-04 NOTE — NURSING NOTE
Patient presents to MFM for FGR. Positive fetal movement. Denies LOF, vaginal bleeding or cramping/contractions. SBAR given to MFM MD, see their note in Epic.  NST Performed due to FGR.  Dr. Chisholm reviewed efm tracing. See NST/BPP Doc Flowsheet tab.

## 2023-08-04 NOTE — PROGRESS NOTES
Please see full imaging report from [employer] program under imaging tab.    Shlomo Chisholm MD  Maternal Fetal Medicine

## 2023-08-11 ENCOUNTER — HOSPITAL ENCOUNTER (OUTPATIENT)
Dept: ULTRASOUND IMAGING | Facility: CLINIC | Age: 33
Discharge: HOME | End: 2023-08-11
Attending: OBSTETRICS & GYNECOLOGY
Payer: COMMERCIAL

## 2023-08-11 ENCOUNTER — OFFICE VISIT (OUTPATIENT)
Dept: MATERNAL FETAL MEDICINE | Facility: CLINIC | Age: 33
End: 2023-08-11
Attending: OBSTETRICS & GYNECOLOGY
Payer: COMMERCIAL

## 2023-08-11 DIAGNOSIS — O36.5920 POOR FETAL GROWTH AFFECTING MANAGEMENT OF MOTHER IN SECOND TRIMESTER, SINGLE OR UNSPECIFIED FETUS: Primary | ICD-10-CM

## 2023-08-11 DIAGNOSIS — O36.5920 POOR FETAL GROWTH AFFECTING MANAGEMENT OF MOTHER IN SECOND TRIMESTER, SINGLE OR UNSPECIFIED FETUS: ICD-10-CM

## 2023-08-11 PROCEDURE — 76820 UMBILICAL ARTERY ECHO: CPT

## 2023-08-11 PROCEDURE — 76815 OB US LIMITED FETUS(S): CPT | Mod: 26 | Performed by: OBSTETRICS & GYNECOLOGY

## 2023-08-11 PROCEDURE — 59025 FETAL NON-STRESS TEST: CPT

## 2023-08-11 PROCEDURE — 76820 UMBILICAL ARTERY ECHO: CPT | Mod: 26 | Performed by: OBSTETRICS & GYNECOLOGY

## 2023-08-11 PROCEDURE — 59025 FETAL NON-STRESS TEST: CPT | Mod: 26 | Performed by: OBSTETRICS & GYNECOLOGY

## 2023-08-11 NOTE — PROGRESS NOTES
"Please see \"Imaging\" tab under \"Chart Review\" for details of today's visit.    Kristine Godwin MD PhD  Maternal Fetal Medicine     "

## 2023-08-11 NOTE — NURSING NOTE
Patient presents to M for NST/Limited/UAR at 28w1d due to FGR. Positive fetal movement. Denies LOF, vaginal bleeding or cramping/contractions. SBAR given to M MD, see their note in Epic.

## 2023-08-18 ENCOUNTER — OFFICE VISIT (OUTPATIENT)
Dept: MATERNAL FETAL MEDICINE | Facility: CLINIC | Age: 33
End: 2023-08-18
Attending: OBSTETRICS & GYNECOLOGY
Payer: COMMERCIAL

## 2023-08-18 ENCOUNTER — HOSPITAL ENCOUNTER (OUTPATIENT)
Dept: ULTRASOUND IMAGING | Facility: CLINIC | Age: 33
Discharge: HOME | End: 2023-08-18
Attending: OBSTETRICS & GYNECOLOGY
Payer: COMMERCIAL

## 2023-08-18 DIAGNOSIS — O36.5930 POOR FETAL GROWTH AFFECTING MANAGEMENT OF MOTHER IN THIRD TRIMESTER, SINGLE OR UNSPECIFIED FETUS: ICD-10-CM

## 2023-08-18 DIAGNOSIS — O36.5920 POOR FETAL GROWTH AFFECTING MANAGEMENT OF MOTHER IN SECOND TRIMESTER, SINGLE OR UNSPECIFIED FETUS: ICD-10-CM

## 2023-08-18 PROCEDURE — 59025 FETAL NON-STRESS TEST: CPT | Mod: 26 | Performed by: OBSTETRICS & GYNECOLOGY

## 2023-08-18 PROCEDURE — 76816 OB US FOLLOW-UP PER FETUS: CPT

## 2023-08-18 PROCEDURE — 76816 OB US FOLLOW-UP PER FETUS: CPT | Mod: 26 | Performed by: OBSTETRICS & GYNECOLOGY

## 2023-08-18 PROCEDURE — 76820 UMBILICAL ARTERY ECHO: CPT | Mod: 26 | Performed by: OBSTETRICS & GYNECOLOGY

## 2023-08-18 PROCEDURE — 59025 FETAL NON-STRESS TEST: CPT

## 2023-08-18 NOTE — NURSING NOTE
Patient reports active fetal movement, denies pain,  contractions, leaking of fluid, or bleeding.  Patient denies headache, visual changes, nausea/vomiting, epigastric pain related to preeclampsia.  SBAR given to MFDEJUAN MONTOYA, see their note in Epic.    NST Performed due to FGR.  Dr. Chisholm reviewed efm tracing. See NST/BPP Doc Flowsheet tab.

## 2023-08-22 ENCOUNTER — PRENATAL OFFICE VISIT (OUTPATIENT)
Dept: OBGYN | Facility: CLINIC | Age: 33
End: 2023-08-22
Payer: COMMERCIAL

## 2023-08-22 VITALS — BODY MASS INDEX: 30.73 KG/M2 | WEIGHT: 179 LBS | SYSTOLIC BLOOD PRESSURE: 108 MMHG | DIASTOLIC BLOOD PRESSURE: 58 MMHG

## 2023-08-22 DIAGNOSIS — O09.292 HISTORY OF PRE-ECLAMPSIA IN PRIOR PREGNANCY, CURRENTLY PREGNANT IN SECOND TRIMESTER: ICD-10-CM

## 2023-08-22 DIAGNOSIS — O36.5990 FETAL GROWTH RESTRICTION ANTEPARTUM: ICD-10-CM

## 2023-08-22 DIAGNOSIS — Z87.59 HISTORY OF IUFD: ICD-10-CM

## 2023-08-22 DIAGNOSIS — O09.90 SUPERVISION OF HIGH RISK PREGNANCY, ANTEPARTUM: Primary | ICD-10-CM

## 2023-08-22 PROCEDURE — 99207 PR PRENATAL VISIT: CPT | Performed by: OBSTETRICS & GYNECOLOGY

## 2023-08-22 NOTE — PROGRESS NOTES
CC: Here for routine prenatal visit   33 year old y/o  @ 29w5d with Estimated Date of Delivery: 2023     /58   Wt 81.2 kg (179 lb)   LMP 2023 (Exact Date)   BMI 30.73 kg/m    See OB flowsheet  + fetal movement, no contractions, no bleeding, no loss of fluid   Discussed monitoring fetal movement     1) concerns: having some dizziness add hydration, glucose through small meals, increasing hydration, check GDM and hemoglobin, will try eating more often   2) Routine: Blood type A+ RI tdap [ ] flu [x] GS [dec]   Covid v [x] GTT [ ]   3) Risk factors:   A: History of pre-eclampsia with IUFD @ 28 weeks:  Taking baby aspirin, baseline labs  normal              MFM consult  with normal level II us, serial growth recommended,               Previously they recommended ob visits q 2 weeks, growth 4 weeks, and weekly bpp 28 weeks               And we will continue this this pgy.               Neg APA testing  B: Anemia: slo-fe taking               C: PCOS: clomid for conception of pregnancies              D:  IUGR: @ 8th% weekly bpp with NST with dopplers with MFM , serial growth us     Patient Active Problem List   Diagnosis    H/O severe pre-eclampsia       4) Return: 2 weeks    Good fetal movement.  Reviewed last US.  Reviewed delivery at 38-39 weeks unless indicated sooner.  Will do tdap next visit.    Loren Fuller MD  [facility]

## 2023-08-22 NOTE — NURSING NOTE
"Chief Complaint   Patient presents with    Prenatal Care     29w 5d       Initial /58   Wt 81.2 kg (179 lb)   LMP 2023 (Exact Date)   BMI 30.73 kg/m   Estimated body mass index is 30.73 kg/m  as calculated from the following:    Height as of 8/3/23: 1.626 m (5' 4\").    Weight as of this encounter: 81.2 kg (179 lb).  BP completed using cuff size: regular long    Questioned patient about current smoking habits.  Pt. has never smoked.         Req tdap at next visit  "

## 2023-08-25 ENCOUNTER — OFFICE VISIT (OUTPATIENT)
Dept: MATERNAL FETAL MEDICINE | Facility: CLINIC | Age: 33
End: 2023-08-25
Attending: OBSTETRICS & GYNECOLOGY
Payer: COMMERCIAL

## 2023-08-25 ENCOUNTER — HOSPITAL ENCOUNTER (OUTPATIENT)
Dept: ULTRASOUND IMAGING | Facility: CLINIC | Age: 33
Discharge: HOME | End: 2023-08-25
Attending: OBSTETRICS & GYNECOLOGY
Payer: COMMERCIAL

## 2023-08-25 DIAGNOSIS — O36.5920 POOR FETAL GROWTH AFFECTING MANAGEMENT OF MOTHER IN SECOND TRIMESTER, SINGLE OR UNSPECIFIED FETUS: ICD-10-CM

## 2023-08-25 DIAGNOSIS — O36.5930 POOR FETAL GROWTH AFFECTING MANAGEMENT OF MOTHER IN THIRD TRIMESTER, SINGLE OR UNSPECIFIED FETUS: Primary | ICD-10-CM

## 2023-08-25 PROCEDURE — 59025 FETAL NON-STRESS TEST: CPT

## 2023-08-25 PROCEDURE — 76820 UMBILICAL ARTERY ECHO: CPT | Mod: 26 | Performed by: OBSTETRICS & GYNECOLOGY

## 2023-08-25 PROCEDURE — 59025 FETAL NON-STRESS TEST: CPT | Mod: 26 | Performed by: OBSTETRICS & GYNECOLOGY

## 2023-08-25 PROCEDURE — 76815 OB US LIMITED FETUS(S): CPT | Mod: 26 | Performed by: OBSTETRICS & GYNECOLOGY

## 2023-08-25 PROCEDURE — 76815 OB US LIMITED FETUS(S): CPT

## 2023-08-25 NOTE — PROGRESS NOTES
"Please see \"Imaging\" tab under \"Chart Review\" for details of today's US at the [facility].    Alexander Gaspar MD  Maternal-Fetal Medicine    "

## 2023-08-25 NOTE — NURSING NOTE
Patient presents to [facility] for NST/Limited US at 30w1d due to FGR. Positive fetal movement. Denies LOF, vaginal bleeding or cramping/contractions. SBAR given to PATRICIA MD, see their note in Epic.

## 2023-08-29 ENCOUNTER — HOSPITAL ENCOUNTER (OUTPATIENT)
Facility: CLINIC | Age: 33
Discharge: HOME | End: 2023-08-29
Attending: FAMILY MEDICINE | Admitting: FAMILY MEDICINE
Payer: COMMERCIAL

## 2023-08-29 ENCOUNTER — TRANSFERRED RECORDS (OUTPATIENT)
Dept: HEALTH INFORMATION MANAGEMENT | Facility: CLINIC | Age: 33
End: 2023-08-29
Payer: COMMERCIAL

## 2023-08-29 ENCOUNTER — HOSPITAL ENCOUNTER (OUTPATIENT)
Facility: CLINIC | Age: 33
End: 2023-08-29
Admitting: FAMILY MEDICINE
Payer: COMMERCIAL

## 2023-08-29 VITALS
BODY MASS INDEX: 30.56 KG/M2 | WEIGHT: 179 LBS | HEIGHT: 64 IN | TEMPERATURE: 98 F | SYSTOLIC BLOOD PRESSURE: 124 MMHG | DIASTOLIC BLOOD PRESSURE: 80 MMHG | HEART RATE: 87 BPM | RESPIRATION RATE: 16 BRPM

## 2023-08-29 DIAGNOSIS — Z36.89 ENCOUNTER FOR TRIAGE IN PREGNANT PATIENT: Primary | ICD-10-CM

## 2023-08-29 PROCEDURE — 59025 FETAL NON-STRESS TEST: CPT

## 2023-08-29 PROCEDURE — G0463 HOSPITAL OUTPT CLINIC VISIT: HCPCS | Mod: 25

## 2023-08-29 ASSESSMENT — ACTIVITIES OF DAILY LIVING (ADL): ADLS_ACUITY_SCORE: 31

## 2023-08-30 NOTE — PLAN OF CARE
Data: Patient presented to [facility]: 2023  6:22 PM.  Reason for maternal/fetal assessment is decreased fetal movement. Patient reports she hasn't been feeling the baby move as much over the past 2 days. Patient reports she has only felt 2 movements today and the baby is normally very active. Patient is a . Prenatal record reviewed. Pregnancy has been complicated by hx of preeclampsia with fetal demise, baby measuring small for gestational age.  Gestational Age 30w5d. VSS. Fetal movement decreased since yesterday.Patient denies uterine contractions, leaking of vaginal fluid/rupture of membranes, vaginal bleeding, abdominal pain, pelvic pressure, nausea, vomiting, headache, visual disturbances, epigastric or URQ pain, significant edema. Support person is present.   Action: Verbal consent for EFM. Triage assessment completed. Bill of rights reviewed. Verbal order from Dr. Gustafson to obtain NST.  Response: Patient verbalized agreement with plan. Will contact Dr Mercedez Gustafson with update and for further orders.

## 2023-08-30 NOTE — DISCHARGE INSTRUCTIONS
Discharge Instruction for Undelivered Patients      You were seen for: Fetal Assessment  We Consulted:   You had (Test or Medicine):reactive non stress test     Diet:   Drink 8 to 12 glasses of liquids (milk, juice, water) every day.  You may eat meals and snacks.     Activity:  Call your doctor or nurse midwife if your baby is moving less than usual.     Call your provider if you notice:  Swelling in your face or increased swelling in your hands or legs.  Headaches that are not relieved by Tylenol (acetaminophen).  Changes in your vision (blurring: seeing spots or stars.)  Nausea (sick to your stomach) and vomiting (throwing up).   Weight gain of 5 pounds or more per week.  Heartburn that doesn't go away.  Signs of bladder infection: pain when you urinate (use the toilet), need to go more often and more urgently.  The bag of callaway (rupture of membranes) breaks, or you notice leaking in your underwear.  Bright red blood in your underwear.  Abdominal (lower belly) or stomach pain...  Second (plus) baby: Contractions (tightening) less than 10 minutes apart and getting stronger.  *If less than 34 weeks: Contractions (tightening) more than 6 times in one hour.  Increase or change in vaginal discharge (note the color and amount)  Other: .    Follow-up:  As scheduled in the clinic           LMSW attempt to follow up with patient spouse Whitney, however no answer. LMSW unable to leave a message.

## 2023-08-30 NOTE — PLAN OF CARE
Data: Patient assessed in the [facility] for decreased fetal movement.  Cervical exam not examined.  Membranes intact.  Contractions/uterine assessment rare, not feeling them.  Action:  Presumed adequate fetal oxygenation documented (see flow record). Discharge instructions reviewed.  Patient instructed to report change in fetal movement, vaginal leaking of fluid or bleeding, abdominal pain, or any concerns related to the pregnancy to her nurse/physician.    Response: Orders to discharge home per Mercedez Gustafson.  Patient verbalized understanding of education and verbalized agreement with plan. Discharged to home at 2000.      Pt feeling baby move now- also can hear the movement audibly on the monitor. Pt feeling reassured. Has appt Friday for ultrasound this week.

## 2023-09-01 ENCOUNTER — OFFICE VISIT (OUTPATIENT)
Dept: MATERNAL FETAL MEDICINE | Facility: CLINIC | Age: 33
End: 2023-09-01
Attending: OBSTETRICS & GYNECOLOGY
Payer: COMMERCIAL

## 2023-09-01 ENCOUNTER — HOSPITAL ENCOUNTER (OUTPATIENT)
Dept: ULTRASOUND IMAGING | Facility: CLINIC | Age: 33
Discharge: HOME | End: 2023-09-01
Attending: OBSTETRICS & GYNECOLOGY
Payer: COMMERCIAL

## 2023-09-01 DIAGNOSIS — O36.5930 POOR FETAL GROWTH AFFECTING MANAGEMENT OF MOTHER IN THIRD TRIMESTER, SINGLE OR UNSPECIFIED FETUS: ICD-10-CM

## 2023-09-01 PROCEDURE — 76820 UMBILICAL ARTERY ECHO: CPT | Mod: 26 | Performed by: OBSTETRICS & GYNECOLOGY

## 2023-09-01 PROCEDURE — 59025 FETAL NON-STRESS TEST: CPT | Mod: 26 | Performed by: OBSTETRICS & GYNECOLOGY

## 2023-09-01 PROCEDURE — 76815 OB US LIMITED FETUS(S): CPT

## 2023-09-01 PROCEDURE — 76815 OB US LIMITED FETUS(S): CPT | Mod: 26 | Performed by: OBSTETRICS & GYNECOLOGY

## 2023-09-01 PROCEDURE — 59025 FETAL NON-STRESS TEST: CPT

## 2023-09-01 NOTE — NURSING NOTE
Patient presents to [facility] for NST/limited/UAR at 31w1d due to FGR. Positive fetal movement. Denies LOF, vaginal bleeding or cramping/contractions. SBAR given to M MD, see their note in Epic.

## 2023-09-08 ENCOUNTER — HOSPITAL ENCOUNTER (OUTPATIENT)
Dept: ULTRASOUND IMAGING | Facility: CLINIC | Age: 33
Discharge: HOME | End: 2023-09-08
Attending: OBSTETRICS & GYNECOLOGY
Payer: COMMERCIAL

## 2023-09-08 ENCOUNTER — OFFICE VISIT (OUTPATIENT)
Dept: MATERNAL FETAL MEDICINE | Facility: CLINIC | Age: 33
End: 2023-09-08
Attending: OBSTETRICS & GYNECOLOGY
Payer: COMMERCIAL

## 2023-09-08 DIAGNOSIS — O36.5930 POOR FETAL GROWTH AFFECTING MANAGEMENT OF MOTHER IN THIRD TRIMESTER, SINGLE OR UNSPECIFIED FETUS: ICD-10-CM

## 2023-09-08 DIAGNOSIS — O36.5930 POOR FETAL GROWTH AFFECTING MANAGEMENT OF MOTHER IN THIRD TRIMESTER, SINGLE OR UNSPECIFIED FETUS: Primary | ICD-10-CM

## 2023-09-08 DIAGNOSIS — O09.299 HISTORY OF PRE-ECLAMPSIA IN PRIOR PREGNANCY, CURRENTLY PREGNANT: ICD-10-CM

## 2023-09-08 PROCEDURE — 76819 FETAL BIOPHYS PROFIL W/O NST: CPT | Mod: 26 | Performed by: OBSTETRICS & GYNECOLOGY

## 2023-09-08 PROCEDURE — 76819 FETAL BIOPHYS PROFIL W/O NST: CPT

## 2023-09-08 PROCEDURE — 76816 OB US FOLLOW-UP PER FETUS: CPT | Mod: 26 | Performed by: OBSTETRICS & GYNECOLOGY

## 2023-09-08 PROCEDURE — 59025 FETAL NON-STRESS TEST: CPT

## 2023-09-08 NOTE — NURSING NOTE
Elidia presents to [facility] for follow up ultrasound and monitoring due to FGR. Patient reports good fetal movement, denies contractions, leaking of fluid, or bleeding.      NST Performed due to FGR.   reviewed efm tracing. See NST/BPP Doc Flowsheet tab.

## 2023-09-08 NOTE — PROGRESS NOTES
"Please see \"Imaging\" tab under \"Chart Review\" for details of today's US at the [facility].    Alexander Gaspar MD  Maternal-Fetal Medicine    "

## 2023-09-11 ENCOUNTER — PRENATAL OFFICE VISIT (OUTPATIENT)
Dept: OBGYN | Facility: CLINIC | Age: 33
End: 2023-09-11
Payer: COMMERCIAL

## 2023-09-11 VITALS
HEIGHT: 64 IN | SYSTOLIC BLOOD PRESSURE: 98 MMHG | DIASTOLIC BLOOD PRESSURE: 60 MMHG | WEIGHT: 182.3 LBS | BODY MASS INDEX: 31.12 KG/M2

## 2023-09-11 DIAGNOSIS — Z87.59 HISTORY OF IUFD: ICD-10-CM

## 2023-09-11 DIAGNOSIS — O09.90 SUPERVISION OF HIGH RISK PREGNANCY, ANTEPARTUM: Primary | ICD-10-CM

## 2023-09-11 DIAGNOSIS — O36.5990 FETAL GROWTH RESTRICTION ANTEPARTUM: ICD-10-CM

## 2023-09-11 PROCEDURE — 90715 TDAP VACCINE 7 YRS/> IM: CPT | Performed by: FAMILY MEDICINE

## 2023-09-11 PROCEDURE — 90471 IMMUNIZATION ADMIN: CPT | Performed by: FAMILY MEDICINE

## 2023-09-11 PROCEDURE — 99207 PR PRENATAL VISIT: CPT | Performed by: FAMILY MEDICINE

## 2023-09-11 NOTE — NURSING NOTE
"32w4d    Chief Complaint   Patient presents with    Prenatal Care       Initial BP 98/60   Ht 1.626 m (5' 4\")   Wt 82.7 kg (182 lb 4.8 oz)   LMP 2023 (Exact Date)   BMI 31.29 kg/m   Estimated body mass index is 31.29 kg/m  as calculated from the following:    Height as of this encounter: 1.626 m (5' 4\").    Weight as of this encounter: 82.7 kg (182 lb 4.8 oz).  BP completed using cuff size: regular    Questioned patient about current smoking habits.  Pt. has never smoked.          The following HM Due: NONE      "

## 2023-09-11 NOTE — PATIENT INSTRUCTIONS
"Return 2 weeks   Return to clinic:  every 4 weeks till 28 weeks, then every 2 weeks till 36 weeks, then weekly till delivery      Phone numbers [place]:  Day/ night [phone] ask for ob triage  Emergency:  Call labor and delivery:  [phone]    What should I call about??    Contraction every 5 minutes for 1 hour 1 minute long (511), bleeding, loss of fluid, headache that doesn't resolve with tylenol, and decreased fetal movement     Start kick counts @ 26-28 weeks   There is an hamzah for this!  It is called \"count the kicks\"  Keep track of movement and discover your normal baby movement pattern   guideline is listed below  Please call if you do not feel the baby move!  We will have you come in for fetal heart rate monitoring:   Perception of at least 10 FMs during 12 hours of normal maternal activity   Perception of least 10 FMs over two hours when the mother is at rest and focused on [facility] Address   [address], [place], [place] [address] [phone]    Dr. Mercedez Gustafson, DO    OB/GYN   [facility]                                                    "

## 2023-09-11 NOTE — PROGRESS NOTES
"CC: Here for routine prenatal visit   33 year old y/o  @ 32w4d with Estimated Date of Delivery: 2023     BP 98/60   Ht 1.626 m (5' 4\")   Wt 82.7 kg (182 lb 4.8 oz)   LMP 2023 (Exact Date)   BMI 31.29 kg/m     See OB flowsheet  + fetal movement, no contractions, no bleeding, no loss of fluid   Discussed monitoring fetal movement     1) concerns: doing well   2) Routine: Blood type A+ RI tdap [ ] flu [x] GS [dec]   Covid v [x] GTT [ ]   3) Risk factors:   A: History of pre-eclampsia with IUFD @ 28 weeks:  Taking baby aspirin, baseline labs  normal              MFM consult  with normal level II us, serial growth recommended,               Previously they recommended ob visits q 2 weeks, growth 4 weeks, and weekly bpp 28 weeks               And we will continue this this pgy.               Neg APA testing  B: Anemia: slo-fe taking               C: PCOS: clomid for conception of pregnancies              D:  IUGR, now AGA : @ 14th% serial growth in 3 weeks    4) Return: 2 weeks     Tillfranklyn     "

## 2023-09-15 ENCOUNTER — OFFICE VISIT (OUTPATIENT)
Dept: MATERNAL FETAL MEDICINE | Facility: CLINIC | Age: 33
End: 2023-09-15
Attending: OBSTETRICS & GYNECOLOGY
Payer: COMMERCIAL

## 2023-09-15 ENCOUNTER — HOSPITAL ENCOUNTER (OUTPATIENT)
Dept: ULTRASOUND IMAGING | Facility: CLINIC | Age: 33
Discharge: HOME | End: 2023-09-15
Attending: OBSTETRICS & GYNECOLOGY
Payer: COMMERCIAL

## 2023-09-15 ENCOUNTER — HOSPITAL ENCOUNTER (OUTPATIENT)
Dept: ULTRASOUND IMAGING | Facility: CLINIC | Age: 33
Discharge: HOME | End: 2023-09-15
Payer: COMMERCIAL

## 2023-09-15 DIAGNOSIS — Z87.59 HISTORY OF IUFD: Primary | ICD-10-CM

## 2023-09-15 DIAGNOSIS — R92.8 BI-RADS CATEGORY 3 MAMMOGRAM RESULT: ICD-10-CM

## 2023-09-15 DIAGNOSIS — O36.5930 POOR FETAL GROWTH AFFECTING MANAGEMENT OF MOTHER IN THIRD TRIMESTER, SINGLE OR UNSPECIFIED FETUS: ICD-10-CM

## 2023-09-15 PROCEDURE — 76819 FETAL BIOPHYS PROFIL W/O NST: CPT

## 2023-09-15 PROCEDURE — 76819 FETAL BIOPHYS PROFIL W/O NST: CPT | Mod: 26 | Performed by: OBSTETRICS & GYNECOLOGY

## 2023-09-15 PROCEDURE — 76642 ULTRASOUND BREAST LIMITED: CPT | Mod: LT

## 2023-09-15 NOTE — PROGRESS NOTES
"Please see \"Imaging\" tab under \"Chart Review\" for details of today's US at the [facility].    Alexander Gaspar MD  Maternal-Fetal Medicine    "

## 2023-09-20 ENCOUNTER — HOSPITAL ENCOUNTER (OUTPATIENT)
Dept: ULTRASOUND IMAGING | Facility: CLINIC | Age: 33
Discharge: HOME | End: 2023-09-20
Attending: OBSTETRICS & GYNECOLOGY
Payer: COMMERCIAL

## 2023-09-20 ENCOUNTER — OFFICE VISIT (OUTPATIENT)
Dept: MATERNAL FETAL MEDICINE | Facility: CLINIC | Age: 33
End: 2023-09-20
Attending: OBSTETRICS & GYNECOLOGY
Payer: COMMERCIAL

## 2023-09-20 DIAGNOSIS — O09.299 HISTORY OF PRE-ECLAMPSIA IN PRIOR PREGNANCY, CURRENTLY PREGNANT: ICD-10-CM

## 2023-09-20 DIAGNOSIS — O09.893 H/O PRETERM DELIVERY, CURRENTLY PREGNANT, THIRD TRIMESTER: Primary | ICD-10-CM

## 2023-09-20 PROCEDURE — 76819 FETAL BIOPHYS PROFIL W/O NST: CPT | Mod: 26 | Performed by: OBSTETRICS & GYNECOLOGY

## 2023-09-20 PROCEDURE — 76819 FETAL BIOPHYS PROFIL W/O NST: CPT

## 2023-09-20 NOTE — NURSING NOTE
Patient presents to [facility] for BPP at 33w6d due to hx PreE and hx of IUFD at 28w. Positive fetal movement. Denies LOF, vaginal bleeding or cramping/contractions. SBAR given to [facility] MD, see their note in Epic.

## 2023-09-25 ENCOUNTER — PRENATAL OFFICE VISIT (OUTPATIENT)
Dept: OBGYN | Facility: CLINIC | Age: 33
End: 2023-09-25
Payer: COMMERCIAL

## 2023-09-25 VITALS
HEIGHT: 64 IN | WEIGHT: 181.9 LBS | SYSTOLIC BLOOD PRESSURE: 116 MMHG | BODY MASS INDEX: 31.05 KG/M2 | DIASTOLIC BLOOD PRESSURE: 60 MMHG

## 2023-09-25 DIAGNOSIS — O09.293 HISTORY OF PRE-ECLAMPSIA IN PRIOR PREGNANCY, CURRENTLY PREGNANT IN THIRD TRIMESTER: ICD-10-CM

## 2023-09-25 DIAGNOSIS — O09.90 SUPERVISION OF HIGH RISK PREGNANCY, ANTEPARTUM: ICD-10-CM

## 2023-09-25 DIAGNOSIS — Z87.59 HISTORY OF IUFD: Primary | ICD-10-CM

## 2023-09-25 PROCEDURE — 99207 PR PRENATAL VISIT: CPT | Performed by: FAMILY MEDICINE

## 2023-09-25 NOTE — PATIENT INSTRUCTIONS
"Return weekly   Return to clinic:  every 4 weeks till 28 weeks, then every 2 weeks till 36 weeks, then weekly till delivery      Phone numbers [place]:  Day/ night [phone] ask for ob triage  Emergency:  Call labor and delivery:  [phone]    What should I call about??    Contraction every 5 minutes for 1 hour 1 minute long (511), bleeding, loss of fluid, headache that doesn't resolve with tylenol, and decreased fetal movement     Start kick counts @ 26-28 weeks   There is an hamzah for this!  It is called \"count the kicks\"  Keep track of movement and discover your normal baby movement pattern   guideline is listed below  Please call if you do not feel the baby move!  We will have you come in for fetal heart rate monitoring:   Perception of at least 10 FMs during 12 hours of normal maternal activity   Perception of least 10 FMs over two hours when the mother is at rest and focused on [facility] Address   [address], [place], [place] [address] [phone]    Dr. Mercedez Gustafson, DO    OB/GYN   [facility]                                                    "

## 2023-09-25 NOTE — PROGRESS NOTES
"CC: Here for routine prenatal visit   33 year old y/o  @ 34w4d with Estimated Date of Delivery: 2023     /60   Ht 1.626 m (5' 4\")   Wt 82.5 kg (181 lb 14.4 oz)   LMP 2023 (Exact Date)   BMI 31.22 kg/m    See OB flowsheet  + fetal movement, no contractions, no bleeding, no loss of fluid   Discussed monitoring fetal movement     1) concerns: discussed IOL   2) Routine: Blood type A+ RI tdap [ ] flu [x] GS [dec]   Covid v [x] GTT [ ]   3) Risk factors:   A: History of pre-eclampsia with IUFD @ 28 weeks:  Taking baby aspirin, baseline labs  normal              MFM consult  with normal level II us, serial growth recommended,               Previously they recommended ob visits q 2 weeks, growth 4 weeks, and weekly bpp 28 weeks               And we will continue this this pgy.               Neg APA testing   Discussed IOL planning:  she would like IOL  @ 39 weeks, unless medical indication  B: Anemia: slo-fe taking               C: PCOS: clomid for conception of pregnancies              D:  IUGR, now AGA : @ 14th% serial growth in 3 weeks     Patient Active Problem List   Diagnosis    H/O severe pre-eclampsia    Encounter for triage in pregnant patient       4) Return: weekly     Tillboos     "

## 2023-09-25 NOTE — NURSING NOTE
"34w4d    Chief Complaint   Patient presents with    Prenatal Care     Wonders if she will be induced again       Initial /60   Ht 1.626 m (5' 4\")   Wt 82.5 kg (181 lb 14.4 oz)   LMP 2023 (Exact Date)   BMI 31.22 kg/m   Estimated body mass index is 31.22 kg/m  as calculated from the following:    Height as of this encounter: 1.626 m (5' 4\").    Weight as of this encounter: 82.5 kg (181 lb 14.4 oz).  BP completed using cuff size: regular    Questioned patient about current smoking habits.  Pt. has never smoked.          The following HM Due: NONE  "

## 2023-09-29 ENCOUNTER — OFFICE VISIT (OUTPATIENT)
Dept: MATERNAL FETAL MEDICINE | Facility: CLINIC | Age: 33
End: 2023-09-29
Attending: OBSTETRICS & GYNECOLOGY
Payer: COMMERCIAL

## 2023-09-29 ENCOUNTER — HOSPITAL ENCOUNTER (OUTPATIENT)
Dept: ULTRASOUND IMAGING | Facility: CLINIC | Age: 33
Discharge: HOME | End: 2023-09-29
Attending: OBSTETRICS & GYNECOLOGY
Payer: COMMERCIAL

## 2023-09-29 DIAGNOSIS — O36.5930 POOR FETAL GROWTH AFFECTING MANAGEMENT OF MOTHER IN THIRD TRIMESTER, SINGLE OR UNSPECIFIED FETUS: ICD-10-CM

## 2023-09-29 DIAGNOSIS — O09.299 HISTORY OF PRE-ECLAMPSIA IN PRIOR PREGNANCY, CURRENTLY PREGNANT: ICD-10-CM

## 2023-09-29 DIAGNOSIS — O09.293 HISTORY OF STILLBIRTH IN CURRENTLY PREGNANT PATIENT, THIRD TRIMESTER: ICD-10-CM

## 2023-09-29 DIAGNOSIS — Z87.898 HISTORY OF POOR FETAL GROWTH: Primary | ICD-10-CM

## 2023-09-29 PROCEDURE — 76816 OB US FOLLOW-UP PER FETUS: CPT

## 2023-09-29 PROCEDURE — 76816 OB US FOLLOW-UP PER FETUS: CPT | Mod: 26 | Performed by: OBSTETRICS & GYNECOLOGY

## 2023-09-29 PROCEDURE — 76819 FETAL BIOPHYS PROFIL W/O NST: CPT | Mod: 26 | Performed by: OBSTETRICS & GYNECOLOGY

## 2023-09-29 NOTE — PROGRESS NOTES
Please see the imaging tab for details of the ultrasound performed today.    Bere Hagan MD  Specialist in Maternal-Fetal Medicine

## 2023-09-29 NOTE — NURSING NOTE
Patient presents to [facility] for RL2/BPP at 35w1d due to hx pre-e with IUFD at 28 weeks, hx FGR this pregancy . Positive fetal movement. Denies LOF, vaginal bleeding or cramping/contractions. SBAR given to [facility] MD, see their note in Epic.

## 2023-10-04 ENCOUNTER — PRENATAL OFFICE VISIT (OUTPATIENT)
Dept: MIDWIFE SERVICES | Facility: CLINIC | Age: 33
End: 2023-10-04
Payer: COMMERCIAL

## 2023-10-04 ENCOUNTER — OFFICE VISIT (OUTPATIENT)
Dept: MATERNAL FETAL MEDICINE | Facility: CLINIC | Age: 33
End: 2023-10-04
Attending: OBSTETRICS & GYNECOLOGY
Payer: COMMERCIAL

## 2023-10-04 ENCOUNTER — HOSPITAL ENCOUNTER (OUTPATIENT)
Dept: ULTRASOUND IMAGING | Facility: CLINIC | Age: 33
Discharge: HOME | End: 2023-10-04
Attending: OBSTETRICS & GYNECOLOGY
Payer: COMMERCIAL

## 2023-10-04 VITALS — DIASTOLIC BLOOD PRESSURE: 60 MMHG | WEIGHT: 187.4 LBS | SYSTOLIC BLOOD PRESSURE: 120 MMHG | BODY MASS INDEX: 32.17 KG/M2

## 2023-10-04 DIAGNOSIS — O09.299 HISTORY OF PRE-ECLAMPSIA IN PRIOR PREGNANCY, CURRENTLY PREGNANT: ICD-10-CM

## 2023-10-04 DIAGNOSIS — O09.293 HISTORY OF STILLBIRTH IN CURRENTLY PREGNANT PATIENT, THIRD TRIMESTER: Primary | ICD-10-CM

## 2023-10-04 DIAGNOSIS — O09.293 HISTORY OF STILLBIRTH IN CURRENTLY PREGNANT PATIENT, THIRD TRIMESTER: ICD-10-CM

## 2023-10-04 DIAGNOSIS — Z87.898 HISTORY OF POOR FETAL GROWTH: ICD-10-CM

## 2023-10-04 DIAGNOSIS — O09.93 SUPERVISION OF HIGH RISK PREGNANCY IN THIRD TRIMESTER: Primary | ICD-10-CM

## 2023-10-04 PROCEDURE — 99207 PR PRENATAL VISIT: CPT | Performed by: ADVANCED PRACTICE MIDWIFE

## 2023-10-04 PROCEDURE — 76819 FETAL BIOPHYS PROFIL W/O NST: CPT

## 2023-10-04 PROCEDURE — 76819 FETAL BIOPHYS PROFIL W/O NST: CPT | Mod: 26 | Performed by: OBSTETRICS & GYNECOLOGY

## 2023-10-04 NOTE — NURSING NOTE
"Chief Complaint   Patient presents with    Prenatal Care     35 weeks 6 days, no c/o VB, LoF, cramping/contractions. Feeling FM daily. Has noticed increased pelvic pressure after sitting for extended periods of time. No questions or concerns         Initial /60   Wt 85 kg (187 lb 6.4 oz)   LMP 2023 (Exact Date)   BMI 32.17 kg/m   Estimated body mass index is 32.17 kg/m  as calculated from the following:    Height as of 23: 1.626 m (5' 4\").    Weight as of this encounter: 85 kg (187 lb 6.4 oz).  BP completed using cuff size: regular    Questioned patient about current smoking habits.  Pt. has never smoked.          The following HM Due: NONE    Mary Dunbar CMA               "

## 2023-10-04 NOTE — PROGRESS NOTES
The patient was seen for an ultrasound in the Maternal-Fetal Medicine Center at the [facility] today.  For a detailed report of the ultrasound examination, please see the ultrasound report which can be found under the imaging tab.    If you have questions regarding today's evaluation or if we can be of further service, please contact the Maternal-Fetal Medicine Center.    Magnolia John MD  , OB/GYN  Maternal-Fetal Medicine  [phone] (Pager)

## 2023-10-04 NOTE — PROGRESS NOTES
S: Feels well,  Baby active.  Denies uterine cramping, vaginal bleeding or leaking of fluid.     US with MFM prior to today's visit, follows for hx IUGR, hx stillbirth at 28wks d/t severe preeclampsia.   BPP   Declines flu vaccine today    O: Vitals: /60   Wt 85 kg (187 lb 6.4 oz)   LMP 2023 (Exact Date)   BMI 32.17 kg/m    BMI= Body mass index is 32.17 kg/m .  Exam:  Constitutional: healthy, alert and no distress  Respiratory: respirations even and unlabored  Gastrointestinal: Abdomen soft, non-tender. Fundus measures appropriate for gestational age. Fetal heart tones hear without difficulty and within normal limits  : Deferred  Psychiatric: mentation appears normal and affect normal/bright  A:     ICD-10-CM    1. Supervision of high risk pregnancy in third trimester  O09.93         P: Discussed GBS screen for next visit.   Continue  testing per MFM  Encouraged patient to call with any questions or concerns.  Return to clinic 2 weeks    DARIAN Suggs, COURTNEYM

## 2023-10-09 ENCOUNTER — PRENATAL OFFICE VISIT (OUTPATIENT)
Dept: OBGYN | Facility: CLINIC | Age: 33
End: 2023-10-09
Payer: COMMERCIAL

## 2023-10-09 VITALS
BODY MASS INDEX: 32.47 KG/M2 | SYSTOLIC BLOOD PRESSURE: 112 MMHG | HEIGHT: 64 IN | DIASTOLIC BLOOD PRESSURE: 70 MMHG | WEIGHT: 190.2 LBS

## 2023-10-09 DIAGNOSIS — O09.293 HISTORY OF PRE-ECLAMPSIA IN PRIOR PREGNANCY, CURRENTLY PREGNANT IN THIRD TRIMESTER: ICD-10-CM

## 2023-10-09 DIAGNOSIS — Z87.59 HISTORY OF IUFD: ICD-10-CM

## 2023-10-09 DIAGNOSIS — O09.90 SUPERVISION OF HIGH RISK PREGNANCY, ANTEPARTUM: Primary | ICD-10-CM

## 2023-10-09 DIAGNOSIS — D50.9 IRON DEFICIENCY ANEMIA, UNSPECIFIED IRON DEFICIENCY ANEMIA TYPE: ICD-10-CM

## 2023-10-09 PROCEDURE — 87653 STREP B DNA AMP PROBE: CPT | Performed by: FAMILY MEDICINE

## 2023-10-09 PROCEDURE — 99207 PR PRENATAL VISIT: CPT | Performed by: FAMILY MEDICINE

## 2023-10-09 NOTE — PATIENT INSTRUCTIONS
"Return weekly   Return to clinic:  every 4 weeks till 28 weeks, then every 2 weeks till 36 weeks, then weekly till delivery      Phone numbers [place]:  Day/ night [phone] ask for ob triage  Emergency:  Call labor and delivery:  [phone]    What should I call about??    Contraction every 5 minutes for 1 hour 1 minute long (511), bleeding, loss of fluid, headache that doesn't resolve with tylenol, and decreased fetal movement     Start kick counts @ 26-28 weeks   There is an hamzah for this!  It is called \"count the kicks\"  Keep track of movement and discover your normal baby movement pattern   guideline is listed below  Please call if you do not feel the baby move!  We will have you come in for fetal heart rate monitoring:   Perception of at least 10 FMs during 12 hours of normal maternal activity   Perception of least 10 FMs over two hours when the mother is at rest and focused on [facility] Address   [address], [place], [place] [address] [phone]    Dr. Mercedez Gustafson, DO    OB/GYN   [facility]                                                    "

## 2023-10-09 NOTE — PROGRESS NOTES
"CC: Here for routine prenatal visit   33 year old y/o  @ 36w4d with Estimated Date of Delivery: 2023     Ht 1.626 m (5' 4\")   LMP 2023 (Exact Date)   BMI 32.17 kg/m     See OB flowsheet  + fetal movement, no contractions, no bleeding, no loss of fluid   Discussed monitoring fetal movement     1) concerns: doing well   2) Routine: Blood type A+ RI tdap [ ] flu [x] GS [dec]   Covid v [x] GTT [ ]   3) Risk factors:   A: History of pre-eclampsia with IUFD @ 28 weeks:  Taking baby aspirin, baseline labs  normal              MFM consult  with normal level II us, serial growth recommended,   Previously they recommended ob visits q 2 weeks, growth 4 weeks, and weekly bpp 28 weeks               And we will continue this this pgy.               Neg APA testing              Discussed IOL planning:  she would like IOL  @ 39 weeks, unless medical indication  B: Anemia: slo-fe taking               C: PCOS: clomid for conception of pregnancies              D:  IUGR, now AGA : @ 14th% serial growth in 3 weeks    4) Return: weekly    Janay     "

## 2023-10-11 LAB — GP B STREP DNA SPEC QL NAA+PROBE: NEGATIVE

## 2023-10-13 ENCOUNTER — HOSPITAL ENCOUNTER (OUTPATIENT)
Dept: ULTRASOUND IMAGING | Facility: CLINIC | Age: 33
Discharge: HOME | End: 2023-10-13
Attending: OBSTETRICS & GYNECOLOGY
Payer: COMMERCIAL

## 2023-10-13 ENCOUNTER — OFFICE VISIT (OUTPATIENT)
Dept: MATERNAL FETAL MEDICINE | Facility: CLINIC | Age: 33
End: 2023-10-13
Attending: OBSTETRICS & GYNECOLOGY
Payer: COMMERCIAL

## 2023-10-13 DIAGNOSIS — O09.293 HISTORY OF STILLBIRTH IN CURRENTLY PREGNANT PATIENT, THIRD TRIMESTER: ICD-10-CM

## 2023-10-13 DIAGNOSIS — O09.299 HISTORY OF PRE-ECLAMPSIA IN PRIOR PREGNANCY, CURRENTLY PREGNANT: ICD-10-CM

## 2023-10-13 DIAGNOSIS — Z87.898 HISTORY OF POOR FETAL GROWTH: ICD-10-CM

## 2023-10-13 DIAGNOSIS — O09.293 HISTORY OF STILLBIRTH IN CURRENTLY PREGNANT PATIENT, THIRD TRIMESTER: Primary | ICD-10-CM

## 2023-10-13 PROCEDURE — 76819 FETAL BIOPHYS PROFIL W/O NST: CPT | Mod: 26 | Performed by: OBSTETRICS & GYNECOLOGY

## 2023-10-13 PROCEDURE — 76819 FETAL BIOPHYS PROFIL W/O NST: CPT

## 2023-10-13 NOTE — PROGRESS NOTES
"Please see \"Imaging\" tab under \"Chart Review\" for details of today's US at the [facility].    Alexander Gaspar MD  Maternal-Fetal Medicine    "

## 2023-10-13 NOTE — NURSING NOTE
Patient reports good fetal movement,  denies contractions, leaking of fluid, or bleeding.  R Patient denies headache, visual changes, nausea/vomiting, epigastric pain related to preeclampsia.  Education provided to patient on s/s pre-e and follow up US in one week.  SBAR given to PATRICIA MONTOYA, see their note in Epic.       (0) Normal symmetrical movements

## 2023-10-16 ENCOUNTER — PRENATAL OFFICE VISIT (OUTPATIENT)
Dept: OBGYN | Facility: CLINIC | Age: 33
End: 2023-10-16
Payer: COMMERCIAL

## 2023-10-16 VITALS
SYSTOLIC BLOOD PRESSURE: 120 MMHG | WEIGHT: 192 LBS | HEIGHT: 64 IN | DIASTOLIC BLOOD PRESSURE: 78 MMHG | BODY MASS INDEX: 32.78 KG/M2

## 2023-10-16 DIAGNOSIS — Z87.59 HISTORY OF IUFD: ICD-10-CM

## 2023-10-16 DIAGNOSIS — O09.90 SUPERVISION OF HIGH RISK PREGNANCY, ANTEPARTUM: Primary | ICD-10-CM

## 2023-10-16 DIAGNOSIS — O09.292 HISTORY OF PRE-ECLAMPSIA IN PRIOR PREGNANCY, CURRENTLY PREGNANT IN SECOND TRIMESTER: ICD-10-CM

## 2023-10-16 DIAGNOSIS — O36.5990 FETAL GROWTH RESTRICTION ANTEPARTUM: ICD-10-CM

## 2023-10-16 DIAGNOSIS — O09.293 HISTORY OF PRE-ECLAMPSIA IN PRIOR PREGNANCY, CURRENTLY PREGNANT IN THIRD TRIMESTER: ICD-10-CM

## 2023-10-16 PROCEDURE — 99207 PR PRENATAL VISIT: CPT | Performed by: FAMILY MEDICINE

## 2023-10-16 RX ORDER — ASPIRIN 81 MG/1
81 TABLET ORAL DAILY
Qty: 30 TABLET | Refills: 1 | Status: ON HOLD | OUTPATIENT
Start: 2023-10-16 | End: 2023-10-28

## 2023-10-16 RX ORDER — METAPROTERENOL SULFATE 10 MG
500 TABLET ORAL DAILY
Qty: 30 CAPSULE | Refills: 11 | Status: SHIPPED | OUTPATIENT
Start: 2023-10-16

## 2023-10-16 NOTE — NURSING NOTE
"Chief Complaint   Patient presents with    Prenatal Care       Initial /78   Ht 1.626 m (5' 4\")   Wt 87.1 kg (192 lb)   LMP 2023 (Exact Date)   BMI 32.96 kg/m   Estimated body mass index is 32.96 kg/m  as calculated from the following:    Height as of this encounter: 1.626 m (5' 4\").    Weight as of this encounter: 87.1 kg (192 lb).  BP completed using cuff size: regular    Questioned patient about current smoking habits.  Pt. has never smoked.          The following HM Due: NONE      "

## 2023-10-16 NOTE — PROGRESS NOTES
"CC: Here for routine prenatal visit   33 year old y/o  @ 37w4d with Estimated Date of Delivery: 2023     /78   Ht 1.626 m (5' 4\")   Wt 87.1 kg (192 lb)   LMP 2023 (Exact Date)   BMI 32.96 kg/m     See OB flowsheet  + fetal movement, no contractions, no bleeding, no loss of fluid   Discussed monitoring fetal movement     1) concerns:   2) Routine: Blood type A+ RI tdap [ ] flu [x] GS [dec]   Covid v [x] GTT [ ]   3) Risk factors:   A: History of pre-eclampsia with IUFD @ 28 weeks:  Taking baby aspirin, baseline labs  normal              MFM consult  with normal level II us, serial growth recommended,   Previously they recommended ob visits q 2 weeks, growth 4 weeks, and weekly bpp 28 weeks               And we will continue this this pgy.               Neg APA testing              Discussed IOL planning:  she would like IOL  @ 39 weeks, unless medical indication  B: Anemia: slo-fe taking               C: PCOS: clomid for conception of pregnancies              D:  IUGR, now AGA : @ 14th% serial growth in 3 weeks    4) Return: weekly, planning IOL @ 39 weeks    Tillfranklyn     "

## 2023-10-16 NOTE — PATIENT INSTRUCTIONS
"Return weekly   Nalini will call to help you set it up   Return to clinic:  every 4 weeks till 28 weeks, then every 2 weeks till 36 weeks, then weekly till delivery      Phone numbers [place]:  Day/ night [phone] ask for ob triage  Emergency:  Call labor and delivery:  [phone]    What should I call about??    Contraction every 5 minutes for 1 hour 1 minute long (511), bleeding, loss of fluid, headache that doesn't resolve with tylenol, and decreased fetal movement     Start kick counts @ 26-28 weeks   There is an hamzah for this!  It is called \"count the kicks\"  Keep track of movement and discover your normal baby movement pattern   guideline is listed below  Please call if you do not feel the baby move!  We will have you come in for fetal heart rate monitoring:   Perception of at least 10 FMs during 12 hours of normal maternal activity   Perception of least 10 FMs over two hours when the mother is at rest and focused on [facility] Address   [address], [place] [address]  [phone]    Dr. Mercedez Gustafson, DO    OB/GYN   [facility]                                                    "

## 2023-10-19 ENCOUNTER — TELEPHONE (OUTPATIENT)
Dept: OBGYN | Facility: CLINIC | Age: 33
End: 2023-10-19
Payer: COMMERCIAL

## 2023-10-19 NOTE — TELEPHONE ENCOUNTER
Pt scheduled for medical induction on 10/27/23. I called pt and told her that her induction was set up and I gave her L/D's telephone number. I advised her to call L/D at 6:30am and she would be instructed when to come in (usually at 7:30am).    Pt took down and number and understood the instructions.    Induction form faxed to L/D.    Clarisa Rhodes CMA

## 2023-10-20 ENCOUNTER — HOSPITAL ENCOUNTER (OUTPATIENT)
Dept: ULTRASOUND IMAGING | Facility: CLINIC | Age: 33
Discharge: HOME | End: 2023-10-20
Attending: OBSTETRICS & GYNECOLOGY
Payer: COMMERCIAL

## 2023-10-20 ENCOUNTER — OFFICE VISIT (OUTPATIENT)
Dept: MATERNAL FETAL MEDICINE | Facility: CLINIC | Age: 33
End: 2023-10-20
Attending: OBSTETRICS & GYNECOLOGY
Payer: COMMERCIAL

## 2023-10-20 DIAGNOSIS — Z87.898 HISTORY OF POOR FETAL GROWTH: ICD-10-CM

## 2023-10-20 DIAGNOSIS — Z87.59 HISTORY OF IUFD: Primary | ICD-10-CM

## 2023-10-20 DIAGNOSIS — O09.299 HISTORY OF PRE-ECLAMPSIA IN PRIOR PREGNANCY, CURRENTLY PREGNANT: ICD-10-CM

## 2023-10-20 DIAGNOSIS — O09.293 HISTORY OF STILLBIRTH IN CURRENTLY PREGNANT PATIENT, THIRD TRIMESTER: ICD-10-CM

## 2023-10-20 PROCEDURE — 76819 FETAL BIOPHYS PROFIL W/O NST: CPT | Mod: 26 | Performed by: STUDENT IN AN ORGANIZED HEALTH CARE EDUCATION/TRAINING PROGRAM

## 2023-10-20 PROCEDURE — 76816 OB US FOLLOW-UP PER FETUS: CPT | Mod: 26 | Performed by: STUDENT IN AN ORGANIZED HEALTH CARE EDUCATION/TRAINING PROGRAM

## 2023-10-20 PROCEDURE — 99213 OFFICE O/P EST LOW 20 MIN: CPT | Mod: 25 | Performed by: STUDENT IN AN ORGANIZED HEALTH CARE EDUCATION/TRAINING PROGRAM

## 2023-10-20 PROCEDURE — 76819 FETAL BIOPHYS PROFIL W/O NST: CPT

## 2023-10-20 NOTE — PROGRESS NOTES
"Please see \"Imaging\" tab under \"Chart Review\" for details of today's visit.    Olivia Werner    "

## 2023-10-23 ENCOUNTER — PRENATAL OFFICE VISIT (OUTPATIENT)
Dept: OBGYN | Facility: CLINIC | Age: 33
End: 2023-10-23
Payer: COMMERCIAL

## 2023-10-23 VITALS — WEIGHT: 189 LBS | DIASTOLIC BLOOD PRESSURE: 80 MMHG | BODY MASS INDEX: 32.44 KG/M2 | SYSTOLIC BLOOD PRESSURE: 120 MMHG

## 2023-10-23 DIAGNOSIS — O09.90 SUPERVISION OF HIGH RISK PREGNANCY, ANTEPARTUM: Primary | ICD-10-CM

## 2023-10-23 DIAGNOSIS — Z87.59 HISTORY OF IUFD: ICD-10-CM

## 2023-10-23 DIAGNOSIS — O09.293 HISTORY OF PRE-ECLAMPSIA IN PRIOR PREGNANCY, CURRENTLY PREGNANT IN THIRD TRIMESTER: ICD-10-CM

## 2023-10-23 PROCEDURE — 99207 PR PRENATAL VISIT: CPT | Performed by: FAMILY MEDICINE

## 2023-10-23 NOTE — PROGRESS NOTES
CC: Here for routine prenatal visit   33 year old y/o  @ 38w4d with Estimated Date of Delivery: 2023     /80   Wt 85.7 kg (189 lb)   LMP 2023 (Exact Date)   BMI 32.44 kg/m    See OB flowsheet  + fetal movement, no contractions, no bleeding, no loss of fluid   Discussed monitoring fetal movement     1) concerns:   2) Routine: Blood type A+ RI tdap [ ] flu [x] GS [dec]   Covid v [x] GTT [ ]   3) Risk factors:   A: History of pre-eclampsia with IUFD @ 28 weeks:  Taking baby aspirin, baseline labs  normal              MFM consult  with normal level II us, serial growth recommended,   Previously they recommended ob visits q 2 weeks, growth 4 weeks, and weekly bpp 28 weeks               And we will continue this this pgy.               Neg APA testing              Discussed IOL planning:  she would like IOL  @ 39 weeks, unless medical indication  B: Anemia: slo-fe taking               C: PCOS: clomid for conception of pregnancies              D:  IUGR, now AGA : @ 14th% serial growth in 3 weeks      4) Return: for IOL Friday     Janay

## 2023-10-23 NOTE — PATIENT INSTRUCTIONS
"Return for IOL Friday!   Return to clinic:  every 4 weeks till 28 weeks, then every 2 weeks till 36 weeks, then weekly till delivery      Phone numbers [place]:  Day/ night [phone] ask for ob triage  Emergency:  Call labor and delivery:  [phone]    What should I call about??    Contraction every 5 minutes for 1 hour 1 minute long (511), bleeding, loss of fluid, headache that doesn't resolve with tylenol, and decreased fetal movement     Start kick counts @ 26-28 weeks   There is an hamzah for this!  It is called \"count the kicks\"  Keep track of movement and discover your normal baby movement pattern   guideline is listed below  Please call if you do not feel the baby move!  We will have you come in for fetal heart rate monitoring:   Perception of at least 10 FMs during 12 hours of normal maternal activity   Perception of least 10 FMs over two hours when the mother is at rest and focused on [facility] Address   [address], [place], [place] [address] [phone]    Dr. Mercedez Gustafson, DO    OB/GYN   [facility]                                                    "

## 2023-10-27 ENCOUNTER — HOSPITAL ENCOUNTER (INPATIENT)
Facility: CLINIC | Age: 33
LOS: 2 days | Discharge: HOME HEALTH | End: 2023-10-29
Attending: OBSTETRICS & GYNECOLOGY | Admitting: OBSTETRICS & GYNECOLOGY
Payer: COMMERCIAL

## 2023-10-27 LAB
ABO/RH(D): NORMAL
ANTIBODY SCREEN: NEGATIVE
BLD PROD TYP BPU: NORMAL
BLD PROD TYP BPU: NORMAL
BLOOD COMPONENT TYPE: NORMAL
BLOOD COMPONENT TYPE: NORMAL
CODING SYSTEM: NORMAL
CODING SYSTEM: NORMAL
CROSSMATCH: NORMAL
CROSSMATCH: NORMAL
ERYTHROCYTE [DISTWIDTH] IN BLOOD BY AUTOMATED COUNT: 14.1 % (ref 10–15)
HCT VFR BLD AUTO: 32.7 % (ref 35–47)
HGB BLD-MCNC: 11.1 G/DL (ref 11.7–15.7)
MCH RBC QN AUTO: 31.6 PG (ref 26.5–33)
MCHC RBC AUTO-ENTMCNC: 33.9 G/DL (ref 31.5–36.5)
MCV RBC AUTO: 93 FL (ref 78–100)
PLATELET # BLD AUTO: 194 10E3/UL (ref 150–450)
RBC # BLD AUTO: 3.51 10E6/UL (ref 3.8–5.2)
SPECIMEN EXPIRATION DATE: NORMAL
T PALLIDUM AB SER QL: NONREACTIVE
UNIT ABO/RH: NORMAL
UNIT ABO/RH: NORMAL
UNIT NUMBER: NORMAL
UNIT NUMBER: NORMAL
UNIT STATUS: NORMAL
UNIT STATUS: NORMAL
UNIT TYPE ISBT: 6200
UNIT TYPE ISBT: 6200
WBC # BLD AUTO: 8.4 10E3/UL (ref 4–11)

## 2023-10-27 PROCEDURE — 722N000001 HC LABOR CARE VAGINAL DELIVERY SINGLE

## 2023-10-27 PROCEDURE — 86780 TREPONEMA PALLIDUM: CPT | Performed by: OBSTETRICS & GYNECOLOGY

## 2023-10-27 PROCEDURE — 86922 COMPATIBILITY TEST ANTIGLOB: CPT | Performed by: OBSTETRICS & GYNECOLOGY

## 2023-10-27 PROCEDURE — 120N000001 HC R&B MED SURG/OB

## 2023-10-27 PROCEDURE — 85027 COMPLETE CBC AUTOMATED: CPT | Performed by: OBSTETRICS & GYNECOLOGY

## 2023-10-27 PROCEDURE — 250N000013 HC RX MED GY IP 250 OP 250 PS 637: Performed by: OBSTETRICS & GYNECOLOGY

## 2023-10-27 PROCEDURE — 59400 OBSTETRICAL CARE: CPT | Performed by: OBSTETRICS & GYNECOLOGY

## 2023-10-27 PROCEDURE — 86901 BLOOD TYPING SEROLOGIC RH(D): CPT | Performed by: OBSTETRICS & GYNECOLOGY

## 2023-10-27 PROCEDURE — 250N000009 HC RX 250: Performed by: OBSTETRICS & GYNECOLOGY

## 2023-10-27 PROCEDURE — 258N000003 HC RX IP 258 OP 636: Performed by: OBSTETRICS & GYNECOLOGY

## 2023-10-27 PROCEDURE — 0KQM0ZZ REPAIR PERINEUM MUSCLE, OPEN APPROACH: ICD-10-PCS | Performed by: OBSTETRICS & GYNECOLOGY

## 2023-10-27 PROCEDURE — 10907ZC DRAINAGE OF AMNIOTIC FLUID, THERAPEUTIC FROM PRODUCTS OF CONCEPTION, VIA NATURAL OR ARTIFICIAL OPENING: ICD-10-PCS | Performed by: OBSTETRICS & GYNECOLOGY

## 2023-10-27 PROCEDURE — 86850 RBC ANTIBODY SCREEN: CPT | Performed by: OBSTETRICS & GYNECOLOGY

## 2023-10-27 RX ORDER — HYDROCORTISONE 25 MG/G
CREAM TOPICAL 3 TIMES DAILY PRN
Status: DISCONTINUED | OUTPATIENT
Start: 2023-10-27 | End: 2023-10-29 | Stop reason: HOSPADM

## 2023-10-27 RX ORDER — MODIFIED LANOLIN
OINTMENT (GRAM) TOPICAL
Status: DISCONTINUED | OUTPATIENT
Start: 2023-10-27 | End: 2023-10-29 | Stop reason: HOSPADM

## 2023-10-27 RX ORDER — OXYTOCIN 10 [USP'U]/ML
10 INJECTION, SOLUTION INTRAMUSCULAR; INTRAVENOUS
Status: DISCONTINUED | OUTPATIENT
Start: 2023-10-27 | End: 2023-10-29 | Stop reason: HOSPADM

## 2023-10-27 RX ORDER — MISOPROSTOL 200 UG/1
800 TABLET ORAL
Status: DISCONTINUED | OUTPATIENT
Start: 2023-10-27 | End: 2023-10-27 | Stop reason: HOSPADM

## 2023-10-27 RX ORDER — METHYLERGONOVINE MALEATE 0.2 MG/ML
200 INJECTION INTRAVENOUS
Status: DISCONTINUED | OUTPATIENT
Start: 2023-10-27 | End: 2023-10-29 | Stop reason: HOSPADM

## 2023-10-27 RX ORDER — SODIUM CHLORIDE, SODIUM LACTATE, POTASSIUM CHLORIDE, CALCIUM CHLORIDE 600; 310; 30; 20 MG/100ML; MG/100ML; MG/100ML; MG/100ML
INJECTION, SOLUTION INTRAVENOUS CONTINUOUS PRN
Status: DISCONTINUED | OUTPATIENT
Start: 2023-10-27 | End: 2023-10-27 | Stop reason: HOSPADM

## 2023-10-27 RX ORDER — OXYTOCIN/0.9 % SODIUM CHLORIDE 30/500 ML
340 PLASTIC BAG, INJECTION (ML) INTRAVENOUS CONTINUOUS PRN
Status: DISCONTINUED | OUTPATIENT
Start: 2023-10-27 | End: 2023-10-29 | Stop reason: HOSPADM

## 2023-10-27 RX ORDER — DOCUSATE SODIUM 100 MG/1
100 CAPSULE, LIQUID FILLED ORAL DAILY
Status: DISCONTINUED | OUTPATIENT
Start: 2023-10-27 | End: 2023-10-29 | Stop reason: HOSPADM

## 2023-10-27 RX ORDER — ACETAMINOPHEN 325 MG/1
650 TABLET ORAL EVERY 4 HOURS PRN
Status: DISCONTINUED | OUTPATIENT
Start: 2023-10-27 | End: 2023-10-29 | Stop reason: HOSPADM

## 2023-10-27 RX ORDER — METOCLOPRAMIDE 10 MG/1
10 TABLET ORAL EVERY 6 HOURS PRN
Status: DISCONTINUED | OUTPATIENT
Start: 2023-10-27 | End: 2023-10-27 | Stop reason: HOSPADM

## 2023-10-27 RX ORDER — MISOPROSTOL 200 UG/1
400 TABLET ORAL
Status: DISCONTINUED | OUTPATIENT
Start: 2023-10-27 | End: 2023-10-27 | Stop reason: HOSPADM

## 2023-10-27 RX ORDER — IBUPROFEN 800 MG/1
800 TABLET, FILM COATED ORAL
Status: DISCONTINUED | OUTPATIENT
Start: 2023-10-27 | End: 2023-10-27

## 2023-10-27 RX ORDER — METOCLOPRAMIDE HYDROCHLORIDE 5 MG/ML
10 INJECTION INTRAMUSCULAR; INTRAVENOUS EVERY 6 HOURS PRN
Status: DISCONTINUED | OUTPATIENT
Start: 2023-10-27 | End: 2023-10-27 | Stop reason: HOSPADM

## 2023-10-27 RX ORDER — FENTANYL CITRATE 50 UG/ML
100 INJECTION, SOLUTION INTRAMUSCULAR; INTRAVENOUS
Status: DISCONTINUED | OUTPATIENT
Start: 2023-10-27 | End: 2023-10-27 | Stop reason: HOSPADM

## 2023-10-27 RX ORDER — TRANEXAMIC ACID 10 MG/ML
1 INJECTION, SOLUTION INTRAVENOUS EVERY 30 MIN PRN
Status: DISCONTINUED | OUTPATIENT
Start: 2023-10-27 | End: 2023-10-27 | Stop reason: HOSPADM

## 2023-10-27 RX ORDER — ONDANSETRON 4 MG/1
4 TABLET, ORALLY DISINTEGRATING ORAL EVERY 6 HOURS PRN
Status: DISCONTINUED | OUTPATIENT
Start: 2023-10-27 | End: 2023-10-27 | Stop reason: HOSPADM

## 2023-10-27 RX ORDER — METHYLERGONOVINE MALEATE 0.2 MG/ML
200 INJECTION INTRAVENOUS
Status: DISCONTINUED | OUTPATIENT
Start: 2023-10-27 | End: 2023-10-27 | Stop reason: HOSPADM

## 2023-10-27 RX ORDER — NALOXONE HYDROCHLORIDE 0.4 MG/ML
0.2 INJECTION, SOLUTION INTRAMUSCULAR; INTRAVENOUS; SUBCUTANEOUS
Status: DISCONTINUED | OUTPATIENT
Start: 2023-10-27 | End: 2023-10-27 | Stop reason: HOSPADM

## 2023-10-27 RX ORDER — OXYTOCIN 10 [USP'U]/ML
10 INJECTION, SOLUTION INTRAMUSCULAR; INTRAVENOUS
Status: DISCONTINUED | OUTPATIENT
Start: 2023-10-27 | End: 2023-10-27

## 2023-10-27 RX ORDER — CARBOPROST TROMETHAMINE 250 UG/ML
250 INJECTION, SOLUTION INTRAMUSCULAR
Status: DISCONTINUED | OUTPATIENT
Start: 2023-10-27 | End: 2023-10-29 | Stop reason: HOSPADM

## 2023-10-27 RX ORDER — LIDOCAINE 40 MG/G
CREAM TOPICAL
Status: DISCONTINUED | OUTPATIENT
Start: 2023-10-27 | End: 2023-10-27 | Stop reason: HOSPADM

## 2023-10-27 RX ORDER — NALOXONE HYDROCHLORIDE 0.4 MG/ML
0.4 INJECTION, SOLUTION INTRAMUSCULAR; INTRAVENOUS; SUBCUTANEOUS
Status: DISCONTINUED | OUTPATIENT
Start: 2023-10-27 | End: 2023-10-27 | Stop reason: HOSPADM

## 2023-10-27 RX ORDER — IBUPROFEN 800 MG/1
800 TABLET, FILM COATED ORAL EVERY 6 HOURS PRN
Status: DISCONTINUED | OUTPATIENT
Start: 2023-10-27 | End: 2023-10-29 | Stop reason: HOSPADM

## 2023-10-27 RX ORDER — PROCHLORPERAZINE MALEATE 10 MG
10 TABLET ORAL EVERY 6 HOURS PRN
Status: DISCONTINUED | OUTPATIENT
Start: 2023-10-27 | End: 2023-10-27 | Stop reason: HOSPADM

## 2023-10-27 RX ORDER — BISACODYL 10 MG
10 SUPPOSITORY, RECTAL RECTAL DAILY PRN
Status: DISCONTINUED | OUTPATIENT
Start: 2023-10-27 | End: 2023-10-29 | Stop reason: HOSPADM

## 2023-10-27 RX ORDER — TRANEXAMIC ACID 10 MG/ML
1 INJECTION, SOLUTION INTRAVENOUS EVERY 30 MIN PRN
Status: DISCONTINUED | OUTPATIENT
Start: 2023-10-27 | End: 2023-10-29 | Stop reason: HOSPADM

## 2023-10-27 RX ORDER — SODIUM CHLORIDE, SODIUM LACTATE, POTASSIUM CHLORIDE, CALCIUM CHLORIDE 600; 310; 30; 20 MG/100ML; MG/100ML; MG/100ML; MG/100ML
INJECTION, SOLUTION INTRAVENOUS CONTINUOUS
Status: DISCONTINUED | OUTPATIENT
Start: 2023-10-27 | End: 2023-10-27 | Stop reason: HOSPADM

## 2023-10-27 RX ORDER — DOCUSATE SODIUM 100 MG/1
100 CAPSULE, LIQUID FILLED ORAL ONCE
Status: COMPLETED | OUTPATIENT
Start: 2023-10-27 | End: 2023-10-27

## 2023-10-27 RX ORDER — OXYTOCIN/0.9 % SODIUM CHLORIDE 30/500 ML
100-340 PLASTIC BAG, INJECTION (ML) INTRAVENOUS CONTINUOUS PRN
Status: DISCONTINUED | OUTPATIENT
Start: 2023-10-27 | End: 2023-10-27

## 2023-10-27 RX ORDER — KETOROLAC TROMETHAMINE 30 MG/ML
30 INJECTION, SOLUTION INTRAMUSCULAR; INTRAVENOUS
Status: DISCONTINUED | OUTPATIENT
Start: 2023-10-27 | End: 2023-10-27

## 2023-10-27 RX ORDER — MISOPROSTOL 200 UG/1
400 TABLET ORAL
Status: DISCONTINUED | OUTPATIENT
Start: 2023-10-27 | End: 2023-10-29 | Stop reason: HOSPADM

## 2023-10-27 RX ORDER — MISOPROSTOL 200 UG/1
800 TABLET ORAL
Status: DISCONTINUED | OUTPATIENT
Start: 2023-10-27 | End: 2023-10-29 | Stop reason: HOSPADM

## 2023-10-27 RX ORDER — CARBOPROST TROMETHAMINE 250 UG/ML
250 INJECTION, SOLUTION INTRAMUSCULAR
Status: DISCONTINUED | OUTPATIENT
Start: 2023-10-27 | End: 2023-10-27 | Stop reason: HOSPADM

## 2023-10-27 RX ORDER — ONDANSETRON 2 MG/ML
4 INJECTION INTRAMUSCULAR; INTRAVENOUS EVERY 6 HOURS PRN
Status: DISCONTINUED | OUTPATIENT
Start: 2023-10-27 | End: 2023-10-27 | Stop reason: HOSPADM

## 2023-10-27 RX ORDER — OXYTOCIN/0.9 % SODIUM CHLORIDE 30/500 ML
1-24 PLASTIC BAG, INJECTION (ML) INTRAVENOUS CONTINUOUS
Status: DISCONTINUED | OUTPATIENT
Start: 2023-10-27 | End: 2023-10-27 | Stop reason: HOSPADM

## 2023-10-27 RX ORDER — CITRIC ACID/SODIUM CITRATE 334-500MG
30 SOLUTION, ORAL ORAL
Status: DISCONTINUED | OUTPATIENT
Start: 2023-10-27 | End: 2023-10-27 | Stop reason: HOSPADM

## 2023-10-27 RX ORDER — OXYTOCIN/0.9 % SODIUM CHLORIDE 30/500 ML
340 PLASTIC BAG, INJECTION (ML) INTRAVENOUS CONTINUOUS PRN
Status: DISCONTINUED | OUTPATIENT
Start: 2023-10-27 | End: 2023-10-27 | Stop reason: HOSPADM

## 2023-10-27 RX ORDER — OXYTOCIN 10 [USP'U]/ML
10 INJECTION, SOLUTION INTRAMUSCULAR; INTRAVENOUS
Status: DISCONTINUED | OUTPATIENT
Start: 2023-10-27 | End: 2023-10-27 | Stop reason: HOSPADM

## 2023-10-27 RX ORDER — PROCHLORPERAZINE 25 MG
25 SUPPOSITORY, RECTAL RECTAL EVERY 12 HOURS PRN
Status: DISCONTINUED | OUTPATIENT
Start: 2023-10-27 | End: 2023-10-27 | Stop reason: HOSPADM

## 2023-10-27 RX ADMIN — SODIUM CHLORIDE, POTASSIUM CHLORIDE, SODIUM LACTATE AND CALCIUM CHLORIDE 125 ML/HR: 600; 310; 30; 20 INJECTION, SOLUTION INTRAVENOUS at 13:19

## 2023-10-27 RX ADMIN — IBUPROFEN 800 MG: 800 TABLET, FILM COATED ORAL at 21:16

## 2023-10-27 RX ADMIN — OXYTOCIN-SODIUM CHLORIDE 0.9% IV SOLN 30 UNIT/500ML 2 MILLI-UNITS/MIN: 30-0.9/5 SOLUTION at 08:30

## 2023-10-27 RX ADMIN — LIDOCAINE HYDROCHLORIDE 10 ML: 10 INJECTION, SOLUTION EPIDURAL; INFILTRATION; INTRACAUDAL; PERINEURAL at 19:26

## 2023-10-27 RX ADMIN — SODIUM CHLORIDE, POTASSIUM CHLORIDE, SODIUM LACTATE AND CALCIUM CHLORIDE 125 ML/HR: 600; 310; 30; 20 INJECTION, SOLUTION INTRAVENOUS at 08:30

## 2023-10-27 ASSESSMENT — ACTIVITIES OF DAILY LIVING (ADL)
ADLS_ACUITY_SCORE: 22

## 2023-10-27 NOTE — PROGRESS NOTES
More uncomfortable with contractions, ready for AROM.  Discussed risks, benefits, and alternatives.  On exam, cervix is midposition now, 3/75/-2.  AROM for return of clear fluid.  Cat 1 tracing.    Loren Fuller MD  [facility] Obstetrics and Gynecology

## 2023-10-27 NOTE — PROVIDER NOTIFICATION
10/27/23 0806   Provider Notification   Provider Name/Title saima   Method of Notification At Bedside   Request Evaluate in Person   Notification Reason Status Update;SVE     POC reviewed with pt. Start pitocin, strip reviewed

## 2023-10-27 NOTE — H&P
No significant change in general health status based on examination of the patient, review of Nursing Admission Database and prenatal record.    EFW: 6lb 8oz-7    Here for planned induction of labor for history of IUFD. Was also followed for IUGR earlier in pregnancy, but this resolved.   History severe preeclampsia with IUFD at 28 weeks.   Term delivery after that.    Discussed pitocin, eventual AROM.  Planning to labor without meds preferably.  All questions answered.    Loren Fuller MD  [facility] Obstetrics and Gynecology

## 2023-10-27 NOTE — PROGRESS NOTES
Feeling much stronger contractions now--open to recheck.  Now 6cm/90%/-1  Coping well.  Discussed recheck in 2 hours or sooner as needed.  Category 1 tracing.    Loren Fuller MD  [facility] Obstetrics and Gynecology

## 2023-10-28 PROBLEM — Z36.89 ENCOUNTER FOR TRIAGE IN PREGNANT PATIENT: Status: RESOLVED | Noted: 2023-08-29 | Resolved: 2023-10-28

## 2023-10-28 LAB — HGB BLD-MCNC: 9.3 G/DL (ref 11.7–15.7)

## 2023-10-28 PROCEDURE — 36415 COLL VENOUS BLD VENIPUNCTURE: CPT | Performed by: OBSTETRICS & GYNECOLOGY

## 2023-10-28 PROCEDURE — 250N000013 HC RX MED GY IP 250 OP 250 PS 637: Performed by: OBSTETRICS & GYNECOLOGY

## 2023-10-28 PROCEDURE — 120N000001 HC R&B MED SURG/OB

## 2023-10-28 PROCEDURE — 85018 HEMOGLOBIN: CPT | Performed by: OBSTETRICS & GYNECOLOGY

## 2023-10-28 RX ORDER — IBUPROFEN 600 MG/1
600 TABLET, FILM COATED ORAL EVERY 6 HOURS PRN
Qty: 30 TABLET | Refills: 0 | Status: SHIPPED | OUTPATIENT
Start: 2023-10-28 | End: 2023-12-12

## 2023-10-28 RX ADMIN — BENZOCAINE: 11.4 AEROSOL, SPRAY TOPICAL at 05:27

## 2023-10-28 RX ADMIN — IBUPROFEN 800 MG: 800 TABLET ORAL at 18:39

## 2023-10-28 RX ADMIN — IBUPROFEN 800 MG: 800 TABLET ORAL at 05:26

## 2023-10-28 RX ADMIN — Medication 140 MG: at 10:21

## 2023-10-28 RX ADMIN — IBUPROFEN 800 MG: 800 TABLET ORAL at 12:23

## 2023-10-28 RX ADMIN — DOCUSATE SODIUM 100 MG: 100 CAPSULE, LIQUID FILLED ORAL at 00:09

## 2023-10-28 ASSESSMENT — ACTIVITIES OF DAILY LIVING (ADL)
ADLS_ACUITY_SCORE: 18
DIFFICULTY_EATING/SWALLOWING: NO
ADLS_ACUITY_SCORE: 22
ADLS_ACUITY_SCORE: 18
ADLS_ACUITY_SCORE: 18
WEAR_GLASSES_OR_BLIND: NO
ADLS_ACUITY_SCORE: 22
ADLS_ACUITY_SCORE: 18

## 2023-10-28 NOTE — PLAN OF CARE
VSS. Up ad coral. Voiding without difficulty. Lochia scant, no clots. Fundus firm and midline. Pain managed with ibuprofen.  Breast feeding, tolerating well. FOB supportive and at bedside. Bonding well with infant.

## 2023-10-28 NOTE — DISCHARGE SUMMARY
"[facility] OB Postpartum/Discharge Note    S:  Patient without complaints.  Minimal lochia.  Wants to go home if baby okay for discharge.    O:  Blood pressure 128/87, temperature 97.3  F (36.3  C), temperature source Oral, resp. rate 16, height 1.626 m (5' 4\"), weight 89.8 kg (198 lb), last menstrual period 01/26/2023, unknown if currently breastfeeding.        Urine output adequate        Abdomen - Fundus firm, at umbilicus, nontender        Extremities - No calf tenderness    Hemoglobin   Date Value Ref Range Status   10/28/2023 9.3 (L) 11.7 - 15.7 g/dL Final   02/27/2019 11.1 (L) 11.7 - 15.7 g/dL Final   ]      A:   Postpartum Day# 1, s/p Vaginal delivery - doing well         Postpartum Acute Blood loss anemia - mild, no sx's, due to typical intrapartum blood loss.  No specific Rx needed other than po Fe on discharge.    P:  1)  Discharge home if baby okay for discharge. If baby has to stay, cancel discharge.        2)  F/U 6 weeks w/ Primary OB        3)  Discharge meds: Motrin, Fe sulfate 325 mg every day x 7 days, in addition to PNVs w/ Fe.    Eder Elise MD    Addendum by Dr Tate          "

## 2023-10-28 NOTE — PLAN OF CARE
Data: Elidia Barnes transferred to post partum rm 425 via wheelchair at 2200. Baby transferred via parent's arms.    Action: Receiving unit notified of transfer: Yes. Patient and family notified of room change. Report given to TATYANA Adams,a at 2205. Belongings sent to receiving unit. Accompanied by Registered Nurse. Oriented patient to surroundings. Call light within reach. ID bands double-checked with receiving RN.    Response: Patient tolerated transfer and is stable.  Susie Cornejo RN on 10/27/2023 at 10:17 PM

## 2023-10-28 NOTE — L&D DELIVERY NOTE
VAGINAL DELIVERY PROCEDURE NOTE    PREOPERATIVE DIAGNOSIS:  1. Intrauterine pregnancy at 39w1d  2. Induction of labor  3. History of IUFD    POSTOPERATIVE DIAGNOSIS:   1. Status post   2. Second degree laceration    PROCEDURE DATE: 2023    PROCEDURE:   1.   2. Laceration repair      STAFF SURGEON: Loren Fuller MD    ANESTHESIA: local    COMPLICATIONS: none    QBL: pending     SPECIMENS: cord blood    FINDINGS:  female infant, Apgar scores pending.  Delivered in GM presentation. Placenta with 3 vessel cord. Meconium: none.      INDICATIONS:  This is a 33 year old  with intrauterine pregnancy at 39w1d who presented to Labor and Delivery for induction of labor for history of IUFD. Her pregnancy has been complicated by IUGR which resolved.     PROCEDURE:  The patient was complete and pushing. Infant's head was delivered atraumatically over perineum in the GM position. Nuchal cord: times one, reduced. Anterior shoulder and posterior shoulders were easily delivered without problems followed by remainder of infant. Infant vigorous and crying. Drying and resuscitative measures performed. Delayed cord clamping was performed prior to cutting. Cord gases were not obtained. Cord blood was collected. Pitocin in IV fluid was administered per protocol. The placenta delivered spontaneously intact with 3 vessel cord and revealed normal anatomy. Uterine massage was performed and the fundus was found to be firm. Vagina, cervix, and perineum were inspected for lacerations. Second degree laceration was noted and repaired with 3.0 vicryl rapide. All counts were correct. Mom and baby stable in room.    Primary OB: Dr. Janay Fuller MD  7:52 PM  2023

## 2023-10-28 NOTE — PLAN OF CARE
VSS. Fundus needing massage to firm up during recovery and X 1 this shift, however, bleeding scant. Pain managed with ibuprofen. Ambulating, voiding without difficulty and tolerating regular diet. Breastfeeding infant with some assistance. Positive bonding behaviors observed. Niece at bedside and supportive. Partner to return in AM.

## 2023-10-29 VITALS
WEIGHT: 186.29 LBS | DIASTOLIC BLOOD PRESSURE: 83 MMHG | HEIGHT: 64 IN | HEART RATE: 94 BPM | BODY MASS INDEX: 31.8 KG/M2 | RESPIRATION RATE: 16 BRPM | TEMPERATURE: 98.1 F | SYSTOLIC BLOOD PRESSURE: 132 MMHG

## 2023-10-29 PROCEDURE — 250N000013 HC RX MED GY IP 250 OP 250 PS 637: Performed by: OBSTETRICS & GYNECOLOGY

## 2023-10-29 RX ADMIN — IBUPROFEN 800 MG: 800 TABLET ORAL at 08:45

## 2023-10-29 RX ADMIN — Medication 140 MG: at 08:45

## 2023-10-29 RX ADMIN — DOCUSATE SODIUM 100 MG: 100 CAPSULE, LIQUID FILLED ORAL at 01:05

## 2023-10-29 ASSESSMENT — ACTIVITIES OF DAILY LIVING (ADL)
ADLS_ACUITY_SCORE: 18

## 2023-10-29 NOTE — PROGRESS NOTES
[facility] Obstetrics Post-Partum Progress Note          Assessment and Plan:    Assessment:   Post-partum day #2  Normal spontaneous vaginal delivery  L&D complications: None      Doing well.  No immediate surgical complications identified.  Status unchanged from yesterday's discharge plan      Plan:   Discharge later today             Interval History:             Significant Problems:    No new issues          Review of Systems:              Medications:             Physical Exam:               Data:         Pranay Tate MD

## 2023-10-29 NOTE — DISCHARGE INSTRUCTIONS
Warning Signs after Having a Baby    Keep this paper on your fridge or somewhere else where you can see it.    Call your provider if you have any of these symptoms up to 12 weeks after having your baby.    Thoughts of hurting yourself or your baby  Pain in your chest or trouble breathing  Severe headache not helped by pain medicine  Eyesight concerns (blurry vision, seeing spots or flashes of light, other changes to eyesight)  Fainting, shaking or other signs of a seizure    Call 9-1-1 if you feel that it is an emergency.     The symptoms below can happen to anyone after giving birth. They can be very serious. Call your provider if you have any of these warning signs.    My provider s phone number: _______________________    Losing too much blood (hemorrhage)    Call your provider if you soak through a pad in less than an hour or pass blood clots bigger than a golf ball. These may be signs that you are bleeding too much.    Blood clots in the legs or lungs    After you give birth, your body naturally clots its blood to help prevent blood loss. Sometimes this increased clotting can happen in other areas of the body, like the legs or lungs. This can block your blood flow and be very dangerous.     Call your provider if you:  Have a red, swollen spot on the back of your leg that is warm or painful when you touch it.   Are coughing up blood.     Infection    Call your provider if you have any of these symptoms:  Fever of 100.4 F (38 C) or higher.  Pain or redness around your stitches if you had an incision.   Any yellow, white, or green fluid coming from places where you had stitches or surgery.    Mood Problems (postpartum depression)    Many people feel sad or have mood changes after having a baby. But for some people, these mood swings are worse.     Call your provider right away if you feel so anxious or nervous that you can't care for yourself or your baby.    Preeclampsia (high blood pressure)    Even if you  didn't have high blood pressure when you were pregnant, you are at risk for the high blood pressure disease called preeclampsia. This risk can last up to 12 weeks after giving birth.     Call your provider if you have:   Pain on your right side under your rib cage  Sudden swelling in the hands and face    Remember: You know your body. If something doesn't feel right, get medical help.     For informational purposes only. Not to replace the advice of your health care provider. Copyright 2020 [facility]. All rights reserved. Clinically reviewed by Dary Mackenzie, RNC-OB, MSN. Glisten [id number] - Rev 02/23.

## 2023-10-29 NOTE — PLAN OF CARE
Discharge instructions completed.  Patient states she understands all discharge instructions and all of her questions have been answered.  Patient verbalizes when she needs to return to clinic for follow up for herself and baby.  She is caring for herself and her baby independently.  Prescriptions filled and given to patient/family.  Postpartum depression symptoms reviewed and encouraged frequent review of depression scale. Breast pump order filled and given to mother.  Patient discharged with spouse and baby at 1300.

## 2023-10-29 NOTE — PLAN OF CARE
VSS. Pain managed with ibuprofen. Ambulating, voiding without difficulty and tolerating regular diet. Breastfeeding independently. Caring for self and infant without assistance. Positive bonding behaviors observed. Planning to complete paperwork in AM.

## 2023-12-12 ENCOUNTER — PRENATAL OFFICE VISIT (OUTPATIENT)
Dept: OBGYN | Facility: CLINIC | Age: 33
End: 2023-12-12
Payer: COMMERCIAL

## 2023-12-12 VITALS — WEIGHT: 177 LBS | SYSTOLIC BLOOD PRESSURE: 142 MMHG | DIASTOLIC BLOOD PRESSURE: 90 MMHG | BODY MASS INDEX: 30.38 KG/M2

## 2023-12-12 PROCEDURE — 99207 PR POST PARTUM EXAM: CPT | Performed by: OBSTETRICS & GYNECOLOGY

## 2023-12-12 NOTE — PROGRESS NOTES
Postpartum Visit    Elidia Barnes is a 33 year old here for a postpartum visit.     Delivery date was 10/27/23. She had a  of a viable girl, weight 6 pounds 10.2 oz., with no complications      Since delivery, she has been breast feeding.  She has not had any signs of infection. Her lochia is now light.  She has not had other complications.      She is voiding and having bowel movements without difficulty.       Contraception was discussed and patient desires none.   She  has not had intercourse since delivery.   She complains of No  perineal discomfort.     Mood is Stable  Patient screened for postpartum depression.   Depression Rating was:   Last PHQ-9 score on record =       2019     9:44 AM   PHQ-9 SCORE   PHQ-9 Total Score 1     Last GAD7 score on record =        No data to display                  Current Outpatient Medications:     Omega-3 Fish Oil 500 MG capsule, Take 1 capsule (500 mg) by mouth daily, Disp: 30 capsule, Rfl: 11    Prenatal MV & Min w/FA-DHA (PRENATAL GUMMIES) 0.18-25 MG CHEW, Take 1 tablet by mouth daily Vegan only, Disp: 90 tablet, Rfl: 3.   OB History    Para Term  AB Living   3 3 2 1 0 2   SAB IAB Ectopic Multiple Live Births   0 0 0 0 2      # Outcome Date GA Lbr Shayne/2nd Weight Sex Delivery Anes PTL Lv   3 Term 10/27/23 39w1d 01:25 / 00:04 3.01 kg (6 lb 10.2 oz) F Vag-Spont None N SANNA      Name: Darcy Jimenez      Apgar1: 8  Apgar5: 9   2 Term 19 38w5d 05:02 / 00:11 3.25 kg (7 lb 2.6 oz) F Vag-Spont Local N SANNA      Complications: GBS      Name: GENOVEVA,FEMALE-ELIDIA      Apgar1: 8  Apgar5: 9   1   28w0d   M SAB None  FD      Complications: Preeclampsia/Hypertension       EXAM:  BP (!) 142/90   Wt 80.3 kg (177 lb)   LMP 2023 (Exact Date)   Breastfeeding Yes   BMI 30.38 kg/m    BMI: Body mass index is 30.38 kg/m .  Exam:  Constitutional: healthy, alert and no distress  Psychiatric: mentation appears normal and affect  normal/bright  Pelvis: External genitalia, Bartholin, urethral, and [employer] glands well healed. Slight bleeding.    ASSESSMENT:   Normal postpartum exam after .    ICD-10-CM    1. Routine postpartum follow-up  Z39.2             PLAN:  No results found for any visits on 23.    Teaching: birth control and mental health  Family Planning:none  Encourage Kegels and abdominal exercise.  Continue a multivitamin/prenatal supplement, especially if breastfeeding.  Postpartum Hgb was not done today.      Loren Fuller MD  [facility] Obstetrics and Gynecology

## 2024-09-22 ENCOUNTER — HEALTH MAINTENANCE LETTER (OUTPATIENT)
Age: 34
End: 2024-09-22

## 2025-02-26 NOTE — PROGRESS NOTES
Please see ultrasound report under imaging tab for details on ultrasound performed today.    Kristine Paredes MD  , OB/GYN  Maternal-Fetal Medicine  [email or web address]  [phone] (Academic office)  [phone] (Pager)    
ambulatory

## 2025-05-13 ENCOUNTER — OFFICE VISIT (OUTPATIENT)
Dept: URGENT CARE | Facility: URGENT CARE | Age: 35
End: 2025-05-13
Payer: COMMERCIAL

## 2025-05-13 ENCOUNTER — ANCILLARY PROCEDURE (OUTPATIENT)
Dept: GENERAL RADIOLOGY | Facility: CLINIC | Age: 35
End: 2025-05-13
Attending: PHYSICIAN ASSISTANT
Payer: COMMERCIAL

## 2025-05-13 VITALS
HEART RATE: 74 BPM | SYSTOLIC BLOOD PRESSURE: 128 MMHG | HEIGHT: 64 IN | WEIGHT: 165.3 LBS | TEMPERATURE: 97.4 F | BODY MASS INDEX: 28.22 KG/M2 | DIASTOLIC BLOOD PRESSURE: 85 MMHG | OXYGEN SATURATION: 100 %

## 2025-05-13 DIAGNOSIS — M79.18 GLUTEAL PAIN: ICD-10-CM

## 2025-05-13 DIAGNOSIS — H60.392 INFECTIVE OTITIS EXTERNA, LEFT: ICD-10-CM

## 2025-05-13 DIAGNOSIS — M25.551 HIP PAIN, RIGHT: ICD-10-CM

## 2025-05-13 DIAGNOSIS — M25.551 HIP PAIN, RIGHT: Primary | ICD-10-CM

## 2025-05-13 PROCEDURE — 99214 OFFICE O/P EST MOD 30 MIN: CPT | Performed by: PHYSICIAN ASSISTANT

## 2025-05-13 PROCEDURE — 3074F SYST BP LT 130 MM HG: CPT | Performed by: PHYSICIAN ASSISTANT

## 2025-05-13 PROCEDURE — 73502 X-RAY EXAM HIP UNI 2-3 VIEWS: CPT | Mod: TC | Performed by: RADIOLOGY

## 2025-05-13 PROCEDURE — 3079F DIAST BP 80-89 MM HG: CPT | Performed by: PHYSICIAN ASSISTANT

## 2025-05-13 RX ORDER — NEOMYCIN SULFATE, POLYMYXIN B SULFATE AND HYDROCORTISONE 10; 3.5; 1 MG/ML; MG/ML; [USP'U]/ML
3 SUSPENSION/ DROPS AURICULAR (OTIC) 3 TIMES DAILY
Qty: 10 ML | Refills: 0 | Status: SHIPPED | OUTPATIENT
Start: 2025-05-13 | End: 2025-05-20

## 2025-05-13 RX ORDER — ACETAMINOPHEN 500 MG
500-1000 TABLET ORAL EVERY 6 HOURS PRN
Qty: 30 TABLET | Refills: 0 | Status: SHIPPED | OUTPATIENT
Start: 2025-05-13

## 2025-05-13 RX ORDER — IBUPROFEN 600 MG/1
600 TABLET, FILM COATED ORAL EVERY 6 HOURS PRN
Qty: 30 TABLET | Refills: 0 | Status: SHIPPED | OUTPATIENT
Start: 2025-05-13

## 2025-05-13 NOTE — PROGRESS NOTES
Assessment & Plan     Hip pain, right    Hip xray Negative for acute findings, read by Fan RUBIO at time of visit.    Hip pain may be caused by many things, including overuse, a fall, or a twisting movement. Another cause of hip pain is arthritis. Your pain may increase when you stand up, walk, or squat. The pain may come and go or may be constant.  Home treatment can help relieve hip pain, swelling, and stiffness. If your pain is ongoing, you may need more tests and treatment.  Follow-up care is a key part of your treatment and safety    Trial course of motrin  tylenol  - XR Hip Right 2-3 Views    Gluteal pain    Pain in left gluteal area with tenderness  Ice and warm heat  Motrin and tylenol    Breast feeding status of mother    Will avoid muscle relaxants and naprosyn    Infective otitis externa, left    External otitis (also called  swimmer s ear ) is an infection in the ear canal. It's often caused by bacteria or fungus. It can occur a few days after water gets trapped in the ear canal (from swimming or bathing). It can also occur after cleaning too deeply in the ear canal with a cotton swab or other object. Sometimes, hair care products get into the ear canal and cause this problem.   Symptoms can include pain, fever, itching, redness, drainage, or swelling of the ear canal. Temporary hearing loss may also occur.     Cortisporin otic susp for OE    At today's visit with Elidia Barnes , we discussed results, diagnosis, medications and formulated a plan.  We also discussed red flags for immediate return to clinic/ER, as well as indications for follow up with PCP if not improved in 3 days. Patient understood and agreed to plan. Elidia Barnes was discharged with stable vitals and has no further questions.       No follow-ups on file.    Fan Mendiola, [facility], TYRON ZAIDI [facility] URGENT CARE VICENTE [place]     Elidia is a 35 year old female who presents to clinic today for the  "following health issues:  Chief Complaint   Patient presents with    Urgent Care     Pt presents with pain in the right hip onset yesterday. Also reports pain in the left ear.         5/13/2025    11:01 AM   Additional Questions   Roomed by thompson valero   Accompanied by      HPI  Review of Systems  Constitutional, HEENT, cardiovascular, pulmonary, gi and gu systems are negative, except as otherwise noted.      Objective    /85   Pulse 74   Temp 97.4  F (36.3  C) (Oral)   Ht 1.626 m (5' 4\")   Wt 75 kg (165 lb 4.8 oz)   LMP 05/10/2025 (Approximate)   SpO2 100%   Breastfeeding Yes   BMI 28.37 kg/m    Physical Exam   GENERAL: alert and no distress  EYES: Eyes grossly normal to inspection, PERRL and conjunctivae and sclerae normal  HENT: normal cephalic/atraumatic, right ear: normal: no effusions, no erythema, normal landmarks, left ear: red and boggy canal, nose and mouth without ulcers or lesions, oropharynx clear, and oral mucous membranes moist  NECK: no adenopathy, no asymmetry, masses, or scars  RESP: lungs clear to auscultation - no rales, rhonchi or wheezes  CV: regular rate and rhythm, normal S1 S2, no S3 or S4, no murmur, click or rub, no peripheral edema  MS: pos for left gluteal and hip tenderness.  Full ROM  SKIN: no suspicious lesions or rashes  NEURO: Normal strength and tone, mentation intact and speech normal  PSYCH: mentation appears normal, affect normal/bright      Hip xray Negative for acute findings, read by Fan Mendiola PA-C [facility] at time of visit.        "

## 2025-05-13 NOTE — PROGRESS NOTES
Urgent Care Clinic Visit    Chief Complaint   Patient presents with    Urgent Care     Pt presents with pain in the right hip onset yesterday. Also reports pain in the left ear.               5/13/2025    11:01 AM   Additional Questions   Roomed by thompson valero   Accompanied by

## 2025-06-04 ENCOUNTER — THERAPY VISIT (OUTPATIENT)
Dept: PHYSICAL THERAPY | Facility: CLINIC | Age: 35
End: 2025-06-04
Payer: COMMERCIAL

## 2025-06-04 ENCOUNTER — OFFICE VISIT (OUTPATIENT)
Dept: FAMILY MEDICINE | Facility: CLINIC | Age: 35
End: 2025-06-04
Payer: COMMERCIAL

## 2025-06-04 VITALS
DIASTOLIC BLOOD PRESSURE: 84 MMHG | TEMPERATURE: 97.9 F | RESPIRATION RATE: 19 BRPM | HEART RATE: 69 BPM | SYSTOLIC BLOOD PRESSURE: 127 MMHG | OXYGEN SATURATION: 99 %

## 2025-06-04 DIAGNOSIS — M54.41 ACUTE RIGHT-SIDED LOW BACK PAIN WITH RIGHT-SIDED SCIATICA: Primary | ICD-10-CM

## 2025-06-04 DIAGNOSIS — M54.41 ACUTE RIGHT-SIDED LOW BACK PAIN WITH RIGHT-SIDED SCIATICA: ICD-10-CM

## 2025-06-04 PROCEDURE — 99214 OFFICE O/P EST MOD 30 MIN: CPT | Performed by: PHYSICIAN ASSISTANT

## 2025-06-04 PROCEDURE — G2211 COMPLEX E/M VISIT ADD ON: HCPCS | Performed by: PHYSICIAN ASSISTANT

## 2025-06-04 PROCEDURE — 97530 THERAPEUTIC ACTIVITIES: CPT | Mod: GP | Performed by: PHYSICAL THERAPIST

## 2025-06-04 PROCEDURE — 3079F DIAST BP 80-89 MM HG: CPT | Performed by: PHYSICIAN ASSISTANT

## 2025-06-04 PROCEDURE — 3074F SYST BP LT 130 MM HG: CPT | Performed by: PHYSICIAN ASSISTANT

## 2025-06-04 PROCEDURE — 97110 THERAPEUTIC EXERCISES: CPT | Mod: GP | Performed by: PHYSICAL THERAPIST

## 2025-06-04 PROCEDURE — 97161 PT EVAL LOW COMPLEX 20 MIN: CPT | Mod: GP | Performed by: PHYSICAL THERAPIST

## 2025-06-04 RX ORDER — METHOCARBAMOL 500 MG/1
500 TABLET, FILM COATED ORAL 4 TIMES DAILY PRN
Qty: 40 TABLET | Refills: 1 | Status: SHIPPED | OUTPATIENT
Start: 2025-06-04

## 2025-06-04 ASSESSMENT — ANXIETY QUESTIONNAIRES
IF YOU CHECKED OFF ANY PROBLEMS ON THIS QUESTIONNAIRE, HOW DIFFICULT HAVE THESE PROBLEMS MADE IT FOR YOU TO DO YOUR WORK, TAKE CARE OF THINGS AT HOME, OR GET ALONG WITH OTHER PEOPLE: NOT DIFFICULT AT ALL
5. BEING SO RESTLESS THAT IT IS HARD TO SIT STILL: NOT AT ALL
7. FEELING AFRAID AS IF SOMETHING AWFUL MIGHT HAPPEN: NOT AT ALL
GAD7 TOTAL SCORE: 0
1. FEELING NERVOUS, ANXIOUS, OR ON EDGE: NOT AT ALL
3. WORRYING TOO MUCH ABOUT DIFFERENT THINGS: NOT AT ALL
6. BECOMING EASILY ANNOYED OR IRRITABLE: NOT AT ALL
GAD7 TOTAL SCORE: 0
2. NOT BEING ABLE TO STOP OR CONTROL WORRYING: NOT AT ALL

## 2025-06-04 ASSESSMENT — PATIENT HEALTH QUESTIONNAIRE - PHQ9
SUM OF ALL RESPONSES TO PHQ QUESTIONS 1-9: 0
5. POOR APPETITE OR OVEREATING: NOT AT ALL

## 2025-06-04 NOTE — PROGRESS NOTES
PHYSICAL THERAPY EVALUATION  Type of Visit: Evaluation        Fall Risk Screen:  Have you fallen 2 or more times in the past year?: No  Have you fallen and had an injury in the past year?: No    Subjective     Pt's back started bothering her 5/12, since then has been getting worse. Denies any injury. Describes pain as R low back, sometimes radiating into R gluteals. Denies numbness or tingling. Pt reports pain worse in walking, rolling in bed, bending over, unable to lift her 1YO daughter. Pain interrupting her sleep, unable to walk up stairs normally and needing to crawl. Pt reports pain best in sitting although still present.   Sitting for work, often working at home (2-3 hours only), typically no overt exercise, just active with her kids (6, 2).          Presenting condition or subjective complaint: having lower back pain  Date of onset: 06/04/25 (MD order)    Relevant medical history: Pain at night or rest   Dates & types of surgery: non    Prior diagnostic imaging/testing results: X-ray     Xray R hip 5/13/25: IMPRESSION: Normal joint spaces and alignment. No fracture.     Prior therapy history for the same diagnosis, illness or injury: No      Living Environment  Social support: With family members   Type of home: [facility]   Stairs to enter the home: No       Ramp: No   Stairs inside the home: Yes 2 Is there a railing: No     Help at home: Home management tasks (cooking, cleaning)  Equipment owned:       Employment: Yes   Hobbies/Interests: shopping and play with my  babies    Patient goals for therapy: i can't go my work because of my back pain    Pain assessment: Pain present     Objective   LUMBAR SPINE EVALUATION  PAIN: Pain Level at Rest: 5/10  Pain Level with Use: 10/10  INTEGUMENTARY (edema, incisions): WNL  POSTURE: Standing Posture: Lordosis increased  GAIT:   Weightbearing Status: WBAT, pt presents to clinic in wheelchair because of pain, exits via IND ambulation   Gait Deviations:  With gait pt with tendency to increase lumbar lordosis and hyperextend with shift to R side   BALANCE/PROPRIOCEPTION: L SLS WNL,  unable to complete SLS through RLE   ROM: reduced lumbar flexion ROM in cat cow   STRENGTH: 3+/5 B hip flexion with pain on R side with both, 3-/5 hip abduction R side   MYOTOMES: weakness B hip flexors, symmetrical   DERMATOMES: WNL  NEURAL TENSION: Lumbar WNL  Negative slump, pt's symptoms improve with R slump  FUNCTIONAL TESTS: Double Leg Squat: Anterior knee translation, Hip internal rotation, Improper use of glutes/hips, and pain with increased depth   PALPATION: some TTP to upper glutes       Assessment & Plan   CLINICAL IMPRESSIONS  Medical Diagnosis: acute R sided LBP with R sided scaticia    Treatment Diagnosis: R sided LBP   Impression/Assessment: Patient is a 35 year old female with low back pain complaints.  The following significant findings have been identified: Pain, Decreased ROM/flexibility, Decreased joint mobility, Decreased strength, Impaired balance, Impaired gait, Impaired muscle performance, Decreased activity tolerance, and Impaired posture. These impairments interfere with their ability to perform self care tasks, work tasks, recreational activities, household chores, driving , household mobility, and community mobility as compared to previous level of function.     Clinical Decision Making (Complexity):  Clinical Presentation: Stable/Uncomplicated  Clinical Presentation Rationale: based on medical and personal factors listed in PT evaluation  Clinical Decision Making (Complexity): Low complexity    PLAN OF CARE  Treatment Interventions:  Interventions: Gait Training, Manual Therapy, Neuromuscular Re-education, Therapeutic Activity, Therapeutic Exercise    Long Term Goals     PT Goal 1  Goal Identifier: walking  Goal Description: pt will be able to walk for 10 mins without increase in low back pain  Rationale: to maximize safety and independence with performance  of ADLs and functional tasks  Target Date: 08/27/25  PT Goal 2  Goal Identifier: sleeping  Goal Description: pt will be able to roll over in bed, sleep through the night without limitation of low back pain  Rationale: to maximize safety and independence with performance of ADLs and functional tasks  Target Date: 08/27/25      Frequency of Treatment: once per week  Duration of Treatment: 12 weeks    Recommended Referrals to Other Professionals: NA  Education Assessment:   Learner/Method: Patient  Education Comments: Pt educated on PT role and plan of care    Risks and benefits of evaluation/treatment have been explained.   Patient/Family/caregiver agrees with Plan of Care.     Evaluation Time:     PT Eval, Low Complexity Minutes (80858): 15     Signing Clinician: Gaviota Salmeron PT        [facility]                                                                                   OUTPATIENT PHYSICAL THERAPY      PLAN OF TREATMENT FOR OUTPATIENT REHABILITATION   Patient's Last Name, First Name, Elidia Phelan YOB: 1990   Provider's Name   [facility]   Medical Record No.  [id number]     Onset Date: 06/04/25 (MD order)  Start of Care Date: 06/04/25     Medical Diagnosis:  acute R sided LBP with R sided scaticia      PT Treatment Diagnosis:  R sided LBP Plan of Treatment  Frequency/Duration: once per week/ 12 weeks    Certification date from 06/04/25 to 08/27/25         See note for plan of treatment details and functional goals     Gaviota Salmeron, PT                         I CERTIFY THE NEED FOR THESE SERVICES FURNISHED UNDER        THIS PLAN OF TREATMENT AND WHILE UNDER MY CARE     (Physician attestation of this document indicates review and certification of the therapy plan).              Referring Provider:  Derian Liang    Initial Assessment  See Epic Evaluation- Start of Care Date: 06/04/25

## 2025-06-04 NOTE — PROGRESS NOTES
Assessment & Plan     Acute right-sided low back pain with right-sided sciatica    Trial muscle relaxer. She is no longer breast feeding/producing milk. Recommend starting PT as soon as possible. Follow-up with primary care provider as needed if not improving or if worsening.    - methocarbamol (ROBAXIN) 500 MG tablet; Take 1 tablet (500 mg) by mouth 4 times daily as needed for muscle spasms.  - Physical Therapy  Referral; Future      The longitudinal plan of care for the diagnosis(es)/condition(s) as documented were addressed during this visit. Due to the added complexity in care, I will continue to support Elidia in the subsequent management and with ongoing continuity of care.          Subjective   Elidia is a 35 year old, presenting for the following health issues:  Musculoskeletal Problem    HPI        ED/UC Followup:    Facility:  [facility]   Date of visit: 5/13/2025  Reason for visit: Right hip pain   Current Status: Becoming worse     Pain History:  When did you first notice your pain? 5/12   Have you seen anyone else for your pain? Yes - Urgent care  How has your pain affected your ability to work? Unable to work due to pain   What type of work do you or did you do? - mostly sitting  Where in your body do you have pain? Musculoskeletal problem/pain  Onset/Duration: 5/12  Description  Location: Hip  - right- buttock to low back  Joint Swelling: No  Redness: No  Pain: YES- feels tight and sharp pain when walking  Warmth: No  Intensity:  severe  Progression of Symptoms:  worsening  Accompanying signs and symptoms:   Fevers: No  Numbness/tingling/weakness: No  History  Trauma to the area: No  Recent illness:  No  Previous similar problem: No  Previous evaluation:  YES  Precipitating or alleviating factors:  Aggravating factors include: walking  Therapies tried and outcome: massage, stretching, exercises, acetaminophen, and Ibuprofen, tried chiropractor but not improving;  Tylenol and ibuprofen are helpful temporarily      Review of Systems  Constitutional, HEENT, cardiovascular, pulmonary, gi and gu systems are negative, except as otherwise noted.        Objective    /84 (BP Location: Right arm, Patient Position: Sitting, Cuff Size: Adult Large)   Pulse 69   Temp 97.9  F (36.6  C) (Oral)   Resp 19   LMP 05/20/2025 (Approximate)   SpO2 99%   Breastfeeding Yes   There is no height or weight on file to calculate BMI.      Physical Exam   GENERAL: alert and no distress  EYES: Eyes grossly normal to inspection, PERRL and conjunctivae and sclerae normal  MS: no gross musculoskeletal defects noted, no edema  SKIN: no suspicious lesions or rashes  NEURO: Normal strength and tone, mentation intact and speech normal  Comprehensive back pain exam:  Tenderness of right side SI joint and muscles nearby, Pain limits the following motions: bending, left side flexion, and Straight leg positive on  right, indicating possible ipsilateral radiculopathy (mildly)  PSYCH: mentation appears normal, affect normal/bright    Reviewed x-ray from urgent care.          Signed Electronically by: Derian Liang PA-C

## 2025-06-09 ENCOUNTER — THERAPY VISIT (OUTPATIENT)
Dept: PHYSICAL THERAPY | Facility: CLINIC | Age: 35
End: 2025-06-09
Payer: COMMERCIAL

## 2025-06-09 DIAGNOSIS — M54.41 ACUTE RIGHT-SIDED LOW BACK PAIN WITH RIGHT-SIDED SCIATICA: Primary | ICD-10-CM

## 2025-06-09 PROCEDURE — 97530 THERAPEUTIC ACTIVITIES: CPT | Mod: GP | Performed by: PHYSICAL THERAPIST

## 2025-06-09 PROCEDURE — 97110 THERAPEUTIC EXERCISES: CPT | Mod: GP | Performed by: PHYSICAL THERAPIST

## 2025-06-19 ENCOUNTER — THERAPY VISIT (OUTPATIENT)
Dept: PHYSICAL THERAPY | Facility: CLINIC | Age: 35
End: 2025-06-19
Payer: COMMERCIAL

## 2025-06-19 DIAGNOSIS — M54.41 ACUTE RIGHT-SIDED LOW BACK PAIN WITH RIGHT-SIDED SCIATICA: Primary | ICD-10-CM

## 2025-07-02 ENCOUNTER — THERAPY VISIT (OUTPATIENT)
Dept: PHYSICAL THERAPY | Facility: CLINIC | Age: 35
End: 2025-07-02
Payer: COMMERCIAL

## 2025-07-02 DIAGNOSIS — M54.41 ACUTE RIGHT-SIDED LOW BACK PAIN WITH RIGHT-SIDED SCIATICA: Primary | ICD-10-CM

## 2025-07-02 PROCEDURE — 97110 THERAPEUTIC EXERCISES: CPT | Mod: GP | Performed by: PHYSICAL THERAPIST

## 2025-07-10 ENCOUNTER — THERAPY VISIT (OUTPATIENT)
Dept: PHYSICAL THERAPY | Facility: CLINIC | Age: 35
End: 2025-07-10
Payer: COMMERCIAL

## 2025-07-10 DIAGNOSIS — M54.41 ACUTE RIGHT-SIDED LOW BACK PAIN WITH RIGHT-SIDED SCIATICA: Primary | ICD-10-CM

## 2025-07-23 ENCOUNTER — THERAPY VISIT (OUTPATIENT)
Dept: PHYSICAL THERAPY | Facility: CLINIC | Age: 35
End: 2025-07-23
Payer: COMMERCIAL

## 2025-07-23 DIAGNOSIS — M54.41 ACUTE RIGHT-SIDED LOW BACK PAIN WITH RIGHT-SIDED SCIATICA: Primary | ICD-10-CM

## 2025-07-23 PROCEDURE — 97110 THERAPEUTIC EXERCISES: CPT | Mod: GP | Performed by: PHYSICAL THERAPIST

## 2025-07-30 ENCOUNTER — THERAPY VISIT (OUTPATIENT)
Dept: PHYSICAL THERAPY | Facility: CLINIC | Age: 35
End: 2025-07-30
Payer: COMMERCIAL

## 2025-07-30 DIAGNOSIS — M54.41 ACUTE RIGHT-SIDED LOW BACK PAIN WITH RIGHT-SIDED SCIATICA: Primary | ICD-10-CM

## 2025-07-30 PROCEDURE — 97110 THERAPEUTIC EXERCISES: CPT | Mod: GP | Performed by: PHYSICAL THERAPIST

## 2025-07-30 PROCEDURE — 97530 THERAPEUTIC ACTIVITIES: CPT | Mod: GP | Performed by: PHYSICAL THERAPIST
